# Patient Record
Sex: MALE | Race: WHITE | NOT HISPANIC OR LATINO | Employment: OTHER | URBAN - METROPOLITAN AREA
[De-identification: names, ages, dates, MRNs, and addresses within clinical notes are randomized per-mention and may not be internally consistent; named-entity substitution may affect disease eponyms.]

---

## 2017-01-11 ENCOUNTER — OFFICE VISIT (OUTPATIENT)
Dept: FAMILY MEDICINE | Facility: CLINIC | Age: 72
End: 2017-01-11
Payer: MEDICARE

## 2017-01-11 VITALS
HEIGHT: 74 IN | HEART RATE: 62 BPM | WEIGHT: 232.81 LBS | BODY MASS INDEX: 29.88 KG/M2 | OXYGEN SATURATION: 97 % | RESPIRATION RATE: 16 BRPM | SYSTOLIC BLOOD PRESSURE: 112 MMHG | DIASTOLIC BLOOD PRESSURE: 74 MMHG

## 2017-01-11 DIAGNOSIS — Z00.00 ENCOUNTER FOR PREVENTIVE HEALTH EXAMINATION: ICD-10-CM

## 2017-01-11 DIAGNOSIS — M72.0 DUPUYTREN'S CONTRACTURE OF HAND: Primary | ICD-10-CM

## 2017-01-11 DIAGNOSIS — Z12.11 COLON CANCER SCREENING: ICD-10-CM

## 2017-01-11 DIAGNOSIS — G47.00 INSOMNIA, UNSPECIFIED TYPE: ICD-10-CM

## 2017-01-11 DIAGNOSIS — Z13.6 SCREENING FOR AAA (ABDOMINAL AORTIC ANEURYSM): ICD-10-CM

## 2017-01-11 DIAGNOSIS — Z11.59 NEED FOR HEPATITIS C SCREENING TEST: ICD-10-CM

## 2017-01-11 DIAGNOSIS — Z23 IMMUNIZATION DUE: ICD-10-CM

## 2017-01-11 DIAGNOSIS — G47.33 OSA (OBSTRUCTIVE SLEEP APNEA): ICD-10-CM

## 2017-01-11 PROCEDURE — 90662 IIV NO PRSV INCREASED AG IM: CPT | Mod: S$GLB,,, | Performed by: NURSE PRACTITIONER

## 2017-01-11 PROCEDURE — 99999 PR PBB SHADOW E&M-EST. PATIENT-LVL IV: CPT | Mod: PBBFAC,,, | Performed by: NURSE PRACTITIONER

## 2017-01-11 PROCEDURE — G0008 ADMIN INFLUENZA VIRUS VAC: HCPCS | Mod: S$GLB,,, | Performed by: NURSE PRACTITIONER

## 2017-01-11 PROCEDURE — G0439 PPPS, SUBSEQ VISIT: HCPCS | Mod: S$GLB,,, | Performed by: NURSE PRACTITIONER

## 2017-01-11 RX ORDER — MELOXICAM 15 MG/1
TABLET ORAL
COMMUNITY
Start: 2016-12-05 | End: 2017-01-11

## 2017-01-11 NOTE — MR AVS SNAPSHOT
[facility]  [address] [place] [address]  Phone: [phone]  Fax: [phone]                  Juan Neves   2017 2:00 PM   Office Visit    Description:  Male : 1945   Provider:  Beth Welch NP   Department:  [facility]           Reason for Visit     Health Risk Assessment           Diagnoses this Visit        Comments    Dupuytren's contracture of hand    -  Primary     JOE (obstructive sleep apnea)         Insomnia, unspecified type         Need for hepatitis C screening test         Screening for AAA (abdominal aortic aneurysm)         Colon cancer screening         Immunization due         Encounter for preventive health examination                To Do List           Future Appointments        Provider Department Dept Phone    2017 2:00 PM NASEEM Iqbal MD [facility] [phone]      Goals (5 Years of Data)     None      Follow-Up and Disposition     Return in about 1 month (around 2017) for Follow-up with PCP, HRA visit in 1 year.      [facility] On Call     [facility] On Call Nurse Care Line -  Assistance  Registered nurses in the [facility] On Call Center provide clinical advisement, health education, appointment booking, and other advisory services.  Call for this free service at [phone].             Medications           Message regarding Medications     Verify the changes and/or additions to your medication regime listed below are the same as discussed with your clinician today.  If any of these changes or additions are incorrect, please notify your healthcare provider.        STOP taking these medications     acetaminophen (TYLENOL) 325 MG tablet Take 325 mg by mouth every 6 (six) hours as needed for Pain.    meloxicam (MOBIC) 15 MG tablet            Verify that the below list of medications is an accurate representation of the medications you are currently taking.  If none reported, the list  "may be blank. If incorrect, please contact your healthcare provider. Carry this list with you in case of emergency.           Current Medications     fluticasone (FLONASE) 50 mcg/actuation nasal spray 1 spray by Each Nare route once daily.    RANITIDINE HCL ORAL Take by mouth as needed.           Clinical Reference Information           Vital Signs - Last Recorded  Most recent update: 1/11/2017  2:13 PM by Apoorva Diggs LPN    BP Pulse Resp Ht Wt SpO2    112/74 (BP Location: Right arm, Patient Position: Sitting, BP Method: Manual) 62 16 6' 2" (1.88 m) 105.6 kg (232 lb 12.9 oz) 97%    BMI                29.89 kg/m2          Blood Pressure          Most Recent Value    BP  112/74      Allergies as of 1/11/2017     Pcn [Penicillins]      Immunizations Administered on Date of Encounter - 1/11/2017     Name Date Dose VIS Date Route    Influenza - High Dose  Incomplete 0.5 mL 8/7/2015 Intramuscular      Orders Placed During Today's Visit      Normal Orders This Visit    Ambulatory referral to General Surgery     Influenza - High Dose (65+) (PF) (IM)     Future Labs/Procedures Expected by Expires    Hepatitis C antibody  1/11/2017 3/12/2018    US Abdominal Aorta  1/11/2017 1/11/2018      Instructions      Counseling and Referral of Other Preventative  (Italic type indicates deductible and co-insurance are waived)    Patient Name: Juan Neves  Today's Date: 1/11/2017      SERVICE LIMITATIONS RECOMMENDATION    Vaccines    · Pneumococcal (once after 65)    · Influenza (annually)    · Hepatitis B (if medium/high risk)    · Prevnar 13      Hepatitis B medium/high risk factors:       - End-stage renal disease       - Hemophiliacs who received Factor VII or         IX concentrates       - Clients of institutions for the mentally             retarded       - Persons who live in the same house as          a HepB carrier       - Homosexual men       - Illicit injectable drug abusers     Pneumococcal: Done, no repeat " necessary     Influenza: Scheduled - see appointments     Hepatitis B: N/A Not indicated at this time.      Prevnar 13: Done, repeat at next scheduled date    Prostate cancer screening (annually to age 75)     Prostate specific antigen (PSA) Shared decision making with Provider. Sometimes a co-pay may be required if the patient decides to have this test. The USPSTF no longer recommends prostate cancer screening routinely in medicine: not to follow    Colorectal cancer screening (to age 75)    · Fecal occult blood test (annual)  · Flexible sigmoidoscopy (5y)  · Screening colonoscopy (10y)  · Barium enema   Scheduled, see appointments    Diabetes self-management training (no USPSTF recommendations)  Requires referral by treating physician for patient with diabetes or renal disease. 10 hours of initial DSMT sessions of no less than 30 minutes each in a continuous 12-month period. 2 hours of follow-up DSMT in subsequent years.  N/A Not indicated at this time.     Glaucoma screening (no USPSTF recommendation)  Diabetes mellitus, family history   , age 50 or over    American, age 65 or over  N/A Not indicated at this time.     Medical nutrition therapy for diabetes or renal disease (no recommended schedule)  Requires referral by treating physician for patient with diabetes or renal disease or kidney transplant within the past 3 years.  Can be provided in same year as diabetes self-management training (DSMT), and CMS recommends medical nutrition therapy take place after DSMT. Up to 3 hours for initial year and 2 hours in subsequent years.  N/A Not indicated at this time.     Cardiovascular screening blood tests (every 5 years)  · Fasting lipid panel  Order as a panel if possible  Last done 5/14/2015, recommend to repeat every 1  years    Diabetes screening tests (at least every 3 years, Medicare covers annually or at 6-month intervals for prediabetic patients)  · Fasting blood sugar (FBS) or glucose  tolerance test (GTT)  Patient must be diagnosed with one of the following:       - Hypertension       - Dyslipidemia       - Obesity (BMI 30kg/m2)       - Previous elevated impaired FBS or GTT       ... or any two of the following:       - Overweight (BMI 25 but <30)       - Family history of diabetes       - Age 65 or older       - History of gestational diabetes or birth of baby weighing more than 9 pounds  N/A Not indicated at this time.     Abdominal aortic aneurysm screening (once)  · Sonogram   Limited to patients who meet one of the following criteria:       - Men who are 65-75 years old and have smoked more than 100 cigarette in their lifetime       - Anyone with a family history of abdominal aortic aneurysm       - Anyone recommended for screening by the USPSTF  Scheduled, see appointments    HIV screening (annually for increased risk patients)  · HIV-1 and HIV-2 by EIA, or YOLANDA, rapid antibody test or oral mucosa transudate  Patients must be at increased risk for HIV infection per USPSTF guidelines or pregnant. Tests covered annually for patient at increased risk or as requested by the patient. Pregnant patients may receive up to 3 tests during pregnancy.  Risks discussed, screening is not recommended    Smoking cessation counseling (up to 8 sessions per year)  Patients must be asymptomatic of tobacco-related conditions to receive as a preventative service.  Not indicated at this time.     Subsequent annual wellness visit  At least 12 months since last AWV  Return in one year     The following information is provided to all patients.  This information is to help you find resources for any of the problems found today that may be affecting your health:                Living healthy guide: www.[facility].[place].gov      Understanding Diabetes: www.diabetes.org      Eating healthy: www.cdc.gov/healthyweight      CDC home safety checklist: www.cdc.gov/steadi/patient.html      Agency on Aging:  www.goea.[place].[facility]      Alcoholics anonymous (AA): www.aa.org      Physical Activity: www.nathaly.nih.gov/xm3yxyx      Tobacco use: www.quitwithusla.org          Smoking Cessation     If you would like to quit smoking:   You may be eligible for free services if you are a [place] resident and started smoking cigarettes before September 1, 1988.  Call the Smoking Cessation Trust (SCT) toll free at (626) 515-6090 or (235) 483-6958.   Call 800-QUIT-NOW if you do not meet the above criteria.

## 2017-01-11 NOTE — Clinical Note
Primary Care Providers: Angelo Henley Jr., MD, MD (General)  Your patient was seen today for a HRA visit. Gap(s) in care (HEDIS gaps) have been identified during this visit that require additional testing and possible follow up.  Orders Placed This Encounter     US Abdominal Aorta         Standing Status: Future     Influenza - High Dose (65+) (PF) (IM)     Hepatitis C antibody         Standing Status: Future     Ambulatory referral to General Surgery         Referral Priority:Routine         Referral Type:Surgical (colonoscopy)         These orders were placed using [facility] approved protocol and any results will be forwarded to your office for appropriate follow up. I have included a copy of my visit note; please review the note and feel free to contact me with any questions.   Thank you for allowing me to participate in the care of your patients. Beth Welch NP

## 2017-01-11 NOTE — PATIENT INSTRUCTIONS
Counseling and Referral of Other Preventative  (Italic type indicates deductible and co-insurance are waived)    Patient Name: Juan Neves  Today's Date: 1/11/2017      SERVICE LIMITATIONS RECOMMENDATION    Vaccines    · Pneumococcal (once after 65)    · Influenza (annually)    · Hepatitis B (if medium/high risk)    · Prevnar 13      Hepatitis B medium/high risk factors:       - End-stage renal disease       - Hemophiliacs who received Factor VII or         IX concentrates       - Clients of institutions for the mentally             retarded       - Persons who live in the same house as          a HepB carrier       - Homosexual men       - Illicit injectable drug abusers     Pneumococcal: Done, no repeat necessary     Influenza: Scheduled - see appointments     Hepatitis B: N/A Not indicated at this time.      Prevnar 13: Done, repeat at next scheduled date    Prostate cancer screening (annually to age 75)     Prostate specific antigen (PSA) Shared decision making with Provider. Sometimes a co-pay may be required if the patient decides to have this test. The USPSTF no longer recommends prostate cancer screening routinely in medicine: not to follow    Colorectal cancer screening (to age 75)    · Fecal occult blood test (annual)  · Flexible sigmoidoscopy (5y)  · Screening colonoscopy (10y)  · Barium enema   Scheduled, see appointments    Diabetes self-management training (no USPSTF recommendations)  Requires referral by treating physician for patient with diabetes or renal disease. 10 hours of initial DSMT sessions of no less than 30 minutes each in a continuous 12-month period. 2 hours of follow-up DSMT in subsequent years.  N/A Not indicated at this time.     Glaucoma screening (no USPSTF recommendation)  Diabetes mellitus, family history   , age 50 or over    American, age 65 or over  N/A Not indicated at this time.     Medical nutrition therapy for diabetes or renal disease (no  recommended schedule)  Requires referral by treating physician for patient with diabetes or renal disease or kidney transplant within the past 3 years.  Can be provided in same year as diabetes self-management training (DSMT), and CMS recommends medical nutrition therapy take place after DSMT. Up to 3 hours for initial year and 2 hours in subsequent years.  N/A Not indicated at this time.     Cardiovascular screening blood tests (every 5 years)  · Fasting lipid panel  Order as a panel if possible  Last done 5/14/2015, recommend to repeat every 1  years    Diabetes screening tests (at least every 3 years, Medicare covers annually or at 6-month intervals for prediabetic patients)  · Fasting blood sugar (FBS) or glucose tolerance test (GTT)  Patient must be diagnosed with one of the following:       - Hypertension       - Dyslipidemia       - Obesity (BMI 30kg/m2)       - Previous elevated impaired FBS or GTT       ... or any two of the following:       - Overweight (BMI 25 but <30)       - Family history of diabetes       - Age 65 or older       - History of gestational diabetes or birth of baby weighing more than 9 pounds  N/A Not indicated at this time.     Abdominal aortic aneurysm screening (once)  · Sonogram   Limited to patients who meet one of the following criteria:       - Men who are 65-75 years old and have smoked more than 100 cigarette in their lifetime       - Anyone with a family history of abdominal aortic aneurysm       - Anyone recommended for screening by the USPSTF  Scheduled, see appointments    HIV screening (annually for increased risk patients)  · HIV-1 and HIV-2 by EIA, or YOLANDA, rapid antibody test or oral mucosa transudate  Patients must be at increased risk for HIV infection per USPSTF guidelines or pregnant. Tests covered annually for patient at increased risk or as requested by the patient. Pregnant patients may receive up to 3 tests during pregnancy.  Risks discussed, screening is not  recommended    Smoking cessation counseling (up to 8 sessions per year)  Patients must be asymptomatic of tobacco-related conditions to receive as a preventative service.  Not indicated at this time.     Subsequent annual wellness visit  At least 12 months since last AWV  Return in one year     The following information is provided to all patients.  This information is to help you find resources for any of the problems found today that may be affecting your health:                Living healthy guide: www.[facility].[place].[facility]      Understanding Diabetes: www.diabetes.org      Eating healthy: www.cdc.gov/healthyweight      [facility] home safety checklist: www.cdc.gov/steadi/patient.html      Agency on Aging: www.goea.[place].[facility]      Alcoholics anonymous (AA): www.aa.org      Physical Activity: www.nathaly.nih.gov/et0slfe      Tobacco use: www.quitwithusla.org

## 2017-01-11 NOTE — PROGRESS NOTES
"Juan Neves presented for a  Medicare AWV and comprehensive Health Risk Assessment today. The following components were reviewed and updated:    · Medical history  · Family History  · Social history  · Allergies and Current Medications  · Health Risk Assessment  · Health Maintenance  · Care Team     ** See Completed Assessments for Annual Wellness Visit within the encounter summary.**       The following assessments were completed:  · Living Situation  · CAGE  · Depression Screening  · Timed Get Up and Go  · Whisper Test  · Cognitive Function Screening  · Nutrition Screening  · ADL Screening  · PAQ Screening            Vitals:    01/11/17 1411   BP: 112/74   BP Location: Right arm   Patient Position: Sitting   BP Method: Manual   Pulse: 62   Resp: 16   SpO2: 97%   Weight: 105.6 kg (232 lb 12.9 oz)   Height: 6' 2" (1.88 m)     Body mass index is 29.89 kg/(m^2).  Physical Exam   Constitutional: He is oriented to person, place, and time. He appears well-developed and well-nourished. No distress.   HENT:   Head: Normocephalic and atraumatic.   Eyes: EOM are normal. Pupils are equal, round, and reactive to light.   Neck: Normal range of motion.   Cardiovascular: Normal rate, regular rhythm, normal heart sounds and intact distal pulses.    Pulmonary/Chest: Effort normal and breath sounds normal. No respiratory distress.   Musculoskeletal: Normal range of motion. He exhibits no edema.   Neurological: He is alert and oriented to person, place, and time. Coordination normal.   Skin: Skin is warm and dry.   Psychiatric: He has a normal mood and affect. His behavior is normal. Judgment and thought content normal.   Nursing note and vitals reviewed.        Diagnoses and health risks identified today and associated recommendations/orders:    1. Encounter for preventive health examination    2. Dupuytren's contracture of hand  Chronic; stable. Patient followed by Ortho.     3. JOE (obstructive sleep apnea)  Chronic; stable. " Patient does not use his CPAP machine. Continue current treatment plan as previously prescribed by Sleep Medicine.     4. Insomnia, unspecified type  Chronic; stable. Patient followed by PCP and sleep medicine.     5. Need for hepatitis C screening test  - Hepatitis C antibody; Future    6. Screening for AAA (abdominal aortic aneurysm)  - US Abdominal Aorta; Future    7. Colon cancer screening  - Ambulatory referral to General Surgery    8. Immunization due  Administered today in clinic.   - Influenza - High Dose (65+) (PF) (IM)        Provided Juan with a 5-10 year written screening schedule and personal prevention plan. Recommendations were developed using the USPSTF age appropriate recommendations. Education, counseling, and referrals were provided as needed. After Visit Summary printed and given to patient which includes a list of additional screenings\tests needed.    Return in about 1 month (around 2/11/2017) for Follow-up with PCP, HRA visit in 1 year.    Beth Welch NP

## 2017-01-19 ENCOUNTER — INITIAL CONSULT (OUTPATIENT)
Dept: SURGERY | Facility: CLINIC | Age: 72
End: 2017-01-19
Payer: MEDICARE

## 2017-01-19 VITALS
DIASTOLIC BLOOD PRESSURE: 68 MMHG | BODY MASS INDEX: 29.77 KG/M2 | WEIGHT: 232 LBS | HEIGHT: 74 IN | SYSTOLIC BLOOD PRESSURE: 110 MMHG | HEART RATE: 77 BPM | OXYGEN SATURATION: 97 % | RESPIRATION RATE: 18 BRPM

## 2017-01-19 DIAGNOSIS — Z12.11 SCREENING FOR COLON CANCER: Primary | ICD-10-CM

## 2017-01-19 PROCEDURE — 1126F AMNT PAIN NOTED NONE PRSNT: CPT | Mod: S$GLB,,, | Performed by: EMERGENCY MEDICINE

## 2017-01-19 PROCEDURE — 99203 OFFICE O/P NEW LOW 30 MIN: CPT | Mod: S$GLB,,, | Performed by: EMERGENCY MEDICINE

## 2017-01-19 PROCEDURE — 1157F ADVNC CARE PLAN IN RCRD: CPT | Mod: S$GLB,,, | Performed by: EMERGENCY MEDICINE

## 2017-01-19 PROCEDURE — 1159F MED LIST DOCD IN RCRD: CPT | Mod: S$GLB,,, | Performed by: EMERGENCY MEDICINE

## 2017-01-19 PROCEDURE — 1160F RVW MEDS BY RX/DR IN RCRD: CPT | Mod: S$GLB,,, | Performed by: EMERGENCY MEDICINE

## 2017-01-19 NOTE — LETTER
January 19, 2017      Beth Welch, EHSAN  [address]  [address]  [place] [facility] [address]           [facility]  [address]  [address]  [place] [facility] [facility] [place] [address]  Phone: [phone]  Fax: [phone]          Patient: Juan Neves   MR Number: [id number]   YOB: 1945   Date of Visit: 1/19/2017       Dear Beth Welch:    Thank you for referring Juan Neves to me for evaluation. Attached you will find relevant portions of my assessment and plan of care.    If you have questions, please do not hesitate to call me. I look forward to following Juan Neves along with you.    Sincerely,    NASEEM Iqbal MD    Enclosure  CC:  No Recipients    If you would like to receive this communication electronically, please contact [email or web address] or [phone] to request more information on [employer] access.    For providers and/or their staff who would like to refer a patient to [facility], please contact us through our one-stop-shop provider referral line, [facility], at [phone].    If you feel you have received this communication in error or would no longer like to receive these types of communications, please e-mail [email or web address]

## 2017-01-19 NOTE — PROGRESS NOTES
"History & Physical    SUBJECTIVE:     History of Present Illness:  Patient is a 71 y.o. male presents for screening colonoscopy. Negative family history  Chief Complaint   Patient presents with    Pre-op Exam     Colonoscopy Eval       Review of patient's allergies indicates:   Allergen Reactions    Pcn [penicillins] Other (See Comments)     pt. reports having a "blackout"       Current Outpatient Prescriptions   Medication Sig Dispense Refill    fluticasone (FLONASE) 50 mcg/actuation nasal spray 1 spray by Each Nare route once daily. 3 Bottle 3    RANITIDINE HCL ORAL Take by mouth as needed.       No current facility-administered medications for this visit.        Past Medical History   Diagnosis Date    Allergy     Insomnia 2012    Sleep apnea     Vertigo      Past Surgical History   Procedure Laterality Date    Nasal reconstruction       with bilateral  grafts    Nasal septum surgery      Excision turbinate, submucous      Colonoscopy       Family History   Problem Relation Age of Onset    Acne Daughter     Diabetes Mother     Cancer Father     Diabetes Sister     Cancer Sister     No Known Problems Daughter     Coronary artery disease Neg Hx     Heart disease Neg Hx     Melanoma Neg Hx     Psoriasis Neg Hx     Eczema Neg Hx     Lupus Neg Hx      Social History   Substance Use Topics    Smoking status: Current Some Day Smoker     Types: Cigars    Smokeless tobacco: Never Used      Comment: Pt smokes cigars periodically    Alcohol use Yes      Comment: Daily ( 1 glass of wine and 1 scotch)        Review of Systems:  Review of Systems   Constitutional: Negative for appetite change, chills, diaphoresis, fatigue, fever and unexpected weight change.   HENT: Negative for congestion, dental problem, ear pain, facial swelling, mouth sores, nosebleeds, rhinorrhea, sinus pressure, sore throat, trouble swallowing and voice change.    Eyes: Negative for photophobia, redness and visual " "disturbance.   Respiratory: Negative for cough, choking, chest tightness, shortness of breath and wheezing.    Cardiovascular: Negative for chest pain, palpitations and leg swelling.   Gastrointestinal: Negative for abdominal distention, abdominal pain, anal bleeding, blood in stool, constipation, diarrhea, nausea, rectal pain and vomiting.   Endocrine: Negative for cold intolerance, heat intolerance, polydipsia, polyphagia and polyuria.   Genitourinary: Negative for difficulty urinating, discharge, dysuria, flank pain, hematuria, scrotal swelling and testicular pain.   Musculoskeletal: Negative for back pain, gait problem, joint swelling, neck pain and neck stiffness.   Skin: Negative for rash and wound.   Allergic/Immunologic: Negative for environmental allergies, food allergies and immunocompromised state.   Neurological: Negative for dizziness, seizures, syncope, facial asymmetry, speech difficulty, weakness, light-headedness, numbness and headaches.   Hematological: Negative for adenopathy. Does not bruise/bleed easily.   Psychiatric/Behavioral: Negative for confusion. The patient is not nervous/anxious.        OBJECTIVE:     Vital Signs (Most Recent)  Pulse: 77 (01/19/17 1404)  Resp: 18 (01/19/17 1404)  BP: 110/68 (01/19/17 1404)  SpO2: 97 % (01/19/17 1404)  6' 2" (1.88 m)  105.2 kg (232 lb)     Physical Exam:  Physical Exam   Constitutional: He is oriented to person, place, and time. He appears well-developed and well-nourished. No distress.   HENT:   Head: Normocephalic and atraumatic.   Right Ear: External ear normal.   Left Ear: External ear normal.   Nose: Nose normal.   Mouth/Throat: Oropharynx is clear and moist.   Eyes: Conjunctivae and EOM are normal. Pupils are equal, round, and reactive to light. No scleral icterus.   Neck: No JVD present. No tracheal deviation present. No thyromegaly present.   Cardiovascular: Normal rate, regular rhythm, normal heart sounds and intact distal pulses.  Exam reveals " no gallop and no friction rub.    No murmur heard.  Pulmonary/Chest: Effort normal and breath sounds normal. No stridor. No respiratory distress. He has no wheezes. He has no rales. He exhibits no tenderness.   Abdominal: Soft. Bowel sounds are normal. He exhibits no distension and no mass. There is no tenderness. There is no rebound and no guarding. No hernia.   Genitourinary: Rectum normal, prostate normal and penis normal.   Musculoskeletal: He exhibits no edema or tenderness.   Lymphadenopathy:     He has no cervical adenopathy.   Neurological: He is alert and oriented to person, place, and time. He has normal reflexes. He displays normal reflexes.   Skin: Skin is warm and dry. No rash noted. He is not diaphoretic. No erythema. No pallor.   Psychiatric: He has a normal mood and affect.         ASSESSMENT/PLAN:     Screening colonoscopy    PLAN:Plan     Colonoscopy on 1.23.2017

## 2017-01-19 NOTE — MR AVS SNAPSHOT
[facility] Surgery  [address]  [address]  [facility] [address]  Phone: [phone]  Fax: [phone]                  Juan Neves   2017 2:00 PM   Initial consult    Description:  Male : 1945   Provider:  NASEEM Iqbal MD   Department:  [facility] Surgery           Reason for Visit     Pre-op Exam           Diagnoses this Visit        Comments    Screening for colon cancer    -  Primary            To Do List           Your Future Surgeries/Procedures     2017   Surgery with NASEEM Iqbal MD   [facility] (--)    [address] [id number] [phone]              Goals (5 Years of Data)     None      Follow-Up and Disposition     Return for colonoscvopy 17.    Follow-up and Disposition History      [facility]     [facility] -  Assistance  Registered nurses in the [facility] provide clinical advisement, health education, appointment booking, and other advisory services.  Call for this free service at [phone].             Medications           Message regarding Medications     Verify the changes and/or additions to your medication regime listed below are the same as discussed with your clinician today.  If any of these changes or additions are incorrect, please notify your healthcare provider.             Verify that the below list of medications is an accurate representation of the medications you are currently taking.  If none reported, the list may be blank. If incorrect, please contact your healthcare provider. Carry this list with you in case of emergency.           Current Medications     fluticasone (FLONASE) 50 mcg/actuation nasal spray 1 spray by Each Nare route once daily.    RANITIDINE HCL ORAL Take by mouth as needed.           Clinical Reference Information           Vital Signs - Last Recorded  Most recent update: 2017  2:06 PM by Minerva  "BREANNA Combs    BP Pulse Resp Ht Wt SpO2    110/68 (BP Location: Right arm, Patient Position: Sitting, BP Method: Manual) 77 18 6' 2" (1.88 m) 105.2 kg (232 lb) 97%    BMI                29.79 kg/m2          Blood Pressure          Most Recent Value    BP  110/68      Allergies as of 1/19/2017     Pcn [Penicillins]      Immunizations Administered on Date of Encounter - 1/19/2017     None      Orders Placed During Today's Visit      Normal Orders This Visit    Case request GI: COLONOSCOPY     Future Labs/Procedures Expected by Expires    Comprehensive metabolic panel  1/19/2017 3/20/2018    EKG 12-lead  1/19/2017 1/22/2018      Instructions    1. Light breakfast day prior to procedure  2. Clear liquids remainder of day after breakfast*  3. Go Lytely 3 liters by mouth starting in afternoon  4. Nothing to eat or drink after midnight  5. Present to Same Day Surgery for 6:30 - 7:00 AM the day of the procedure  6. Have someone available to drive you home after the procedure            * CLEAR LIQUIDS: water, tea, coffee, juices, carbonated beverages; Gatorade, broth, soups that do not have solids, smoothies, milk shakes, etc.       Smoking Cessation     If you would like to quit smoking:   You may be eligible for free services if you are a [place] resident and started smoking cigarettes before September 1, 1988.  Call the Smoking Cessation Trust (SCT) toll free at (260) 667-7364 or (426) 681-7606.   Call 0-800-QUIT-NOW if you do not meet the above criteria.            "

## 2017-01-19 NOTE — PATIENT INSTRUCTIONS
1. Light breakfast day prior to procedure  2. Clear liquids remainder of day after breakfast*  3. Go Lytely 3 liters by mouth starting in afternoon  4. Nothing to eat or drink after midnight  5. Present to Same Day Surgery for 6:30 - 7:00 AM the day of the procedure  6. Have someone available to drive you home after the procedure            * CLEAR LIQUIDS: water, tea, coffee, juices, carbonated beverages; Gatorade, broth, soups that do not have solids, smoothies, milk shakes, etc.

## 2017-01-23 PROBLEM — Z12.11 SCREENING FOR COLON CANCER: Status: RESOLVED | Noted: 2017-01-23 | Resolved: 2017-01-23

## 2017-01-23 PROBLEM — Z12.11 SCREENING FOR COLON CANCER: Status: ACTIVE | Noted: 2017-01-23

## 2017-07-12 ENCOUNTER — OFFICE VISIT (OUTPATIENT)
Dept: OPTOMETRY | Facility: CLINIC | Age: 72
End: 2017-07-12
Payer: MEDICARE

## 2017-07-12 DIAGNOSIS — Z46.0 FITTING AND ADJUSTMENT OF SPECTACLES AND CONTACT LENSES: ICD-10-CM

## 2017-07-12 DIAGNOSIS — H25.13 NUCLEAR SCLEROSIS, BILATERAL: Primary | ICD-10-CM

## 2017-07-12 DIAGNOSIS — H52.03 HYPEROPIA WITH ASTIGMATISM AND PRESBYOPIA, BILATERAL: ICD-10-CM

## 2017-07-12 DIAGNOSIS — H52.203 HYPEROPIA WITH ASTIGMATISM AND PRESBYOPIA, BILATERAL: ICD-10-CM

## 2017-07-12 DIAGNOSIS — H52.4 HYPEROPIA WITH ASTIGMATISM AND PRESBYOPIA, BILATERAL: ICD-10-CM

## 2017-07-12 PROCEDURE — 92015 DETERMINE REFRACTIVE STATE: CPT | Mod: S$GLB,,, | Performed by: OPTOMETRIST

## 2017-07-12 PROCEDURE — 92014 COMPRE OPH EXAM EST PT 1/>: CPT | Mod: S$GLB,,, | Performed by: OPTOMETRIST

## 2017-07-12 PROCEDURE — 99999 PR PBB SHADOW E&M-EST. PATIENT-LVL II: CPT | Mod: PBBFAC,,, | Performed by: OPTOMETRIST

## 2017-07-13 NOTE — PROGRESS NOTES
KARISSA LAZO 08/2016. Wearing contacts would like vision to be better. Bothered by   glare at night. Patient states he is wearing multifocal lenses in each   eye, but final RX in chart is different.    Last edited by Brunilda Patel on 7/12/2017  3:07 PM. (History)            Assessment /Plan     For exam results, see Encounter Report.    Nuclear sclerosis, bilateral    Hyperopia with astigmatism and presbyopia, bilateral    Fitting and adjustment of spectacles and contact lenses      1. Educated pt on presence of cataracts and effects on vision. No surgery at this time. Recheck in one year.  2. Spec Rx given and Contact lens Rx released to pt. Daily wear only advised, with education to risks of extended wear.  Discussed lens care, compliance and solutions. RTC yearly contact lens follow up.   . Different lens options discussed with patient. RTC 1 year full exam.             8/1/17 addendum--pt called to reports he wants to go back to [facility].  Rewrote Rx in CL section to reflect patients preference

## 2017-07-17 ENCOUNTER — PATIENT MESSAGE (OUTPATIENT)
Dept: OPTOMETRY | Facility: CLINIC | Age: 72
End: 2017-07-17

## 2017-07-24 ENCOUNTER — PATIENT MESSAGE (OUTPATIENT)
Dept: OPTOMETRY | Facility: CLINIC | Age: 72
End: 2017-07-24

## 2017-07-29 ENCOUNTER — PATIENT MESSAGE (OUTPATIENT)
Dept: OPTOMETRY | Facility: CLINIC | Age: 72
End: 2017-07-29

## 2017-08-22 ENCOUNTER — PATIENT MESSAGE (OUTPATIENT)
Dept: FAMILY MEDICINE | Facility: CLINIC | Age: 72
End: 2017-08-22

## 2017-08-22 DIAGNOSIS — M25.569 ACUTE KNEE PAIN, UNSPECIFIED LATERALITY: Primary | ICD-10-CM

## 2018-01-31 ENCOUNTER — PES CALL (OUTPATIENT)
Dept: ADMINISTRATIVE | Facility: CLINIC | Age: 73
End: 2018-01-31

## 2018-02-28 ENCOUNTER — PATIENT MESSAGE (OUTPATIENT)
Dept: FAMILY MEDICINE | Facility: CLINIC | Age: 73
End: 2018-02-28

## 2018-02-28 ENCOUNTER — PES CALL (OUTPATIENT)
Dept: ADMINISTRATIVE | Facility: CLINIC | Age: 73
End: 2018-02-28

## 2018-03-01 ENCOUNTER — OFFICE VISIT (OUTPATIENT)
Dept: FAMILY MEDICINE | Facility: CLINIC | Age: 73
End: 2018-03-01
Payer: MEDICARE

## 2018-03-01 VITALS
HEART RATE: 66 BPM | WEIGHT: 229 LBS | SYSTOLIC BLOOD PRESSURE: 116 MMHG | HEIGHT: 74 IN | DIASTOLIC BLOOD PRESSURE: 80 MMHG | BODY MASS INDEX: 29.39 KG/M2 | OXYGEN SATURATION: 97 %

## 2018-03-01 DIAGNOSIS — M21.612 BILATERAL BUNIONS: ICD-10-CM

## 2018-03-01 DIAGNOSIS — Z23 IMMUNIZATION DUE: ICD-10-CM

## 2018-03-01 DIAGNOSIS — G47.33 OSA (OBSTRUCTIVE SLEEP APNEA): ICD-10-CM

## 2018-03-01 DIAGNOSIS — M72.0 DUPUYTREN'S CONTRACTURE OF HAND: ICD-10-CM

## 2018-03-01 DIAGNOSIS — G47.00 INSOMNIA, UNSPECIFIED TYPE: ICD-10-CM

## 2018-03-01 DIAGNOSIS — M21.611 BILATERAL BUNIONS: ICD-10-CM

## 2018-03-01 DIAGNOSIS — Z00.00 ENCOUNTER FOR PREVENTIVE HEALTH EXAMINATION: Primary | ICD-10-CM

## 2018-03-01 PROBLEM — M21.619 BUNION: Status: ACTIVE | Noted: 2018-03-01

## 2018-03-01 PROCEDURE — 99999 PR PBB SHADOW E&M-EST. PATIENT-LVL V: CPT | Mod: PBBFAC,,, | Performed by: NURSE PRACTITIONER

## 2018-03-01 PROCEDURE — G0439 PPPS, SUBSEQ VISIT: HCPCS | Mod: S$GLB,,, | Performed by: NURSE PRACTITIONER

## 2018-03-01 RX ORDER — OMEPRAZOLE 20 MG/1
20 CAPSULE, DELAYED RELEASE ORAL DAILY
COMMUNITY
End: 2018-03-14

## 2018-03-01 NOTE — PATIENT INSTRUCTIONS
Counseling and Referral of Other Preventative  (Italic type indicates deductible and co-insurance are waived)    Patient Name: Juan Neves  Today's Date: 3/1/2018    Health Maintenance       Date Due Completion Date    Influenza Vaccine 08/01/2017 1/11/2017    Lipid Panel 05/14/2020 5/14/2015    TETANUS VACCINE 08/04/2026 8/4/2016    Colonoscopy 01/23/2027 1/23/2017        No orders of the defined types were placed in this encounter.    The following information is provided to all patients.  This information is to help you find resources for any of the problems found today that may be affecting your health:                Living healthy guide: www.[facility].[place].[facility]      Understanding Diabetes: www.diabetes.org      Eating healthy: www.cdc.gov/healthyweight      [facility] home safety checklist: www.cdc.gov/steadi/patient.html      Agency on Aging: www.goea.[place]      Alcoholics anonymous (AA): www.aa.org      Physical Activity: www.nathaly.nih.gov/ai2lrwy      Tobacco use: www.quitwithusla.org

## 2018-03-01 NOTE — Clinical Note
Primary Care Providers: Angelo Henley Jr., MD, MD (General)  Your patient was seen today for a HRA visit. Gap(s) in care (HEDIS gaps) have been identified during this visit that require additional testing and possible follow up.  Orders Placed This Encounter     Ambulatory referral to Podiatry         Referral Priority:Routine         Referral Type:Consultation         Referral Reason:Specialty Services Required     Ambulatory referral to Orthopedics         Referral Priority:Routine         Referral Type:Consultation         Referred to Provider:Geovanni Trammell Jr., MD  These orders were placed using [facility] approved protocol and any results will be forwarded to your office for appropriate follow up. I have included a copy of my visit note; please review the note and feel free to contact me with any questions.   Thank you for allowing me to participate in the care of your patients. Beth Welch NP

## 2018-03-01 NOTE — PROGRESS NOTES
"Juan Nvees presented for a follow-up Medicare AWV today. The following components were reviewed and updated:    · Medical history  · Family History  · Social history  · Allergies and Current Medications  · Health Risk Assessment  · Health Maintenance  · Care Team    **See Completed Assessments for Annual Wellness visit with in the encounter summary    The following assessments were completed:  · Depression Screening  · Cognitive function Screening  · Timed Get Up Test  · Whisper Test    Vitals:    03/01/18 1504   BP: 116/80   Pulse: 66   SpO2: 97%   Weight: 103.9 kg (229 lb)   Height: 6' 2" (1.88 m)     Body mass index is 29.4 kg/m².   ]        Diagnoses and health risks identified today and associated recommendations/orders:  1. Encounter for preventive health examination    2. JOE (obstructive sleep apnea)  Chronic; stable. Patient does not use his cpap machine at bedtime. Patient followed by Sleep Medicine (Dr. Emerson).      3. Insomnia, unspecified type  Chronic; stable. Continue current treatment plan as previously prescribed by Sleep Medicine.    4. Dupuytren's contracture of hand  Chronic; stable. Patient was followed by Orthopedic Hand Surgery at [facility]. Patient requested to be referred to a new hand specialist   - Ambulatory referral to Orthopedics (Dr. Trammell)    5. Bilateral bunions  Patient c/o bilateral bunion pain and would like to be evaluated Podiatry.  - Ambulatory referral to Podiatry (Julito)    6. Immunization due  Patient declined immunization at this time. Patient aware of benefits of being immunized and risks of not getting immunization.   - Influenza vaccine      Provided Juan with a 5-10 year written screening schedule and personal prevention plan. Recommendations were developed using the USPSTF age appropriate recommendations. Education, counseling, and referrals were provided as needed.  After Visit Summary printed and given to patient which " includes a list of additional screenings\tests needed.    Follow-up for HRA visit in 1 year.      Beth Welch NP  I offered to discuss end of life issues, including information on how to make advance directives that the patient could use to name someone who would make medical decisions on their behalf if they became too ill to make themselves.    ___Patient declined  _X_Patient is interested, I provided paper work and offered to discuss.

## 2018-03-02 ENCOUNTER — TELEPHONE (OUTPATIENT)
Dept: ORTHOPEDICS | Facility: CLINIC | Age: 73
End: 2018-03-02

## 2018-03-02 NOTE — TELEPHONE ENCOUNTER
----- Message from Elsi Duong sent at 3/2/2018  9:10 AM CST -----  Contact: [phone] self  Patient has an appt scheduled for 03/26 but would like to be seen sooner because of the pain in his hand. Please call and advise.

## 2018-03-13 ENCOUNTER — OFFICE VISIT (OUTPATIENT)
Dept: PODIATRY | Facility: CLINIC | Age: 73
End: 2018-03-13
Payer: MEDICARE

## 2018-03-13 VITALS
HEART RATE: 60 BPM | HEIGHT: 74 IN | RESPIRATION RATE: 18 BRPM | WEIGHT: 229 LBS | DIASTOLIC BLOOD PRESSURE: 78 MMHG | SYSTOLIC BLOOD PRESSURE: 144 MMHG | BODY MASS INDEX: 29.39 KG/M2

## 2018-03-13 DIAGNOSIS — M21.612 BILATERAL BUNIONS: ICD-10-CM

## 2018-03-13 DIAGNOSIS — M79.671 BILATERAL FOOT PAIN: Primary | ICD-10-CM

## 2018-03-13 DIAGNOSIS — M21.611 BILATERAL BUNIONS: ICD-10-CM

## 2018-03-13 DIAGNOSIS — M79.672 BILATERAL FOOT PAIN: Primary | ICD-10-CM

## 2018-03-13 PROCEDURE — 99203 OFFICE O/P NEW LOW 30 MIN: CPT | Mod: S$GLB,,, | Performed by: PODIATRIST

## 2018-03-13 RX ORDER — MELOXICAM 15 MG/1
15 TABLET ORAL DAILY
Qty: 30 TABLET | Refills: 0 | Status: SHIPPED | OUTPATIENT
Start: 2018-03-13 | End: 2018-09-19

## 2018-03-13 NOTE — PROGRESS NOTES
Subjective:    Patient ID: Juan eNves is a 73 y.o. male.    Chief Complaint: Bunions      HPI:   Juan is a 73 y.o. male who presents to the podiatry clinic  with complaint of  bilateral foot pain. Onset of the symptoms was several months ago. Precipitating event: none known. Current symptoms include: ability to bear weight, but with some pain and worsening symptoms after a period of activity. Aggravating factors: any weight bearing. Symptoms have gradually worsened. Patient has had no prior foot problems. Evaluation to date: none. Treatment to date: none. Patients rates pain 7/10 on pain scale.        HPI    Last Podiatry Enc: Visit date not found  Last Enc w/ Me: Visit date not found    Past Medical History:   Diagnosis Date    Allergy     Insomnia 2012    Sleep apnea      Past Surgical History:   Procedure Laterality Date    COLONOSCOPY      COLONOSCOPY N/A 1/23/2017    Procedure: COLONOSCOPY;  Surgeon: NASEEM Iqbal MD;  [facility];  Service: Endoscopy;  Laterality: N/A;    EXCISION TURBINATE, SUBMUCOUS      NASAL RECONSTRUCTION      with bilateral  grafts    NASAL SEPTUM SURGERY       Social History     Social History    Marital status:      Spouse name: N/A    Number of children: N/A    Years of education: N/A     Occupational History    Retired [employer]     Social History Main Topics    Smoking status: Current Some Day Smoker     Types: Cigars    Smokeless tobacco: Never Used      Comment: Pt smokes cigars periodically (6x's a year)    Alcohol use Yes      Comment:  ( 1 glass of wine and 1 scotch)    Drug use: No    Sexual activity: Yes     Partners: Female     Other Topics Concern    Not on file     Social History Narrative    He is employed in the oil field. Lives with his wife in [place]. He is from [place]. Non-smoker. He drinks a lot of wine. He has 2 adult daughters. He enjoys gardening and golf. He was in the [employer]. He is planning to travel to  [place].                          Current Outpatient Prescriptions:     fluticasone (FLONASE) 50 mcg/actuation nasal spray, 1 spray by Each Nare route once daily., Disp: 3 Bottle, Rfl: 3    omeprazole (PRILOSEC) 20 MG capsule, Take 20 mg by mouth once daily., Disp: , Rfl:     meloxicam (MOBIC) 15 MG tablet, Take 1 tablet (15 mg total) by mouth once daily., Disp: 30 tablet, Rfl: 0   Review of patient's allergies indicates:   Allergen Reactions    Pcn [penicillins] Other (See Comments)     pt. reports having a "blackout"       ROS  ROS Constitutional:  General Appearance: well nourished  Vascular: negative for cramps, edema and bruising  Musculoskeletal: positive for joint pain  Skin: negative for rashes and lesions  Neurological: negative for burning, tingling and numbness  Gastrointestinal: negative for stomach pain, nausea and vomiting        Objective:        BP (!) 144/78 (BP Location: Left arm, Patient Position: Sitting, BP Method: Large (Automatic))   Pulse 60   Resp 18   Ht 6' 2" (1.88 m)   Wt 103.9 kg (229 lb)   BMI 29.40 kg/m²     Ortho/SPM Exam  Physical Exam  Physical Exam  Physical Exam  Neurologic Exam       LE exam con't:  V: DP 2/4, PT 2/4, CRT< 3s to all digits tested.     N: SILT in SP/DP/T/Brad/Saph distributions.    Ortho:  +Motor EHL/FHL/TA/GA   +TTP w/ limited ROM at b/l 1st DIPJs and +TTP b/l 1st MTPJ   Compartments soft/compressible. No pain on passive stretch of big toe. No calf  Pain.   observed lateral deviation of the hallux with bunion deformity present b/l    Derm: Skin intact. Pinch callus medial b/l 1st DIPJ      Assessment:     Imaging / Labs:    No results found.          1. Bilateral foot pain    2. Bilateral bunions          Plan:       Orders Placed This Encounter    X-Ray Foot Complete Bilateral    meloxicam (MOBIC) 15 MG tablet     Austin Martinez was seen today for bunions.    Diagnoses and all orders for this visit:    Bilateral foot pain  -     meloxicam " (MOBIC) 15 MG tablet; Take 1 tablet (15 mg total) by mouth once daily.  -     X-Ray Foot Complete Bilateral; Future    Bilateral bunions  -     meloxicam (MOBIC) 15 MG tablet; Take 1 tablet (15 mg total) by mouth once daily.  -     X-Ray Foot Complete Bilateral; Future        Juan Neves is a 73 y.o. male presenting w/   1. Bilateral foot pain    2. Bilateral bunions        -pt seen, evaluated, and managed  -dx discussed in detail. All questions/concerns addressed  -all tx options discussed. All alternatives, risks, benefits of all txs discussed  -the patient was educated about the diagnosis  -We discussed conservative care options possible including but not limited to shoe wear and/or padding, bracing/strapping, at home ROM, formal PT, medical therapy, injection therapy  -rxs dispensed: mobic  -xr/imaging on way out--> will review at nxt visit  -labs reviewed by me: ok for nsaids use  -implemented icing/stretching regimen  -offloading pads dispensed  -rec'd shoegear changes and OTC orthotics    Follow-up in about 4 weeks (around 4/10/2018).

## 2018-03-13 NOTE — LETTER
March 18, 2018      Beth Welch, EHSAN  [address]  [id number]  [facility] [address]           [facility]  [address], [address]   [facility] [address]  Phone: [phone]  Fax: [phone]          Patient: Juan Neves   MR Number: [id number]   YOB: 1945   Date of Visit: 3/13/2018       Dear Beth Welch:    Thank you for referring Juan Neves to me for evaluation. Attached you will find relevant portions of my assessment and plan of care.    If you have questions, please do not hesitate to call me. I look forward to following Juan Neves along with you.    Sincerely,    Heri Vila Jr., DPM    Enclosure  CC:  No Recipients    If you would like to receive this communication electronically, please contact [email or web address] or [phone] to request more information on [employer] access.    For providers and/or their staff who would like to refer a patient to [facility], please contact us through our one-stop-shop provider referral line, [facility], at [phone].    If you feel you have received this communication in error or would no longer like to receive these types of communications, please e-mail [email or web address]

## 2018-03-13 NOTE — PATIENT INSTRUCTIONS
Bunions  Even though bunions are a common foot deformity, there are misconceptions about them. Many people may unnecessarily suffer the pain of bunions for years before seeking treatment.    What Is a Bunion?          A bunion (also referred to as hallux valgus) is often described as a bump on the side of the big toe. But a bunion is more than that. The visible bump actually reflects changes in the bony framework of the front part of the foot. The big toe leans toward the second toe, rather than pointing straight ahead. This throws the bones out of alignment--producing the bunions bump.        Bunions are a progressive disorder. They begin with a leaning of the big toe, gradually changing the angle of the bones over the years and slowly producing the characteristic bump, which becomes increasingly prominent. Symptoms usually appear at later stages, although some people never have symptoms.    Causes  Bunions are most often caused by an inherited faulty mechanical structure of the foot. It is not the bunion itself that is inherited but certain foot types that make a person prone to developing a bunion.    Although wearing shoes that crowd the toes will not actually cause bunions, it sometimes makes the deformity get progressively worse. Symptoms may therefore appear sooner.    Symptoms  Symptoms, which occur at the site of the bunion, may include:    Pain or soreness  Inflammation and redness  A burning sensation  Possible numbness    Symptoms occur most often when wearing shoes that crowd the toes, such as shoes with a tight toe box or high heels. This may explain why women are more likely to have symptoms than men. In addition, spending long periods of time on your feet can aggravate the symptoms of bunions.    Diagnosis  Diagram indicating location of bunion on a footBunions are readily apparent--the prominence is visible at the base of the big toe or side of the foot. However, to fully evaluate the condition,  the foot and ankle surgeon may take x-rays to determine the degree of the deformity and assess the changes that have occurred.    Because bunions are progressive, they do not go away and will usually get worse over time. But not all cases are alike--some bunions progress more rapidly than others. Once your surgeon has evaluated your bunion, a treatment plan can be developed that is suited to your needs.    Nonsurgical Treatment  Sometimes observation of the bunion is all that is needed. To reduce the chance of damage to the joint, periodic evaluation and x-rays by your surgeon are advised.    In many other cases, however, some type of treatment is needed. Early treatments are aimed at easing the pain of bunions, but they will not reverse the deformity itself. These include:    Changes in shoewear. Wearing the right kind of shoes is very important. Choose shoes that have a wide toe box and forgo those with pointed toes or high heels, which may aggravate the condition.  Padding. Pads placed over the area of the bunion can help minimize pain. These can be obtained from your surgeon or purchased at a drug store.  Activity modifications. Avoid activity that causes bunion pain, including standing for long periods of time.  Medications. Oral nonsteroidal anti-inflammatory drugs (NSAIDs), such as ibuprofen, may be recommended to reduce pain and inflammation.  Icing. Applying an ice pack several times a day helps reduce inflammation and pain.  Injection therapy. Although rarely used in bunion treatment, injections of corticosteroids may be useful in treating the inflamed bursa (fluid-filled sac located around a joint) sometimes seen with bunions.  Orthotic devices. In some cases, custom orthotic devices may be provided by the foot and ankle surgeon.      Recommended OTC orthotics:  -powerstep  -superfeet    Recommended shoegear:  -new balance  -ascics  -mizuno  -palmer       When Is Surgery Needed?  If nonsurgical treatments  fail to relieve bunion pain and when the pain of a bunion interferes with daily activities, it is time to discuss surgical options with a foot and ankle surgeon. Together you can decide if surgery is best for you.    A variety of surgical procedures is available to treat bunions. The procedures are designed to remove the bump of bone, correct the changes in the bony structure of the foot and correct soft tissue changes that may also have occurred. The goal of surgery is the reduction of pain and deformity.    In selecting the procedure or combination of procedures for your particular case, the foot and ankle surgeon will take into consideration the extent of your deformity based on the x-ray findings, your age, your activity level and other factors. The length of the recovery period will vary, depending on the procedure or procedures performed.                                          Bunion    You have a bunion. This is a bony bump at the base of your big toe, along the inside edge of your foot. As the bump gets bigger, it can become red, swollen, and painful with shoe wear.  Bunions may occur if you wear shoes that are too tight and pinch your toes together. High heels may make this worse. In some cases a bunion is due to poor alignment of the foot and ankle. This puts extra weight on the instep of each foot.  Once a bunion forms, it changes the way weight is spread all across your foot. This causes the bunion to get worse over time. The big toe will bend more and more toward the other toes.  A minor bunion can be treated by:  · Wearing properly fitting shoes  · Using bunion pads  · Wearing shoe inserts, called orthotics, to better align the foot and ankle  Physical therapy with ultrasound or whirlpool baths can ease pain, redness, and swelling. Severe cases may require surgery. If you dont treat what is causing the bunion, it may get larger and more painful.  Home care  · Limit high heels. These shoes force your  foot forward, crowding the toes together.  · Switch to comfortable shoes with a wide toe area. Or have your existing shoes stretched by a shoe repair shop.  · Avoid shoes that are tight, narrow, or pointed.  · If you are flat-footed, using arch supports may help prevent further deformity. The best shoe inserts are the ones custom made by a foot specialist, called a podiatrist, or other healthcare provider.  · Put a bunion pad over the bunion to ease pressure of your shoe against the bunion. You can buy these pads at most pharmacies without a prescription  · To reduce pain and swelling, apply an ice pack over the injured area for 15 to 20 minutes. Do this every 1 to 2 hours the first day. Keep using ice 3 to 4 times a day until the pain and swelling goes away.  · To make an ice pack, put ice cubes in a sealed zip-lock plastic bag. Wrap the bag in a clean, thin towel or cloth. Never put ice or an ice pack directly on the skin.  · You may use over-the-counter pain medicine to control pain, unless another medicine was prescribed. Talk with your provider before using these medicines if you have chronic liver or kidney disease, or ever had a stomach ulcer or GI (gastrointestinal) bleeding.  ·   Follow-up care  Follow up with a podiatrist or foot doctor, or as advised.  If X-rays were taken, you will be notified of any new findings that may affect your care.  When to seek medical care  Contact your healthcare provider if any of the following occur:  · Increasing pain or redness around the base of the big toe  · Painful ingrown toenail, with redness and swelling or pus around the nail  Date Last Reviewed: 11/21/2015  © 6547-6044 [employer]. [address] [place] [address], [place] [address]. All rights reserved. This information is not intended as a substitute for professional medical care. Always follow your healthcare professional's instructions.        Treating Bunions  Although a bunion wont go away, wearing  "shoes that fit properly will often relieve the pain. Padding and icing the bunion may also help. Bunions that remain painful may need surgery.     Heels: Heel height should be low. The back of the shoe should  your heel firmly so the shoe doesn't flop when you walk.         Toes: There should be 1/2" between your longest toe and the tip of the shoe. The shoe should be wide enough for you to wiggle your toes.    Shoes  To relieve a bunion, you dont have to buy shoes that are ugly or out of fashion. But follow these tips:  · Shop for shoes late in the day. This is when your feet are the largest.  · Have both feet measured often. Fit shoes to your larger foot.  · Look for shoes that have the same shape as your foot but are slightly wider across the toes.  · Choose low-heeled shoes.  · Always try shoes on. Stand up and walk around. If the shoes arent comfortable, dont buy them.  Ice massage  To help relieve a painful bunion, put an ice cube in a plastic bag. Rub the ice on the bunion for 5 minutes. Repeat 2 to 3 times a day.  Pads  You may want to put a pad over the bunion to cushion it. You can buy bunion pads at most [facility].  Surgery  Wearing wider shoes and padding the bunion may not relieve the pain. Your healthcare provider may then suggest surgery. During surgery, the bunion is shaved away and the bones are put back in a straight line.   Date Last Reviewed: 9/27/2015  © 2601-3687 [employer]. [address] [place] [address], [place] [address]. All rights reserved. This information is not intended as a substitute for professional medical care. Always follow your healthcare professional's instructions.      Osteotomy and Ligament or Tendon Repair (Bunion Surgery)  Osteotomy and ligament or tendon repair is a type of bunion surgery. A bunion is a bony bump (growth) at the base of your big toe. This growth can form when your big toe pushes against your next toe. A bunion can cause pain, swelling, " redness, and other symptoms. During this surgery, bone is removed from your toe. Nearby tendons and ligaments are made shorter or longer as needed. This allows your big toe to line up (align) properly.    Preparing for surgery  Follow any instructions from your healthcare provider.  Tell your surgeon about any medicines you are taking. You may need to stop taking all or some of these before the procedure. This includes:  · All prescription medicines  · Over-the-counter medicines such as aspirin or ibuprofen  · Street drugs  · Herbs, vitamins, and other supplements  Also, follow any directions youre given for not eating or drinking before surgery.  The day of surgery  The surgery takes at least 60 minutes. You will likely go home the same day.  Before the surgery begins  · An IV (intravenous) line is put into a vein in your arm or hand. This line gives you fluids and medicines.  · You may be given medicine to help you relax (sedation). To keep you free of pain during the surgery, you may have medicine to block the nerves in your foot. Or, you will be given general anesthesia. This puts you into a deep sleep.  During the surgery  · A cut (incision) is made on your foot to expose the bunion bump, and the tendons and ligaments around it. The tendons and ligaments that are tight are cut (released).  · The bunion bump is removed with a bone saw. Your big toe bone or the main bone in your foot is shortened and realigned. A pin, screw, or plate is used to hold your toe and foot bones together.  · The nearby tendons and ligaments may be tightened. If there is extra tissue, it is removed and the ends are stitched (sutured) together. The incision in your skin is then closed with sutures. Your foot is bandaged.  After the surgery  Youll be taken to a recovery room. You may have medicines to manage pain. You may wear a brace, surgical shoe, or cast to protect your foot while it heals. The surgeon will tell you when you can go  home. Have an adult family member or friend drive you.  Recovering at home  Once home, follow any instructions you are given. During your recovery:  · Take pain medicine exactly as directed.  · To prevent swelling, sit or lie with your leg raised on one or more pillows. Do this for the first 2 days.  · Follow your surgeon's instructions about putting weight on your foot after the surgery. You may need to use a walker, cane, or crutches for a time.  · You may wear a brace, surgical shoe, or cast for up to a month or longer. Care for this as instructed. Keep it dry by wrapping it in plastic bags when bathing.  · Avoid sports and other activities until your surgeon says its OK.  · Care for your incision as instructed.  · Don't drive until your surgeon says its OK.  Call your surgeon if you have any of the following:  · Chest pain or trouble breathing  · Fever of 100.4°F (38°C) or higher, or as directed by your surgeon  · Pain that isnt helped by medicine or rest  · Increased swelling not helped by raising or icing your foot  · Signs of infection at any incision site, such as increased redness or swelling, warmth, more pain, or bad-smelling drainage  · Bleeding through the bandages  · Symptoms of poor circulation, such as toes that look blue instead of pinkish  · Numbness that doesnt go away  · Any other signs or symptoms indicated by your surgeon   Follow-up  Keep all follow-up appointments with your surgeon. These are to check that you are healing well from the surgery. You may have X-rays to check how the bone is healing. Physical therapy, foot exercises, and other treatments may be discussed at follow-up visits. Full recovery can take at least a few months.  Risks and possible complications include:  · Infection  · Bleeding  · Sensitivity at the incision site for months after the surgery  · Foot pain that doesnt go away after surgery  · Numbness in the foot  · Only partial relief of symptoms, or no relief of  symptoms  · Return of the bunion  · Risks of anesthesia (the anesthesiologist will discuss these with you)  · Poor wound healing  · Breakage of screws or pins  · A lot of scarring   Date Last Reviewed: 7/28/2015  © 1554-6237 [employer]. [address] [place] [address], [place] [address]. All rights reserved. This information is not intended as a substitute for professional medical care. Always follow your healthcare professional's instructions.

## 2018-03-14 ENCOUNTER — OFFICE VISIT (OUTPATIENT)
Dept: FAMILY MEDICINE | Facility: CLINIC | Age: 73
End: 2018-03-14
Payer: MEDICARE

## 2018-03-14 VITALS
WEIGHT: 232.56 LBS | SYSTOLIC BLOOD PRESSURE: 124 MMHG | OXYGEN SATURATION: 96 % | HEIGHT: 74 IN | BODY MASS INDEX: 29.85 KG/M2 | HEART RATE: 58 BPM | DIASTOLIC BLOOD PRESSURE: 80 MMHG

## 2018-03-14 DIAGNOSIS — G47.00 INSOMNIA, UNSPECIFIED TYPE: ICD-10-CM

## 2018-03-14 DIAGNOSIS — Z00.00 ENCOUNTER FOR PREVENTIVE HEALTH EXAMINATION: Primary | ICD-10-CM

## 2018-03-14 DIAGNOSIS — E78.00 HYPERCHOLESTEROLEMIA: ICD-10-CM

## 2018-03-14 PROCEDURE — 99999 PR PBB SHADOW E&M-EST. PATIENT-LVL III: CPT | Mod: PBBFAC,,,

## 2018-03-14 PROCEDURE — 99499 UNLISTED E&M SERVICE: CPT | Mod: S$GLB,,,

## 2018-03-14 PROCEDURE — 99397 PER PM REEVAL EST PAT 65+ YR: CPT | Mod: S$GLB,,,

## 2018-03-14 RX ORDER — RAMELTEON 8 MG/1
8 TABLET ORAL NIGHTLY
Qty: 90 TABLET | Refills: 1 | Status: SHIPPED | OUTPATIENT
Start: 2018-03-14 | End: 2018-09-19

## 2018-03-14 NOTE — PROGRESS NOTES
Subjective:       Patient ID: Juan Neves is a 73 y.o. male.    Chief Complaint: Annual Exam    HPI Data obtained from Agency [employer] and Research Quality (AHRQ):    GRADE A -The [employer]PSTF recommends the service. There is high certainty that the net benefit is substantial. Offer or provide this service.    GRADE B - The USPSTF recommends the service. There is high certainty that the net benefit is moderate or there is moderate certainty that the net benefit is moderate to substantial. Offer or provide this service.    View All      16 - Recommended (A, B)    Grade Title Risk Info. Details   UTD  Colorectal Cancer: Screening --Adults aged 50 to 75 years     124/80 High Blood Pressure: Screening and Home Monitoring -- Adults     Low  Syphilis: Screening --Asymptomatic, nonpregnant adults and adolescents who are at increased risk for syphilis infection     Non-smoker  Tobacco Smoking Cessation: Behavioral and Pharmacotherapy Interventions -- Adults who are not pregnant     N/A  Abdominal Aortic Aneurysm: Screening -- Men Ages 65 to 75 Years Who Have Ever Smoked.     Denies  Alcohol Misuse: Screening and Behavioral Counseling Interventions in Primary Care -- Adults     Denies  Depression: Screening -- General adult population, including pregnant and postpartum women     Denies  Fall Prevention --Exercise/Physical Therapy ANDVitamin D Supplementation: Community-dwelling Adults 65 Years or Older, Increased Risk for Falls     Yes  Healthful Diet and Physical Activity for CVD Disease Prevention: Counseling -- Adults with CVD Risk Factors     Low  Hepatitis B: Screening -- Nonpregnant Adolescents and Adults At High Risk     UTD  Hepatitis C Virus Infection: Screening--Adults at High Risk and Adults born between 1945 and 1965     Negative  Latent Tuberculosis Infection: Screening -- Asymptomatic adults at increased risk for infection     N/A  Lung Cancer: Screening -- Adults Ages 55-80 who have a 30 pack-year smoking  history and currently smoke or have quit within the past 15 years     Overweight  Obesity: Screening for and Management of-- All Adults       Low Risk  Sexually Transmitted Infections: Behavioral Counseling -- Sexually Active Adolescents and Adults     Not a candidate  Statin Use for the Primary Prevention of CVD: Preventive Medicine -- Adults age 40 to 75 years with no history of CVD, 1 or more CVD risk factors, and a calculated 10-year CVD event risk of 10% or greater.             Review of Systems   Constitutional: Negative.  Negative for activity change and unexpected weight change.   HENT: Negative.  Negative for hearing loss, rhinorrhea and trouble swallowing.    Eyes: Negative.  Negative for discharge and visual disturbance.   Respiratory: Negative.  Negative for chest tightness and wheezing.    Cardiovascular: Negative.  Negative for chest pain and palpitations.   Gastrointestinal: Negative.  Negative for blood in stool, constipation, diarrhea and vomiting.   Endocrine: Positive for polyuria. Negative for polydipsia.   Genitourinary: Negative.  Negative for difficulty urinating, hematuria and urgency.   Musculoskeletal: Positive for arthralgias. Negative for joint swelling and neck pain.   Skin: Negative.    Allergic/Immunologic: Negative.    Neurological: Negative.  Negative for weakness and headaches.   Hematological: Negative.    Psychiatric/Behavioral: Negative.  Negative for confusion and dysphoric mood.   All other systems reviewed and are negative.      He continues to have serious problems with staying asleep. He typically wakes up at 2 AM and can't fall back asleep despite sleep hygiene measures and OTC medications.     Objective:      Vitals:    03/14/18 0842   BP: 124/80   Pulse: (!) 58     Physical Exam   Constitutional: He is oriented to person, place, and time. He appears well-developed and well-nourished. He is cooperative. No distress.   HENT:   Head: Normocephalic and atraumatic.   Right  Ear: Hearing, tympanic membrane, external ear and ear canal normal.   Left Ear: Hearing, external ear and ear canal normal.   Nose: Nose normal.   Mouth/Throat: Oropharynx is clear and moist.   Eyes: Conjunctivae are normal. Pupils are equal, round, and reactive to light.   Neck: Normal range of motion. Neck supple. No thyromegaly present.   Cardiovascular: Normal rate, regular rhythm, normal heart sounds and intact distal pulses.    Pulmonary/Chest: Effort normal and breath sounds normal. No respiratory distress.   Musculoskeletal: Normal range of motion. He exhibits no edema or tenderness.   Lymphadenopathy:     He has no cervical adenopathy.   Neurological: He is alert and oriented to person, place, and time. He has normal strength. Coordination and gait normal.   Skin: Skin is warm, dry and intact. No cyanosis. Nails show no clubbing.   Psychiatric: He has a normal mood and affect. His speech is normal and behavior is normal. Judgment and thought content normal. Cognition and memory are normal.   Vitals reviewed.      Assessment:       1. Encounter for preventive health examination    2. Insomnia, unspecified type    3. Hypercholesterolemia        Plan:       Encounter for preventive health examination    Insomnia, unspecified type  -     TSH; Future; Expected date: 03/14/2018    Hypercholesterolemia  -     CBC auto differential; Future; Expected date: 03/14/2018  -     Comprehensive metabolic panel; Future; Expected date: 03/14/2018  -     Lipid panel; Future; Expected date: 03/14/2018    Other orders  -     ramelteon (ROZEREM) 8 mg tablet; Take 1 tablet (8 mg total) by mouth every evening.  Dispense: 90 tablet; Refill: 1      Follow-up in about 1 year (around 3/14/2019).

## 2018-03-15 ENCOUNTER — TELEPHONE (OUTPATIENT)
Dept: FAMILY MEDICINE | Facility: CLINIC | Age: 73
End: 2018-03-15

## 2018-03-15 ENCOUNTER — PATIENT MESSAGE (OUTPATIENT)
Dept: FAMILY MEDICINE | Facility: CLINIC | Age: 73
End: 2018-03-15

## 2018-03-15 RX ORDER — ZOLPIDEM TARTRATE 5 MG/1
5 TABLET ORAL NIGHTLY PRN
Qty: 30 TABLET | Refills: 5 | Status: SHIPPED | OUTPATIENT
Start: 2018-03-15 | End: 2019-04-01

## 2018-09-19 ENCOUNTER — OFFICE VISIT (OUTPATIENT)
Dept: FAMILY MEDICINE | Facility: CLINIC | Age: 73
End: 2018-09-19
Payer: MEDICARE

## 2018-09-19 VITALS
WEIGHT: 235 LBS | OXYGEN SATURATION: 97 % | HEIGHT: 74 IN | BODY MASS INDEX: 30.16 KG/M2 | DIASTOLIC BLOOD PRESSURE: 88 MMHG | TEMPERATURE: 99 F | HEART RATE: 58 BPM | SYSTOLIC BLOOD PRESSURE: 120 MMHG

## 2018-09-19 DIAGNOSIS — E78.00 PURE HYPERCHOLESTEROLEMIA: ICD-10-CM

## 2018-09-19 DIAGNOSIS — F51.01 PRIMARY INSOMNIA: Chronic | ICD-10-CM

## 2018-09-19 PROBLEM — G47.33 OSA (OBSTRUCTIVE SLEEP APNEA): Chronic | Status: ACTIVE | Noted: 2017-01-11

## 2018-09-19 PROCEDURE — 99214 OFFICE O/P EST MOD 30 MIN: CPT | Mod: S$PBB,,, | Performed by: INTERNAL MEDICINE

## 2018-09-19 PROCEDURE — 1101F PT FALLS ASSESS-DOCD LE1/YR: CPT | Mod: CPTII,,, | Performed by: INTERNAL MEDICINE

## 2018-09-19 PROCEDURE — 99213 OFFICE O/P EST LOW 20 MIN: CPT | Mod: PBBFAC,PO | Performed by: INTERNAL MEDICINE

## 2018-09-19 PROCEDURE — 99999 PR PBB SHADOW E&M-EST. PATIENT-LVL III: CPT | Mod: PBBFAC,,, | Performed by: INTERNAL MEDICINE

## 2018-09-19 RX ORDER — TRAZODONE HYDROCHLORIDE 50 MG/1
50 TABLET ORAL NIGHTLY
Qty: 90 TABLET | Refills: 1 | Status: SHIPPED | OUTPATIENT
Start: 2018-09-19 | End: 2019-04-01 | Stop reason: SINTOL

## 2018-09-19 RX ORDER — ATORVASTATIN CALCIUM 20 MG/1
20 TABLET, FILM COATED ORAL DAILY
Qty: 90 TABLET | Refills: 1 | Status: SHIPPED | OUTPATIENT
Start: 2018-09-19 | End: 2019-04-01 | Stop reason: SDUPTHER

## 2018-09-19 NOTE — PROGRESS NOTES
Assessment & Plan (all problems are new to me)  Problem List Items Addressed This Visit        Cardiac/Vascular    Pure hypercholesterolemia    Overview     The 10-year ASCVD risk score ([place] GRACIELA Jr., et al., 2013) is: 19.3% at start of therapy         Current Assessment & Plan     Start statin for risk reduction.          Relevant Medications    atorvastatin (LIPITOR) 20 MG tablet    Other Relevant Orders    CBC auto differential    Comprehensive metabolic panel    Lipid panel       Other    Primary insomnia (Chronic)    Overview     Ambien caused HA.  Rozorem didn't work.  Lunesta not covered         Current Assessment & Plan     Discussed sleep hygiene.  Will provide trial for trazodone.  I have discussed the common side effects of this medication and black box warnings (if applicable to this medication) with the patient and answered all of the questions they had at the time of this visit regarding this medication.          Relevant Medications    traZODone (DESYREL) 50 MG tablet            Health Maintenance reviewed, Flu in Oct.    Follow-up: Follow-up in about 6 months (around 3/19/2019) for Routine Physical.    _____________________________________________________________________    Chief Complaint  Chief Complaint   Patient presents with    Establish Care     new to pcp       HPI  Juan Neves is a 73 y.o. male with multiple medical diagnoses as listed in the medical history and problem list that presents for establish care.  Pt is new to me but is known to this clinic with his last appointment being 03/2018 for his routine physical.  The patient was previously followed by one of my partners that has passed away .  I have reviewed their most recent previous OV with their previous PCP, most recent labs and most recent imaging studies and interpreted them myself.    He reports that he had changed to OTC sleep aids such as ibuprofen PM which helps his sleep for 4-5 hours.  Ambien caused HAs and Rozerem did  not work.  He can fall asleep but often wakes from sleep.  He has JOE but reports that his CPAP made it so that he couldn't sleep.  He tried FFM and nasal but couldn't tolerate it.  He had previously seen sleep medicine and it was recommended to try Lunesta but this was denies by insurance.  He took one of alprazolam from his wife and this worked well.      The 10-year ASCVD risk score ([place] GRACIELA Jr., et al., 2013) is: 19.3%    Values used to calculate the score:      Age: 73 years      Sex: Male      Is Non- : No      Diabetic: No      Tobacco smoker: No      Systolic Blood Pressure: 120 mmHg      Is BP treated: No      HDL Cholesterol: 52 mg/dL      Total Cholesterol: 226 mg/dL    PAST MEDICAL HISTORY:  Past Medical History:   Diagnosis Date    Allergy     Insomnia 2012    Sleep apnea        PAST SURGICAL HISTORY:  Past Surgical History:   Procedure Laterality Date    COLONOSCOPY      COLONOSCOPY N/A 1/23/2017    Procedure: COLONOSCOPY;  Surgeon: NASEEM Iqbal MD;  [facility];  Service: Endoscopy;  Laterality: N/A;    COLONOSCOPY N/A 1/23/2017    Performed by [facility]EM [facility]qbal MD at [facility] ENDO    EXCISION TURBINATE, SUBMUCOUS      GRAFT- Bilateral 3/1/2016    Performed by Mariusz Alejandro III, MD at [facility] OR [address] [place] [address]    NASAL RECONSTRUCTION      with bilateral  grafts    NASAL SEPTUM SURGERY      RECONSTRUCTION-NASAL Bilateral 3/1/2016    Performed by Mariusz Alejandro III, MD at [facility] OR [address] [place] [address]    RESECTION-TURBINATES (SMR) Bilateral 3/1/2016    Performed by Mariusz Alejandro III, MD at [facility] OR [address] [place] [address]    SEPTOPLASTY Bilateral 3/1/2016    Performed by Mariusz Alejandro III, MD at [facility] OR [address] [place] [address]       SOCIAL HISTORY:  Social History     Socioeconomic History    Marital status:      Spouse name: Not on file    Number of children: Not on file    Years of education: Not on file    Highest education level: Not on file   Social Needs    Financial resource  "strain: Not on file    Food insecurity - worry: Not on file    Food insecurity - inability: Not on file    Transportation needs - medical: Not on file    Transportation needs - non-medical: Not on file   Occupational History    Occupation: Retired     Employer: [employer]   Tobacco Use    Smoking status: Never Smoker    Smokeless tobacco: Never Used    Tobacco comment: Pt smokes cigars periodically (6x's a year)   Substance and Sexual Activity    Alcohol use: Yes     Comment:  ( 1 glass of wine and 1 scotch)    Drug use: No    Sexual activity: Yes     Partners: Female   Other Topics Concern    Not on file   Social History Narrative    He is retired from the oil field. Lives with his wife in [place]. He is from [place]. Non-smoker. He drinks a lot of wine. He has 2 adult daughters. He enjoys gardening and golf. He was in the [employer]. 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                    FAMILY HISTORY:  Family History   Problem Relation Age of Onset    Acne Daughter     Diabetes Mother     Cancer Father     Diabetes Sister     Cancer Sister     No Known Problems Daughter     Coronary artery disease Neg Hx     Heart disease Neg Hx     Melanoma Neg Hx     Psoriasis Neg Hx     Eczema Neg Hx     Lupus Neg Hx        ALLERGIES AND MEDICATIONS: updated and reviewed.  Review of patient's allergies indicates:   Allergen Reactions    Pcn [penicillins] Other (See Comments)     pt. reports having a "blackout"     Current Outpatient Medications   Medication Sig Dispense Refill    fluticasone (FLONASE) 50 mcg/actuation nasal spray 1 spray by Each Nare route once daily. 3 Bottle 3    atorvastatin (LIPITOR) 20 MG tablet Take 1 tablet (20 mg total) by mouth once daily. 90 tablet 1    traZODone (DESYREL) 50 MG tablet Take 1 tablet (50 mg total) by mouth every evening. 90 tablet 1    zolpidem (AMBIEN) 5 MG Tab Take 1 tablet (5 mg total) by mouth nightly as needed. 30 tablet 5     No current " "facility-administered medications for this visit.          ROS  Review of Systems   Constitutional: Negative for activity change, chills, fatigue, fever and unexpected weight change.   HENT: Negative for congestion, dental problem, ear discharge, ear pain, hearing loss, postnasal drip, rhinorrhea, sinus pressure, sore throat and trouble swallowing.    Eyes: Negative for pain, discharge, redness, itching and visual disturbance.   Respiratory: Negative for cough, chest tightness, shortness of breath and wheezing.    Cardiovascular: Negative for chest pain, palpitations and leg swelling.   Gastrointestinal: Negative for abdominal distention, abdominal pain, blood in stool, constipation, diarrhea, nausea and vomiting.        No melena   Endocrine: Positive for polyuria. Negative for cold intolerance, heat intolerance, polydipsia and polyphagia.        No nocturia   Genitourinary: Negative for decreased urine volume, difficulty urinating, discharge, dysuria, flank pain, frequency, hematuria, penile pain, penile swelling, scrotal swelling, testicular pain and urgency.   Musculoskeletal: Negative for arthralgias, gait problem, joint swelling, myalgias, neck pain and neck stiffness.   Skin: Negative for color change, pallor and rash.   Neurological: Negative for dizziness, tremors, seizures, syncope, weakness, light-headedness and headaches.   Hematological: Negative for adenopathy. Does not bruise/bleed easily.   Psychiatric/Behavioral: Positive for sleep disturbance. Negative for confusion, decreased concentration, dysphoric mood and suicidal ideas. The patient is not nervous/anxious.         No depression         Physical Exam  Vitals:    09/19/18 1000   BP: 120/88   Pulse: (!) 58   Temp: 98.7 °F (37.1 °C)   SpO2: 97%   Weight: 106.6 kg (235 lb)   Height: 6' 2" (1.88 m)    Body mass index is 30.17 kg/m².  Weight: 106.6 kg (235 lb)   Height: 6' 2" (188 cm)   Physical Exam   Constitutional: He is oriented to person, place, " and time. He appears well-developed and well-nourished. No distress.   HENT:   Head: Normocephalic and atraumatic.   Eyes: Conjunctivae and EOM are normal. Pupils are equal, round, and reactive to light.   Neck: Neck supple. No JVD present. Carotid bruit is not present.   Cardiovascular: Normal rate, regular rhythm, normal heart sounds and intact distal pulses.   Pulmonary/Chest: Effort normal and breath sounds normal. No respiratory distress.   Neurological: He is alert and oriented to person, place, and time.   Symmetric facial movements palate elevated symmetrically tongue midline            Health Maintenance       Date Due Completion Date    Influenza Vaccine 08/01/2018 3/1/2018 (Declined)    Override on 3/1/2018: Declined    Lipid Panel 03/15/2023 3/15/2018    TETANUS VACCINE 08/04/2026 8/4/2016    Colonoscopy 01/23/2027 1/23/2017

## 2018-09-19 NOTE — ASSESSMENT & PLAN NOTE
Discussed sleep hygiene.  Will provide trial for trazodone.  I have discussed the common side effects of this medication and black box warnings (if applicable to this medication) with the patient and answered all of the questions they had at the time of this visit regarding this medication.

## 2018-09-19 NOTE — PATIENT INSTRUCTIONS
We are going try some trazodone as needed.  You can increase this to 2 tabs at night if 1 tab doesn't work.     Try the writing exercises.     We are also starting a once a day cholesterol medication to reduce you heart attack and stroke risk.

## 2018-11-02 ENCOUNTER — OFFICE VISIT (OUTPATIENT)
Dept: PODIATRY | Facility: CLINIC | Age: 73
End: 2018-11-02
Payer: MEDICARE

## 2018-11-02 VITALS
HEART RATE: 63 BPM | BODY MASS INDEX: 30.03 KG/M2 | SYSTOLIC BLOOD PRESSURE: 132 MMHG | DIASTOLIC BLOOD PRESSURE: 64 MMHG | RESPIRATION RATE: 16 BRPM | WEIGHT: 234 LBS | HEIGHT: 74 IN

## 2018-11-02 DIAGNOSIS — L84 CORN OR CALLUS: Primary | ICD-10-CM

## 2018-11-02 PROCEDURE — 1101F PT FALLS ASSESS-DOCD LE1/YR: CPT | Mod: CPTII,S$GLB,, | Performed by: PODIATRIST

## 2018-11-02 PROCEDURE — 99999 PR PBB SHADOW E&M-EST. PATIENT-LVL III: CPT | Mod: PBBFAC,,, | Performed by: PODIATRIST

## 2018-11-02 PROCEDURE — 99213 OFFICE O/P EST LOW 20 MIN: CPT | Mod: S$GLB,,, | Performed by: PODIATRIST

## 2018-11-02 NOTE — PROGRESS NOTES
"Subjective:      Patient ID: Juan Neves is a 73 y.o. male.    Chief Complaint: Callouses (bilateral foot) and Foot Pain (due to callouses)    He is presenting for painful callus x 2 both at medial hallux. Denies other symptoms.   He usually debrides himself but wanted to know if there are other treatment options.      Review of Systems   Constitution: Negative for decreased appetite, fever, weakness and malaise/fatigue.   HENT: Negative for congestion.    Cardiovascular: Negative for chest pain and leg swelling.   Respiratory: Negative for cough and shortness of breath.    Skin: Negative for color change, nail changes and rash.   Musculoskeletal: Negative for arthritis, joint pain, joint swelling and muscle weakness.   Gastrointestinal: Negative for bloating, abdominal pain, nausea and vomiting.   Neurological: Negative for headaches and numbness.   Psychiatric/Behavioral: Negative for altered mental status.     Vitals:    11/02/18 1435   BP: 132/64   Pulse: 63   Resp: 16   Weight: 106.1 kg (234 lb)   Height: 6' 2" (1.88 m)   PainSc: 0-No pain             Objective:      Physical Exam   Constitutional: He is oriented to person, place, and time. He appears well-developed and well-nourished. No distress.   Musculoskeletal: Normal range of motion. He exhibits tenderness. He exhibits no edema or deformity.   Neurological: He is alert and oriented to person, place, and time.   Skin: Skin is warm. Capillary refill takes 2 to 3 seconds.   Psychiatric: He has a normal mood and affect.     Vascular: Distal DP/PT pulses palpable 2/4 b/l. CRT < 3 sec to tips of toes. No edema noted to b/l LE. No vericosities noted to b/l LEs. Hair growth present b/l LE, warm to touch b/l LE    Dermatologic: No open lesions, lacerations or wounds. Nails are normal R 1-5 and L 1-5. Interdigital spaces clean, dry and intact b/l. No erythema, rubor, calor noted to b/l LE  B/L hallux: callus x 2 along the medial hallux     Musculoskeletal: " Equinus noted b/l ankles with < 10 deg DF noted b/l. MMT 5/5 in DF/PF/Inv/Ev resistance with no reproduction of pain in any direction. Passive range of motion of ankle and pedal joints is painless b/l. No calf tenderness b/l LE, Compartments soft/compressible b/l.     Neurological: Light touch, proprioception, and sharp/dull sensation are all intact b/l. Protective threshold with the Chancellor-Wienstein monofilament is intact b/l. Vibratory sensation intact b/l.           Assessment:       Encounter Diagnosis   Name Primary?    Corn or callus Yes         Plan:       Juan was seen today for callouses and foot pain.    Diagnoses and all orders for this visit:    Corn or callus      I counseled the patient on his conditions, their implications and medical management.    -74 y/o male with painful callus x 2 along medial hallux    -sharply debrided with #15 blade. Tolerated well. (courtesy)  -etiology explained to patient  -wide toe shoe box was recommended  -The nature of the condition, options for management, as well as potential risks and complications were discussed in detail with patient. Patient was amenable to my recommendations and left my office fully informed and will follow up as instructed or sooner if necessary.    -f/u prn

## 2019-02-23 ENCOUNTER — PATIENT MESSAGE (OUTPATIENT)
Dept: FAMILY MEDICINE | Facility: CLINIC | Age: 74
End: 2019-02-23

## 2019-03-18 ENCOUNTER — PATIENT MESSAGE (OUTPATIENT)
Dept: FAMILY MEDICINE | Facility: CLINIC | Age: 74
End: 2019-03-18

## 2019-04-01 ENCOUNTER — OFFICE VISIT (OUTPATIENT)
Dept: FAMILY MEDICINE | Facility: CLINIC | Age: 74
End: 2019-04-01
Payer: MEDICARE

## 2019-04-01 VITALS
BODY MASS INDEX: 28.79 KG/M2 | RESPIRATION RATE: 18 BRPM | HEIGHT: 74 IN | TEMPERATURE: 98 F | DIASTOLIC BLOOD PRESSURE: 80 MMHG | OXYGEN SATURATION: 97 % | HEART RATE: 61 BPM | SYSTOLIC BLOOD PRESSURE: 124 MMHG | WEIGHT: 224.31 LBS

## 2019-04-01 DIAGNOSIS — G47.33 OSA (OBSTRUCTIVE SLEEP APNEA): Chronic | ICD-10-CM

## 2019-04-01 DIAGNOSIS — Z00.00 ROUTINE MEDICAL EXAM: Primary | ICD-10-CM

## 2019-04-01 DIAGNOSIS — F51.01 PRIMARY INSOMNIA: Chronic | ICD-10-CM

## 2019-04-01 DIAGNOSIS — E78.00 PURE HYPERCHOLESTEROLEMIA: ICD-10-CM

## 2019-04-01 PROCEDURE — 99999 PR PBB SHADOW E&M-EST. PATIENT-LVL III: ICD-10-PCS | Mod: PBBFAC,HCNC,, | Performed by: INTERNAL MEDICINE

## 2019-04-01 PROCEDURE — 99397 PER PM REEVAL EST PAT 65+ YR: CPT | Mod: HCNC,S$GLB,, | Performed by: INTERNAL MEDICINE

## 2019-04-01 PROCEDURE — 99999 PR PBB SHADOW E&M-EST. PATIENT-LVL III: CPT | Mod: PBBFAC,HCNC,, | Performed by: INTERNAL MEDICINE

## 2019-04-01 PROCEDURE — 99397 PR PREVENTIVE VISIT,EST,65 & OVER: ICD-10-PCS | Mod: HCNC,S$GLB,, | Performed by: INTERNAL MEDICINE

## 2019-04-01 RX ORDER — ATORVASTATIN CALCIUM 20 MG/1
20 TABLET, FILM COATED ORAL DAILY
Qty: 90 TABLET | Refills: 3 | Status: SHIPPED | OUTPATIENT
Start: 2019-04-01 | End: 2020-05-27

## 2019-04-01 RX ORDER — MIRTAZAPINE 15 MG/1
15 TABLET, FILM COATED ORAL NIGHTLY
Qty: 90 TABLET | Refills: 0 | Status: SHIPPED | OUTPATIENT
Start: 2019-04-01 | End: 2019-08-19 | Stop reason: SDUPTHER

## 2019-04-01 NOTE — PROGRESS NOTES
Assessment & Plan  Problem List Items Addressed This Visit        Cardiac/Vascular    Pure hypercholesterolemia (Chronic)    Overview     The 10-year ASCVD risk score ([place] OWENS Jr., et al., 2013) is: 19.3% at start of therapy         Current Assessment & Plan     The current medical regimen is effective;  continue present plan and medications.           Relevant Medications    atorvastatin (LIPITOR) 20 MG tablet    Other Relevant Orders    Comprehensive metabolic panel    Lipid panel       Other    Primary insomnia (Chronic)    Overview     Ambien caused HA.  Rozorem didn't work.  Lunesta not covered.  Trazodone caused HA.          Relevant Medications    mirtazapine (REMERON) 15 MG tablet    JOE (obstructive sleep apnea) (Chronic)    Overview     Noted in Sleep Med progress notes dated 3/7/16.         Current Assessment & Plan     Previously with difficulty tolerating CPAP.  Will refer back to sleep medicine if mirtazapine not tolerated.            Other Visit Diagnoses     Routine medical exam    -  Primary  -    Discussed healthy diet, regular exercise, necessary labs, age appropriate cancer screening, and routine vaccinations.               Health Maintenance reviewed, up to date.    Follow-up: Follow up in about 1 year (around 4/1/2020) for Routine Physical.    ______________________________________________________________________    Chief Complaint  Chief Complaint   Patient presents with    Annual Exam       HPI  Juan Neves is a 74 y.o. male with multiple medical diagnoses as listed in the medical history and problem list that presents for routine physical.  Pt is known to me with his last appointment 09/2018.  He had labs prior to this OV that showed reassuring CBC, CMP, and controlled lipids.     No acute complaints.  He needs a short refill at his [facility] until his [employer] Rx arrives.     Reports that the trazodone caused HAs.  He has been using Z-quil which helps him fall asleep but is causing  severe dry mouth and dry eyes.  Having to get up to drink water and use eye drops.         PAST MEDICAL HISTORY:  Past Medical History:   Diagnosis Date    Allergy     Insomnia 2012    Sleep apnea        PAST SURGICAL HISTORY:  Past Surgical History:   Procedure Laterality Date    COLONOSCOPY      COLONOSCOPY N/A 1/23/2017    Per[facility]    EXCISION TURBINATE, SUBMUCOUS      GRAFT- Bilateral 3/1/2016    [facility]    NASAL RECONSTRUCTION      with bilateral  grafts    NASAL SEPTUM SURGERY      RECONSTRUCTION-NASAL Bilateral 3/1/2016    Performed by Mariusz Alejandro III, MD at [facility] OR [address] [place] [address]    RESECTION-TURBINATES (SMR) Bilateral 3/1/2016    Performed by Mariusz Alejandro III, MD at [facility] [place] [address] [place] [address]    SEPTOPLASTY Bilateral 3/1/2016    Performed by Mariusz Alejandro III, MD at [facility] [place] [address] [place] [address]       SOCIAL HISTORY:  Social History     Socioeconomic History    Marital status:      Spouse name: Not on file    Number of children: Not on file    Years of education: Not on file    Highest education level: Not on file   Occupational History    Occupation: Retired     Employer: [employer]   Social Needs    Financial resource strain: Not on file    Food insecurity:     Worry: Not on file     Inability: Not on file    Transportation needs:     Medical: Not on file     Non-medical: Not on file   Tobacco Use    Smoking status: Never Smoker    Smokeless tobacco: Never Used    Tobacco comment: Pt smokes cigars periodically (6x's a year)   Substance and Sexual Activity    Alcohol use: Yes     Comment:  ( 1 glass of wine and 1 scotch)    Drug use: No    Sexual activity: Yes     Partners: Female   Lifestyle    Physical activity:     Days per week: Not on file     Minutes per session: Not on file    Stress: Not on file   Relationships    Social connections:     Talks on phone: Not on file     Gets together:  "Not on file     Attends Caodaism service: Not on file     Active member of club or organization: Not on file     Attends meetings of clubs or organizations: Not on file     Relationship status: Not on file    Intimate partner violence:     Fear of current or ex partner: Not on file     Emotionally abused: Not on file     Physically abused: Not on file     Forced sexual activity: Not on file   Other Topics Concern    Not on file   Social History Narrative    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 He is from [place]. Non-smoker. He drinks a lot of wine. He has 2 adult daughters. He enjoys gardening and golf. He was in the [employer]. 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                    FAMILY HISTORY:  Family History   Problem Relation Age of Onset    Acne Daughter     Diabetes Mother     Cancer Father     Diabetes Sister     Cancer Sister     No Known Problems Daughter     Coronary artery disease Neg Hx     Heart disease Neg Hx     Melanoma Neg Hx     Psoriasis Neg Hx     Eczema Neg Hx     Lupus Neg Hx        ALLERGIES AND MEDICATIONS: updated and reviewed.  Review of patient's allergies indicates:   Allergen Reactions    Pcn [penicillins] Other (See Comments)     pt. reports having a "blackout"    Trazodone Other (See Comments)     headaches     Current Outpatient Medications   Medication Sig Dispense Refill    atorvastatin (LIPITOR) 20 MG tablet Take 1 tablet (20 mg total) by mouth once daily. 90 tablet 3    fluticasone (FLONASE) 50 mcg/actuation nasal spray 1 spray by Each Nare route once daily. 3 Bottle 3    mirtazapine (REMERON) 15 MG tablet Take 1 tablet (15 mg total) by mouth every evening. 90 tablet 0     No current facility-administered medications for this visit.          ROS  Review of Systems   Constitutional: Negative for chills, fever and unexpected weight change.   HENT: Negative for congestion, dental problem, ear pain, hearing loss, rhinorrhea, sore " "throat and trouble swallowing.    Eyes: Negative for discharge, redness and visual disturbance.   Respiratory: Negative for cough, chest tightness, shortness of breath and wheezing.    Cardiovascular: Negative for chest pain, palpitations and leg swelling.   Gastrointestinal: Negative for abdominal pain, constipation, diarrhea, nausea and vomiting.   Endocrine: Negative for polydipsia, polyphagia and polyuria.   Genitourinary: Negative for decreased urine volume, dysuria and hematuria.   Musculoskeletal: Negative for arthralgias and myalgias.   Skin: Negative for color change and rash.   Neurological: Negative for dizziness, weakness, light-headedness and headaches.   Psychiatric/Behavioral: Negative for decreased concentration, dysphoric mood, sleep disturbance and suicidal ideas.           Physical Exam  Vitals:    04/01/19 1344   BP: 124/80   Pulse: 61   Resp: 18   Temp: 97.6 °F (36.4 °C)   TempSrc: Oral   SpO2: 97%   Weight: 101.8 kg (224 lb 5.1 oz)   Height: 6' 2" (1.88 m)    Body mass index is 28.8 kg/m².  Weight: 101.8 kg (224 lb 5.1 oz)   Height: 6' 2" (188 cm)   Physical Exam   Constitutional: He is oriented to person, place, and time. He appears well-developed and well-nourished. No distress.   HENT:   Head: Normocephalic and atraumatic.   Right Ear: Tympanic membrane, external ear and ear canal normal.   Left Ear: Tympanic membrane, external ear and ear canal normal.   Nose: Nose normal. No septal deviation. Right sinus exhibits no maxillary sinus tenderness and no frontal sinus tenderness. Left sinus exhibits no maxillary sinus tenderness and no frontal sinus tenderness.   Mouth/Throat: Uvula is midline, oropharynx is clear and moist and mucous membranes are normal. No tonsillar exudate.   Eyes: Pupils are equal, round, and reactive to light. Conjunctivae and EOM are normal. Right eye exhibits no discharge. Left eye exhibits no discharge. No scleral icterus.   Neck: Neck supple. No JVD present. No " spinous process tenderness and no muscular tenderness present. Carotid bruit is not present. No tracheal deviation present. No thyroid mass and no thyromegaly present.   Cardiovascular: Normal rate, regular rhythm, normal heart sounds and intact distal pulses. Exam reveals no S3, no S4 and no friction rub.   No murmur heard.  Pulmonary/Chest: Effort normal and breath sounds normal. He has no wheezes. He has no rhonchi. He has no rales.   Abdominal: Soft. Bowel sounds are normal. He exhibits no distension. There is no tenderness. There is no rebound, no guarding and no CVA tenderness.   Musculoskeletal: Normal range of motion. He exhibits no edema or tenderness.   Lymphadenopathy:        Head (right side): No submental and no submandibular adenopathy present.        Head (left side): No submental and no submandibular adenopathy present.     He has no cervical adenopathy.   Neurological: He is alert and oriented to person, place, and time. Coordination normal.   Motor grossly intact.  Sensation grossly normal.  Symmetric facial movements palate elevated symmetrically tongue midline    Skin: Skin is warm and dry. Capillary refill takes less than 2 seconds. No rash noted. No cyanosis. Nails show no clubbing.   Psychiatric: He has a normal mood and affect. His speech is normal and behavior is normal. Thought content normal. Cognition and memory are normal.         Health Maintenance       Date Due Completion Date    Lipid Panel 03/26/2024 3/26/2019    TETANUS VACCINE 08/04/2026 8/4/2016    Colonoscopy 01/23/2027 1/23/2017

## 2019-04-01 NOTE — ASSESSMENT & PLAN NOTE
Previously with difficulty tolerating CPAP.  Will refer back to sleep medicine if mirtazapine not tolerated.

## 2019-04-17 ENCOUNTER — PATIENT MESSAGE (OUTPATIENT)
Dept: FAMILY MEDICINE | Facility: CLINIC | Age: 74
End: 2019-04-17

## 2019-04-17 DIAGNOSIS — G47.33 OSA (OBSTRUCTIVE SLEEP APNEA): Primary | Chronic | ICD-10-CM

## 2019-04-17 DIAGNOSIS — F51.01 PRIMARY INSOMNIA: Chronic | ICD-10-CM

## 2019-05-08 ENCOUNTER — TELEPHONE (OUTPATIENT)
Dept: FAMILY MEDICINE | Facility: CLINIC | Age: 74
End: 2019-05-08

## 2019-07-31 ENCOUNTER — PATIENT MESSAGE (OUTPATIENT)
Dept: FAMILY MEDICINE | Facility: CLINIC | Age: 74
End: 2019-07-31

## 2019-07-31 DIAGNOSIS — F51.01 PRIMARY INSOMNIA: Chronic | ICD-10-CM

## 2019-07-31 DIAGNOSIS — G47.33 OSA (OBSTRUCTIVE SLEEP APNEA): Primary | Chronic | ICD-10-CM

## 2019-08-07 ENCOUNTER — PATIENT MESSAGE (OUTPATIENT)
Dept: OPTOMETRY | Facility: CLINIC | Age: 74
End: 2019-08-07

## 2019-08-07 ENCOUNTER — OFFICE VISIT (OUTPATIENT)
Dept: OPTOMETRY | Facility: CLINIC | Age: 74
End: 2019-08-07
Payer: MEDICARE

## 2019-08-07 DIAGNOSIS — H52.03 HYPEROPIA WITH PRESBYOPIA OF BOTH EYES: ICD-10-CM

## 2019-08-07 DIAGNOSIS — H25.13 NUCLEAR SCLEROSIS OF BOTH EYES: Primary | ICD-10-CM

## 2019-08-07 DIAGNOSIS — H52.4 HYPEROPIA WITH PRESBYOPIA OF BOTH EYES: ICD-10-CM

## 2019-08-07 PROCEDURE — 92014 PR EYE EXAM, EST PATIENT,COMPREHESV: ICD-10-PCS | Mod: HCNC,S$GLB,, | Performed by: OPTOMETRIST

## 2019-08-07 PROCEDURE — 92014 COMPRE OPH EXAM EST PT 1/>: CPT | Mod: HCNC,S$GLB,, | Performed by: OPTOMETRIST

## 2019-08-07 PROCEDURE — 99999 PR PBB SHADOW E&M-EST. PATIENT-LVL II: ICD-10-PCS | Mod: PBBFAC,HCNC,, | Performed by: OPTOMETRIST

## 2019-08-07 PROCEDURE — 99999 PR PBB SHADOW E&M-EST. PATIENT-LVL II: CPT | Mod: PBBFAC,HCNC,, | Performed by: OPTOMETRIST

## 2019-08-18 ENCOUNTER — OFFICE VISIT (OUTPATIENT)
Dept: URGENT CARE | Facility: CLINIC | Age: 74
End: 2019-08-18
Payer: MEDICARE

## 2019-08-18 VITALS
RESPIRATION RATE: 16 BRPM | DIASTOLIC BLOOD PRESSURE: 79 MMHG | TEMPERATURE: 98 F | SYSTOLIC BLOOD PRESSURE: 157 MMHG | HEART RATE: 58 BPM | HEIGHT: 74 IN | BODY MASS INDEX: 30.03 KG/M2 | OXYGEN SATURATION: 98 % | WEIGHT: 234 LBS

## 2019-08-18 DIAGNOSIS — T63.464A WASP STING, UNDETERMINED INTENT, INITIAL ENCOUNTER: Primary | ICD-10-CM

## 2019-08-18 PROCEDURE — 96372 THER/PROPH/DIAG INJ SC/IM: CPT | Mod: S$GLB,,, | Performed by: FAMILY MEDICINE

## 2019-08-18 PROCEDURE — 1101F PR PT FALLS ASSESS DOC 0-1 FALLS W/OUT INJ PAST YR: ICD-10-PCS | Mod: CPTII,S$GLB,, | Performed by: PHYSICIAN ASSISTANT

## 2019-08-18 PROCEDURE — 99214 PR OFFICE/OUTPT VISIT, EST, LEVL IV, 30-39 MIN: ICD-10-PCS | Mod: 25,S$GLB,, | Performed by: PHYSICIAN ASSISTANT

## 2019-08-18 PROCEDURE — 1101F PT FALLS ASSESS-DOCD LE1/YR: CPT | Mod: CPTII,S$GLB,, | Performed by: PHYSICIAN ASSISTANT

## 2019-08-18 PROCEDURE — 96372 PR INJECTION,THERAP/PROPH/DIAG2ST, IM OR SUBCUT: ICD-10-PCS | Mod: S$GLB,,, | Performed by: FAMILY MEDICINE

## 2019-08-18 PROCEDURE — 99214 OFFICE O/P EST MOD 30 MIN: CPT | Mod: 25,S$GLB,, | Performed by: PHYSICIAN ASSISTANT

## 2019-08-18 RX ORDER — CLINDAMYCIN HYDROCHLORIDE 300 MG/1
300 CAPSULE ORAL 4 TIMES DAILY
Qty: 28 CAPSULE | Refills: 0 | Status: SHIPPED | OUTPATIENT
Start: 2019-08-18 | End: 2019-08-25

## 2019-08-18 RX ORDER — BETAMETHASONE SODIUM PHOSPHATE AND BETAMETHASONE ACETATE 3; 3 MG/ML; MG/ML
9 INJECTION, SUSPENSION INTRA-ARTICULAR; INTRALESIONAL; INTRAMUSCULAR; SOFT TISSUE
Status: COMPLETED | OUTPATIENT
Start: 2019-08-18 | End: 2019-08-18

## 2019-08-18 RX ADMIN — BETAMETHASONE SODIUM PHOSPHATE AND BETAMETHASONE ACETATE 9 MG: 3; 3 INJECTION, SUSPENSION INTRA-ARTICULAR; INTRALESIONAL; INTRAMUSCULAR; SOFT TISSUE at 04:08

## 2019-08-18 NOTE — PATIENT INSTRUCTIONS
- Rest.    - Drink plenty of fluids.    - Tylenol or Ibuprofen as directed as needed for fever/pain. Avoid tylenol if you have a history of liver disease. Do not take ibuprofen if you have a history of GI bleeding, kidney disease, or if you take blood thinners.     - Take over-the-counter claritin, zyrtec, allegra, or xyzal as directed for itching/rash/allergic reaction.    - you can take Benadryl over-the-counter as directed as well for itching/rash/allergic reaction.  - you can also take over-the-counter Zantac (heartburn medication) for itching/rash/allergic reaction    - You received a steroid shot today.  This can elevate your blood pressure, elevate your blood sugar, water weight gain, nervous energy, redness to the face and dimpling of the skin where the shot goes in.   - Do not use steroids more than 3 times per year.   - If you have diabetes, please check you blood sugar frequently.  - If you have high blood pressure, please check your blood pressure frequently.     - Ice for 15-20 minutes at a time for the next 24-48 hours.    - Elevate when possible.       - You were given a prescription for antibiotic, but do not start taking it yet.  Wait 1-3 days to see if your symptoms improve without it.  If they don't or you get significantly worse, then start the antibiotics.  - You have been given an antibiotic to treat your condition today.    - Please complete the antibiotic as directed on the bottle.   - If you are female and on oral birth control pills, use additional methods to prevent pregnancy while on antibiotics and for one cycle after.   - you can take over-the-counter probiotics during and after antibiotic use to help preserve gut nakul and reduce gastrointestinal symptoms  - take clindamycin antibiotic with food as this may cause upset stomach and diarrhea.    - Follow up with your PCP or specialty clinic as directed in the next 2-3 days if not improved or as needed.  You can call [phone] to  schedule an appointment with the appropriate provider.    - Go to the ER or seek medical attention immediately if you develop new or worsening symptoms.    - You must understand that you have received an Urgent Care treatment only and that you may be released before all of your medical problems are known or treated.   - You, the patient, will arrange for follow up care as instructed.   - If your condition worsens or fails to improve we recommend that you receive another evaluation at the ER immediately or contact your PCP to discuss your concerns or return here.       Insect Sting: Local Reaction   You have been stung or bitten by an insect. The insects venom or body fluid is causing your skin to react in the area where you were stung or bitten. This often causes redness, itching and swelling. This reaction will fade over a few hours, but it can last a few days. An insect bite or sting can become infected 1 to 3 days later, so watch for the signs below. Sometimes it is hard to tell the difference between a local reaction to the insect bite or sting and an early infection, so you may be given antibiotics.  Common insect stings causing problems are from wasps, bees, yellow jackets, and hornets. Common bites are from spiders, mosquitoes, fleas, or ticks. Other types of insects may be more common in different parts of the country or world.  Most people think of allergic reactions when someone has a rash or itchy skin. Symptoms can include:  · Rash, hives, redness, welts, or blisters  · Itching, burning, stinging, or pain  · Swelling around the sting area.  Sometimes swelling spreads to other areas.  Home care  Medicines  The healthcare provider may prescribe medicines to relieve swelling, itching, and pain. Follow the providers instructions when taking these medicines.  · If you had a severe reaction, the provider may prescribe an epinephrine kit. Epinephrine will stop an allergic reaction from getting worse. Before  you leave the hospital, be sure that you understand when and how to use this medicine.  · Diphenhydramine is an oral antihistamine available at drugstores and groceries. Unless a prescription antihistamine was given, you can use this medicine to reduce itching if large areas of the skin are involved. The medicine may make you sleepy, so be careful using it in the daytime or when going to school, working, or driving. Dont use diphenhydramine if you have glaucoma or if you are a man with trouble urinating because of an enlarged prostate. Other antihistamines cause less drowsiness and are good choices for daytime use. Ask your pharmacist for suggestions.  · Dont use diphenhydramine cream on your skin. In some people it can cause additional reaction and make you allergic to this medicine.  · Calamine lotion or oatmeal baths sometimes help with itching.  · You may use acetaminophen or ibuprofen to control pain, unless another pain medicine was prescribed. Talk with your healthcare provider before using these medicines if you have chronic liver or kidney disease. Also talk with your provider if youve had a stomach ulcer or GI bleeding.  General care    · If itching is a problem, dont take hot showers or baths. Stay out of direct sunlight. These heat up your skin and will make the itching worse.  · Use an ice pack to reduce local areas of redness and itching. You can make your own ice pack by putting ice cubes in a bag that seals and wrapping it in a thin towel. Dont put the ice directly on your skin, because it can damage the skin.  · Try not to scratch any affected areas and damage the skin. This will help prevent an infection.  · If oral antibiotics were prescribed, be sure to take them until finished.  Preventing future reactions  · Future reactions could be worse than this one, so try to stay away from places where you might be stung again.  · Be aware that honeybees nest in trees. Wasps and yellow jackets nest  in the ground, trees, or roof eaves.  · If you are stung by a honeybee, a stinger will remain in your skin. Wasps, yellow jackets, and hornets dont leave a stinger behind. Move away from the nest area right away. The stinger of a honeybee releases a substance that will attract other bees to you. Once you are away from the nest, then remove the stinger as quickly as possible.  · After any sting, you may apply ice and take diphenhydramine or another antihistamine. If you develop any of the warning signs below, seek help right away.  · If you are at high risk for another sting, or if your reaction included dizziness, fainting, or trouble breathing or swallowing, ask your doctor for an insect allergy kit.  · Remove any ticks on the skin with a set of fine tweezers.  the tick as close to the skin as possible. Pull back gently but firmly. Use an even, steady pressure. Dont jerk or twist. Dont squeeze, crush, or puncture the body of the tick. The bodily fluids may contain infection-causing germs. Dont use a smoldering match or cigarette, nail polish, petroleum jelly, liquid soap, or kerosene. These may irritate the tick. If any mouthparts of the tick remain in the skin, these should be left alone. They will fall off on their own. Trying to remove these parts may damage the skin unless they can be removed very easily. After the tick is removed, wash the bite area with rubbing alcohol, iodine, or soap and water.  Follow-up care  Follow up with your doctor in 2 days, or as advised, if your symptoms dont start to get better.  Call 911  Call 911 if any of these occur:  · Trouble breathing or swallowing, or wheezing  · New or worsening swelling in the mouth, throat, or tongue  · Hoarse voice or trouble speaking  · Confused  · Very drowsy or trouble awakening  · Fainting or loss of consciousness  · Rapid heart rate  · Low blood pressure  · Feeling of doom  · Nausea, vomiting, abdominal pain, or diarrhea  · Vomiting  blood, or large amounts of blood in stool  · Seizure  When to seek medical advice  Call your healthcare provider right away if any of these occur:  · Spreading areas of itching, redness or swelling  · New or worse swelling in the face, eyelids, or  lips  · Dizziness or weakness  Also call your provider right away if you have signs of infection:  · Spreading redness  · Increased pain or swelling  · Fever of 100.4°F (38°C) or higher, or as directed by your healthcare provider  · Colored fluid draining from the sting area   Date Last Reviewed: 10/1/2016  © 1454-1694 [employer]. [address] [place] [address]. All rights reserved. This information is not intended as a substitute for professional medical care. Always follow your healthcare professional's instructions.

## 2019-08-18 NOTE — PROGRESS NOTES
"Subjective:       Patient ID: Juan Neves is a 74 y.o. male.    Vitals:  height is 6' 2" (1.88 m) and weight is 106.1 kg (234 lb). His oral temperature is 97.6 °F (36.4 °C). His blood pressure is 157/79 (abnormal) and his pulse is 58 (abnormal). His respiration is 16 and oxygen saturation is 98%.     Chief Complaint: Insect Bite (left arm)    Patient states he was stung by a wasp last night on left arm and swelling has been progressively increasing.  There is pruritus, there is mild discomfort/burning.  He saw the wasp bite him.    Insect Bite   This is a new problem. The current episode started yesterday. The problem occurs daily. The problem has been gradually worsening. Associated symptoms include weakness. Pertinent negatives include no arthralgias, chest pain, chills, congestion, coughing, fatigue, fever, headaches, joint swelling, myalgias, nausea, rash, sore throat, vertigo or vomiting. Nothing aggravates the symptoms. He has tried ice (anti itch cream/benadryl) for the symptoms. The treatment provided no relief.       Constitution: Negative for chills, fatigue and fever.   HENT: Negative for congestion and sore throat.    Neck: Negative for painful lymph nodes.   Cardiovascular: Negative for chest pain and leg swelling.   Eyes: Negative for double vision and blurred vision.   Respiratory: Negative for cough and shortness of breath.    Gastrointestinal: Negative for nausea, vomiting and diarrhea.   Genitourinary: Negative for dysuria, frequency and urgency.   Musculoskeletal: Positive for pain. Negative for joint pain, joint swelling, muscle cramps and muscle ache.   Skin: Positive for color change. Negative for pale, rash and erythema.   Allergic/Immunologic: Positive for itching. Negative for seasonal allergies.   Neurological: Negative for dizziness, history of vertigo, light-headedness, passing out and headaches.   Hematologic/Lymphatic: Negative for swollen lymph nodes, easy bruising/bleeding and " history of blood clots. Does not bruise/bleed easily.   Psychiatric/Behavioral: Negative for nervous/anxious, sleep disturbance and depression. The patient is not nervous/anxious.        Objective:      Physical Exam   Constitutional: He is oriented to person, place, and time. He appears well-developed and well-nourished.   HENT:   Head: Normocephalic and atraumatic. Head is without abrasion, without contusion and without laceration.   Right Ear: External ear normal.   Left Ear: External ear normal.   Nose: Nose normal.   Mouth/Throat: Oropharynx is clear and moist.   Eyes: Pupils are equal, round, and reactive to light. Conjunctivae, EOM and lids are normal.   Neck: Trachea normal, full passive range of motion without pain and phonation normal. Neck supple.   Cardiovascular: Normal rate, regular rhythm and normal heart sounds.   Pulmonary/Chest: Effort normal and breath sounds normal. No stridor. No respiratory distress.   Musculoskeletal: Normal range of motion.        Left shoulder: Normal.        Left wrist: Normal.        Arms:  Neurological: He is alert and oriented to person, place, and time.   Skin: Skin is warm, dry and intact. Capillary refill takes less than 2 seconds. No abrasion, no bruising, no burn, no ecchymosis, no laceration, no lesion and no rash noted. No erythema.   Psychiatric: He has a normal mood and affect. His speech is normal and behavior is normal. Judgment and thought content normal. Cognition and memory are normal.   Nursing note and vitals reviewed.      Assessment:       1. Wasp sting, undetermined intent, initial encounter        Plan:         Wasp sting, undetermined intent, initial encounter  -     clindamycin (CLEOCIN) 300 MG capsule; Take 1 capsule (300 mg total) by mouth 4 (four) times daily. for 7 days  Dispense: 28 capsule; Refill: 0  -     betamethasone acetate-betamethasone sodium phosphate injection 9 mg      Patient Instructions   - Rest.    - Drink plenty of fluids.    -  Tylenol or Ibuprofen as directed as needed for fever/pain. Avoid tylenol if you have a history of liver disease. Do not take ibuprofen if you have a history of GI bleeding, kidney disease, or if you take blood thinners.     - Take over-the-counter claritin, zyrtec, allegra, or xyzal as directed for itching/rash/allergic reaction.    - you can take Benadryl over-the-counter as directed as well for itching/rash/allergic reaction.  - you can also take over-the-counter Zantac (heartburn medication) for itching/rash/allergic reaction    - You received a steroid shot today.  This can elevate your blood pressure, elevate your blood sugar, water weight gain, nervous energy, redness to the face and dimpling of the skin where the shot goes in.   - Do not use steroids more than 3 times per year.   - If you have diabetes, please check you blood sugar frequently.  - If you have high blood pressure, please check your blood pressure frequently.     - Ice for 15-20 minutes at a time for the next 24-48 hours.    - Elevate when possible.       - You were given a prescription for antibiotic, but do not start taking it yet.  Wait 1-3 days to see if your symptoms improve without it.  If they don't or you get significantly worse, then start the antibiotics.  - You have been given an antibiotic to treat your condition today.    - Please complete the antibiotic as directed on the bottle.   - If you are female and on oral birth control pills, use additional methods to prevent pregnancy while on antibiotics and for one cycle after.   - you can take over-the-counter probiotics during and after antibiotic use to help preserve gut nakul and reduce gastrointestinal symptoms  - take clindamycin antibiotic with food as this may cause upset stomach and diarrhea.    - Follow up with your PCP or specialty clinic as directed in the next 2-3 days if not improved or as needed.  You can call [phone] to schedule an appointment with the appropriate  provider.    - Go to the ER or seek medical attention immediately if you develop new or worsening symptoms.    - You must understand that you have received an Urgent Care treatment only and that you may be released before all of your medical problems are known or treated.   - You, the patient, will arrange for follow up care as instructed.   - If your condition worsens or fails to improve we recommend that you receive another evaluation at the ER immediately or contact your PCP to discuss your concerns or return here.       Insect Sting: Local Reaction   You have been stung or bitten by an insect. The insects venom or body fluid is causing your skin to react in the area where you were stung or bitten. This often causes redness, itching and swelling. This reaction will fade over a few hours, but it can last a few days. An insect bite or sting can become infected 1 to 3 days later, so watch for the signs below. Sometimes it is hard to tell the difference between a local reaction to the insect bite or sting and an early infection, so you may be given antibiotics.  Common insect stings causing problems are from wasps, bees, yellow jackets, and hornets. Common bites are from spiders, mosquitoes, fleas, or ticks. Other types of insects may be more common in different parts of the country or world.  Most people think of allergic reactions when someone has a rash or itchy skin. Symptoms can include:  · Rash, hives, redness, welts, or blisters  · Itching, burning, stinging, or pain  · Swelling around the sting area.  Sometimes swelling spreads to other areas.  Home care  Medicines  The healthcare provider may prescribe medicines to relieve swelling, itching, and pain. Follow the providers instructions when taking these medicines.  · If you had a severe reaction, the provider may prescribe an epinephrine kit. Epinephrine will stop an allergic reaction from getting worse. Before you leave the hospital, be sure that you  understand when and how to use this medicine.  · Diphenhydramine is an oral antihistamine available at drugstores and groceries. Unless a prescription antihistamine was given, you can use this medicine to reduce itching if large areas of the skin are involved. The medicine may make you sleepy, so be careful using it in the daytime or when going to school, working, or driving. Dont use diphenhydramine if you have glaucoma or if you are a man with trouble urinating because of an enlarged prostate. Other antihistamines cause less drowsiness and are good choices for daytime use. Ask your pharmacist for suggestions.  · Dont use diphenhydramine cream on your skin. In some people it can cause additional reaction and make you allergic to this medicine.  · Calamine lotion or oatmeal baths sometimes help with itching.  · You may use acetaminophen or ibuprofen to control pain, unless another pain medicine was prescribed. Talk with your healthcare provider before using these medicines if you have chronic liver or kidney disease. Also talk with your provider if youve had a stomach ulcer or GI bleeding.  General care    · If itching is a problem, dont take hot showers or baths. Stay out of direct sunlight. These heat up your skin and will make the itching worse.  · Use an ice pack to reduce local areas of redness and itching. You can make your own ice pack by putting ice cubes in a bag that seals and wrapping it in a thin towel. Dont put the ice directly on your skin, because it can damage the skin.  · Try not to scratch any affected areas and damage the skin. This will help prevent an infection.  · If oral antibiotics were prescribed, be sure to take them until finished.  Preventing future reactions  · Future reactions could be worse than this one, so try to stay away from places where you might be stung again.  · Be aware that honeybees nest in trees. Wasps and yellow jackets nest in the ground, trees, or roof eaves.  · If  you are stung by a honeybee, a stinger will remain in your skin. Wasps, yellow jackets, and hornets dont leave a stinger behind. Move away from the nest area right away. The stinger of a honeybee releases a substance that will attract other bees to you. Once you are away from the nest, then remove the stinger as quickly as possible.  · After any sting, you may apply ice and take diphenhydramine or another antihistamine. If you develop any of the warning signs below, seek help right away.  · If you are at high risk for another sting, or if your reaction included dizziness, fainting, or trouble breathing or swallowing, ask your doctor for an insect allergy kit.  · Remove any ticks on the skin with a set of fine tweezers.  the tick as close to the skin as possible. Pull back gently but firmly. Use an even, steady pressure. Dont jerk or twist. Dont squeeze, crush, or puncture the body of the tick. The bodily fluids may contain infection-causing germs. Dont use a smoldering match or cigarette, nail polish, petroleum jelly, liquid soap, or kerosene. These may irritate the tick. If any mouthparts of the tick remain in the skin, these should be left alone. They will fall off on their own. Trying to remove these parts may damage the skin unless they can be removed very easily. After the tick is removed, wash the bite area with rubbing alcohol, iodine, or soap and water.  Follow-up care  Follow up with your doctor in 2 days, or as advised, if your symptoms dont start to get better.  Call 911  Call 911 if any of these occur:  · Trouble breathing or swallowing, or wheezing  · New or worsening swelling in the mouth, throat, or tongue  · Hoarse voice or trouble speaking  · Confused  · Very drowsy or trouble awakening  · Fainting or loss of consciousness  · Rapid heart rate  · Low blood pressure  · Feeling of doom  · Nausea, vomiting, abdominal pain, or diarrhea  · Vomiting blood, or large amounts of blood in  stool  · Seizure  When to seek medical advice  Call your healthcare provider right away if any of these occur:  · Spreading areas of itching, redness or swelling  · New or worse swelling in the face, eyelids, or  lips  · Dizziness or weakness  Also call your provider right away if you have signs of infection:  · Spreading redness  · Increased pain or swelling  · Fever of 100.4°F (38°C) or higher, or as directed by your healthcare provider  · Colored fluid draining from the sting area   Date Last Reviewed: 10/1/2016  © 9352-0869 [employer]. [address] [place] [id number]. All rights reserved. This information is not intended as a substitute for professional medical care. Always follow your healthcare professional's instructions.

## 2019-08-19 ENCOUNTER — PATIENT MESSAGE (OUTPATIENT)
Dept: FAMILY MEDICINE | Facility: CLINIC | Age: 74
End: 2019-08-19

## 2019-08-19 DIAGNOSIS — F51.01 PRIMARY INSOMNIA: Chronic | ICD-10-CM

## 2019-08-19 RX ORDER — MIRTAZAPINE 15 MG/1
15 TABLET, FILM COATED ORAL NIGHTLY
Qty: 90 TABLET | Refills: 0 | Status: SHIPPED | OUTPATIENT
Start: 2019-08-19 | End: 2020-06-11 | Stop reason: SDUPTHER

## 2019-08-21 ENCOUNTER — TELEPHONE (OUTPATIENT)
Dept: URGENT CARE | Facility: CLINIC | Age: 74
End: 2019-08-21

## 2019-08-21 NOTE — TELEPHONE ENCOUNTER
Call back DOS 08/18/19 - Spoke with patient. He is doing good. He did not have to start antibiotics, the steroid shirt helped a lot.

## 2019-10-06 ENCOUNTER — PATIENT OUTREACH (OUTPATIENT)
Dept: ADMINISTRATIVE | Facility: OTHER | Age: 74
End: 2019-10-06

## 2019-10-08 ENCOUNTER — OFFICE VISIT (OUTPATIENT)
Dept: PULMONOLOGY | Facility: CLINIC | Age: 74
End: 2019-10-08
Payer: MEDICARE

## 2019-10-08 VITALS
HEIGHT: 74 IN | OXYGEN SATURATION: 96 % | WEIGHT: 233.81 LBS | SYSTOLIC BLOOD PRESSURE: 146 MMHG | HEART RATE: 69 BPM | BODY MASS INDEX: 30.01 KG/M2 | DIASTOLIC BLOOD PRESSURE: 76 MMHG

## 2019-10-08 DIAGNOSIS — G47.33 OSA (OBSTRUCTIVE SLEEP APNEA): Primary | ICD-10-CM

## 2019-10-08 DIAGNOSIS — G47.01 INSOMNIA DUE TO MEDICAL CONDITION: ICD-10-CM

## 2019-10-08 PROCEDURE — 99204 OFFICE O/P NEW MOD 45 MIN: CPT | Mod: HCNC,S$GLB,, | Performed by: INTERNAL MEDICINE

## 2019-10-08 PROCEDURE — 99204 PR OFFICE/OUTPT VISIT, NEW, LEVL IV, 45-59 MIN: ICD-10-PCS | Mod: HCNC,S$GLB,, | Performed by: INTERNAL MEDICINE

## 2019-10-08 PROCEDURE — 99999 PR PBB SHADOW E&M-EST. PATIENT-LVL III: ICD-10-PCS | Mod: PBBFAC,HCNC,, | Performed by: INTERNAL MEDICINE

## 2019-10-08 PROCEDURE — 1101F PR PT FALLS ASSESS DOC 0-1 FALLS W/OUT INJ PAST YR: ICD-10-PCS | Mod: HCNC,CPTII,S$GLB, | Performed by: INTERNAL MEDICINE

## 2019-10-08 PROCEDURE — 1101F PT FALLS ASSESS-DOCD LE1/YR: CPT | Mod: HCNC,CPTII,S$GLB, | Performed by: INTERNAL MEDICINE

## 2019-10-08 PROCEDURE — 99999 PR PBB SHADOW E&M-EST. PATIENT-LVL III: CPT | Mod: PBBFAC,HCNC,, | Performed by: INTERNAL MEDICINE

## 2019-10-08 NOTE — PROGRESS NOTES
Juan Neves  was seen as a new patient at the request of  Uri Marrufo MD for the evaluation of  joe.    CHIEF COMPLAINT:    Chief Complaint   Patient presents with    Sleep Apnea    Insomnia       HISTORY OF PRESENT ILLNESS: Juan Neves is a 74 y.o. male is here for sleep evaluation.   Patient was seen by Dr. Emerson in the past.  Last appointment was 3/16.  Patient was diagnosed with joe in 2015 with ahi of 25.  Patient was set up with cpap and subsequently switched to apap.  Patient with lots of dry mouth.  Patient was prescribed lunesta without much improvement.  Currently on mirtazapine with some improvement.  Still with early awakening.        Currently, patient with loud snoring.  +witnessed apnea.  Feeling tire upon awake 3-4 times per week.  No parasomnia.  No cataplexy.  No rls symptoms.      Misenheimer Sleepiness Scale score during initial sleep evaluation was 4.    SLEEP ROUTINE:  Activity the hour prior to sleep: read and watch tv    Bed partner:  wife  Time to bed:  12 am   Lights off:  off  Sleep onset latency:  20 minutes (5-10 minutes with mirtazapine)        Disruptions or awakenings:    1-2 times (no difficulty going back to sleep)    Wakeup time:      5:30 am; lay in bed until 6:30 am  Perceived sleep quality:  Tire on most days       Daytime naps:      Occasional 15-40 minutes  Weekend sleep routine:      same  Caffeine use: 2 cups of coffee in am   exercise habit:   Play pickleball 2-3 times per week.  Gym 3-4 times per week.    PAST MEDICAL HISTORY:    Active Ambulatory Problems     Diagnosis Date Noted    Insomnia due to medical condition 08/13/2012    Dupuytren's contracture of hand 11/30/2016    JOE (obstructive sleep apnea) 01/11/2017    Bilateral bunions 03/01/2018    Pure hypercholesterolemia 09/19/2018     Resolved Ambulatory Problems     Diagnosis Date Noted    BPPV (benign paroxysmal positional vertigo) 08/13/2012    Encounter for preventive health examination  "04/18/2013    Cervical strain 08/19/2013    Anxiety disorder 08/19/2013    Atypical pneumonia 01/30/2014    Nasal obstruction 03/01/2016    Screening for colon cancer 01/23/2017     Past Medical History:   Diagnosis Date    Allergy     Hyperlipidemia     Insomnia 2012    Sleep apnea                 PAST SURGICAL HISTORY:    Past Surgical History:   Procedure Laterality Date    COLONOSCOPY      COLONOSCOPY N/A 1/23/2017    Procedure: COLONOSCOPY;  Surgeon: NASEEM Iqbal MD;  Location: [facility];  Service: Endoscopy;  Laterality: N/A;    EXCISION TURBINATE, SUBMUCOUS      NASAL RECONSTRUCTION      with bilateral  grafts    NASAL SEPTUM SURGERY           FAMILY HISTORY:                Family History   Problem Relation Age of Onset    Acne Daughter     Diabetes Mother     Cancer Father     Diabetes Sister     Cancer Sister     No Known Problems Daughter     Coronary artery disease Neg Hx     Heart disease Neg Hx     Melanoma Neg Hx     Psoriasis Neg Hx     Eczema Neg Hx     Lupus Neg Hx        SOCIAL HISTORY:          Tobacco:   Social History     Tobacco Use   Smoking Status Never Smoker   Smokeless Tobacco Never Used   Tobacco Comment    Pt smokes cigars periodically (6x's a year)       alcohol use:    Social History     Substance and Sexual Activity   Alcohol Use Yes    Comment:  ( 1 glass of wine and 1 scotch)                 Occupation:  Retire; former seismic     ALLERGIES:    Review of patient's allergies indicates:   Allergen Reactions    Pcn [penicillins] Other (See Comments)     pt. reports having a "blackout"       CURRENT MEDICATIONS:    Current Outpatient Medications   Medication Sig Dispense Refill    atorvastatin (LIPITOR) 20 MG tablet Take 1 tablet (20 mg total) by mouth once daily. 90 tablet 3    fluticasone (FLONASE) 50 mcg/actuation nasal spray 1 spray by Each Nare route once daily. 3 Bottle 3    mirtazapine (REMERON) 15 MG tablet Take 1 tablet (15 mg " "total) by mouth every evening. 90 tablet 0     No current facility-administered medications for this visit.                   REVIEW OF SYSTEMS:     Sleep related symptoms as per HPI.  CONST:Denies weight gain    HEENT: Denies sinus congestion  PULM: Denies dyspnea  CARD:  Denies palpitations   GI:  + acid reflux  : Denies polyuria  NEURO: Denies headaches  PSYCH: Denies mood disturbance  HEME: Denies anemia   Otherwise, a balance of systems reviewed is negative.          PHYSICAL EXAM:  Vitals:    10/08/19 1351   BP: (!) 146/76   Pulse: 69   SpO2: 96%   Weight: 106.1 kg (233 lb 12.8 oz)   Height: 6' 2" (1.88 m)   PainSc: 0-No pain     Body mass index is 30.02 kg/m².     GENERAL: Normal development, well groomed  HEENT:  Conjunctivae are non-erythematous; Pupils equal, round, and reactive to light; Nose is symmetrical; Nasal mucosa is pink and moist; Septum is midline; Inferior turbinates are normal; Nasal airflow is normal; Posterior pharynx is pink; Modified Mallampati: 3; Posterior palate is normal; Tonsils +1; Uvula is normal and pink;Tongue is normal; Dentition is fair; No TMJ tenderness; Jaw opening and protrusion without click and without discomfort.  NECK: Supple. Neck circumference is 17 inches. No thyromegaly. No palpable nodes.     SKIN: On face and neck: No abrasions, no rashes, no lesions.  No subcutaneous nodules are palpable.  RESPIRATORY: Chest is clear to auscultation.  Normal chest expansion and non-labored breathing at rest.  CARDIOVASCULAR: Normal S1, S2.  No murmurs, gallops or rubs. No carotid bruits bilaterally.  EXTREMITIES: No edema. No clubbing. No cyanosis. Station normal. Gait normal.        NEURO/PSYCH: Oriented to time, place and person. Normal attention span and concentration. Affect is full. Mood is normal.                                              DATA   10/29/15 ahi 25; cpap 9 cm H20    Lab Results   Component Value Date    TSH 3.130 03/15/2018     ASSESSMENT/PLAN  Problem List " Items Addressed This Visit     Insomnia due to medical condition    Overview     Ambien caused HA.  Rozorem didn't work.  Lunesta not covered.  Trazodone caused HA.  Currently on mirtazapine.  Untreated suresh +/- psychophysiologic.           Current Assessment & Plan     Will monitor with apap therapy.           SURESH (obstructive sleep apnea) - Primary (Chronic)    Overview     ahi of 25.  Non-compliant with therapy         Current Assessment & Plan     result of sleep study d/w patient.  Aware of need for treatment.  desensatization protocol d/w patient.  Will re-order apap.  Patient is aware of potential need for retitration.           Relevant Orders    CPAP FOR HOME USE          Diagnostic: Polysomnogram. The nature of this procedure and its indication was discussed with the patient.     Education: During our discussion today, we talked about the etiology of obstructive sleep apnea as well as the potential ramifications of untreated sleep apnea, which could include daytime sleepiness, hypertension, heart disease and/or stroke.     Precautions: The patient was advised to abstain from driving should they feel sleepy or drowsy.       Thank you for allowing me the opportunity to participate in the care of your patient.    Patient will Follow up in about 3 months (around 1/8/2020). with md/np.    Please cc note to  Uri Marrufo MD.    This is 45 minutes visit, over 50% of time spent in direct consultation with patient.

## 2019-10-08 NOTE — LETTER
October 8, 2019      Uri Marrufo MD  [address] [facility]  [place] [address]           [facility]  [address]  [place] [address]  Phone: [phone]  Fax: [phone]          Patient: Juan Neves   MR Number: [id number]   YOB: 1945   Date of Visit: 10/8/2019       Dear Dr. Uri Marrfuo:    Thank you for referring Juan Neves to me for evaluation. Attached you will find relevant portions of my assessment and plan of care.    If you have questions, please do not hesitate to call me. I look forward to following Juan Neves along with you.    Sincerely,    Yefri Lai MD    Enclosure  CC:  No Recipients    If you would like to receive this communication electronically, please contact [email or web address] or [phone] to request more information on appiris Link access.    For providers and/or their staff who would like to refer a patient to [facility], please contact us through our one-stop-shop provider referral line, [facility], at [phone].    If you feel you have received this communication in error or would no longer like to receive these types of communications, please e-mail [email or web address]

## 2019-10-09 NOTE — ASSESSMENT & PLAN NOTE
result of sleep study d/w patient.  Aware of need for treatment.  desensatization protocol d/w patient.  Will re-order apap.  Patient is aware of potential need for retitration.

## 2019-10-17 ENCOUNTER — PATIENT MESSAGE (OUTPATIENT)
Dept: PULMONOLOGY | Facility: CLINIC | Age: 74
End: 2019-10-17

## 2019-10-17 DIAGNOSIS — G47.33 OSA (OBSTRUCTIVE SLEEP APNEA): Primary | ICD-10-CM

## 2019-10-18 NOTE — TELEPHONE ENCOUNTER
requalification study is needed prior to cpap can be dispense.  Will set up home sleep testing.  Once suresh is reconfirmed, will set up apap.

## 2019-11-06 ENCOUNTER — HOSPITAL ENCOUNTER (OUTPATIENT)
Dept: SLEEP MEDICINE | Facility: HOSPITAL | Age: 74
Discharge: HOME | End: 2019-11-06
Attending: INTERNAL MEDICINE
Payer: MEDICARE

## 2019-11-06 DIAGNOSIS — G47.33 OSA (OBSTRUCTIVE SLEEP APNEA): ICD-10-CM

## 2019-11-06 PROCEDURE — 95800 SLP STDY UNATTENDED: CPT | Mod: HCNC

## 2019-11-06 NOTE — PROGRESS NOTES
The patient ID was verified. He  instructed on how to turn the Home Sleep Testing device on and off, how to apply the sensors. He  was encouraged to sleep on supine position and must have 6 hours of sleep. The patient was instructed not to get the device wet and return it to a  at the hospital. All questions were answered prior to patient leaving. He  was provided the  after visit summary.

## 2019-11-13 PROCEDURE — 95800 PR SLEEP STUDY, UNATTENDED, RECORD HEART RATE/O2 SAT/RESP ANAL/SLEEP TIME: ICD-10-PCS | Mod: 26,HCNC,, | Performed by: INTERNAL MEDICINE

## 2019-11-13 PROCEDURE — 95800 SLP STDY UNATTENDED: CPT | Mod: 26,HCNC,, | Performed by: INTERNAL MEDICINE

## 2019-11-14 ENCOUNTER — TELEPHONE (OUTPATIENT)
Dept: SLEEP MEDICINE | Facility: HOSPITAL | Age: 74
End: 2019-11-14

## 2019-11-14 DIAGNOSIS — G47.33 OSA (OBSTRUCTIVE SLEEP APNEA): Primary | ICD-10-CM

## 2019-11-14 NOTE — PROCEDURES
"Dear Provider,     You have ordered sleep LAB services to perform the sleep study for Juan Neves.  The sleep study that you ordered is complete.      Please find Sleep Study result in "Chart Review" under the "Media tab."      As the ordering provider, you are responsible for reviewing the results and implementing a treatment plan with your patient.    If you need a Sleep Medicine provider to explain the sleep study findings and arrange treatment for the patient, please refer patient for consultation to our Sleep Clinic via [facility] with Ambulatory Consult Sleep.    To do that please place an order for an  "Ambulatory Consult Sleep" - it will go to our clinic work queue for our Medical Assistant to contact the patient for an appointment.     For any questions, please contact our clinic staff at [phone] to talk to clinical staff.   "

## 2019-12-10 ENCOUNTER — PATIENT MESSAGE (OUTPATIENT)
Dept: PULMONOLOGY | Facility: CLINIC | Age: 74
End: 2019-12-10

## 2019-12-10 DIAGNOSIS — G47.33 OSA (OBSTRUCTIVE SLEEP APNEA): Primary | ICD-10-CM

## 2019-12-11 ENCOUNTER — PATIENT MESSAGE (OUTPATIENT)
Dept: PULMONOLOGY | Facility: CLINIC | Age: 74
End: 2019-12-11

## 2019-12-11 ENCOUNTER — TELEPHONE (OUTPATIENT)
Dept: PULMONOLOGY | Facility: CLINIC | Age: 74
End: 2019-12-11

## 2019-12-11 DIAGNOSIS — G47.33 OSA (OBSTRUCTIVE SLEEP APNEA): Primary | ICD-10-CM

## 2019-12-11 NOTE — TELEPHONE ENCOUNTER
----- Message from Chula Swan sent at 12/11/2019  8:38 AM CST -----  Regarding: Heated tubing  / nurse      Pt is requesting a heated tubing please correct the heated tubing code from ( standard tube )  To (  heated tube) on the Rx written on 12/10/19 so we can provide him with a heated tubing. If you have any questions please fill free to contact me.      Thank You   Chula [email or web address]  #   [phone]  Fax# [phone]

## 2020-01-08 ENCOUNTER — OFFICE VISIT (OUTPATIENT)
Dept: PULMONOLOGY | Facility: CLINIC | Age: 75
End: 2020-01-08
Payer: MEDICARE

## 2020-01-08 VITALS
SYSTOLIC BLOOD PRESSURE: 145 MMHG | HEART RATE: 60 BPM | BODY MASS INDEX: 30.43 KG/M2 | OXYGEN SATURATION: 98 % | HEIGHT: 74 IN | DIASTOLIC BLOOD PRESSURE: 81 MMHG | WEIGHT: 237.13 LBS

## 2020-01-08 DIAGNOSIS — G47.01 INSOMNIA DUE TO MEDICAL CONDITION: ICD-10-CM

## 2020-01-08 DIAGNOSIS — G47.33 OSA (OBSTRUCTIVE SLEEP APNEA): Chronic | ICD-10-CM

## 2020-01-08 PROCEDURE — 99214 OFFICE O/P EST MOD 30 MIN: CPT | Mod: HCNC,S$GLB,, | Performed by: INTERNAL MEDICINE

## 2020-01-08 PROCEDURE — 1101F PR PT FALLS ASSESS DOC 0-1 FALLS W/OUT INJ PAST YR: ICD-10-PCS | Mod: HCNC,CPTII,S$GLB, | Performed by: INTERNAL MEDICINE

## 2020-01-08 PROCEDURE — 1101F PT FALLS ASSESS-DOCD LE1/YR: CPT | Mod: HCNC,CPTII,S$GLB, | Performed by: INTERNAL MEDICINE

## 2020-01-08 PROCEDURE — 99999 PR PBB SHADOW E&M-EST. PATIENT-LVL III: CPT | Mod: PBBFAC,HCNC,, | Performed by: INTERNAL MEDICINE

## 2020-01-08 PROCEDURE — 1126F PR PAIN SEVERITY QUANTIFIED, NO PAIN PRESENT: ICD-10-PCS | Mod: HCNC,S$GLB,, | Performed by: INTERNAL MEDICINE

## 2020-01-08 PROCEDURE — 99999 PR PBB SHADOW E&M-EST. PATIENT-LVL III: ICD-10-PCS | Mod: PBBFAC,HCNC,, | Performed by: INTERNAL MEDICINE

## 2020-01-08 PROCEDURE — 1159F PR MEDICATION LIST DOCUMENTED IN MEDICAL RECORD: ICD-10-PCS | Mod: HCNC,S$GLB,, | Performed by: INTERNAL MEDICINE

## 2020-01-08 PROCEDURE — 1159F MED LIST DOCD IN RCRD: CPT | Mod: HCNC,S$GLB,, | Performed by: INTERNAL MEDICINE

## 2020-01-08 PROCEDURE — 99214 PR OFFICE/OUTPT VISIT, EST, LEVL IV, 30-39 MIN: ICD-10-PCS | Mod: HCNC,S$GLB,, | Performed by: INTERNAL MEDICINE

## 2020-01-08 PROCEDURE — 1126F AMNT PAIN NOTED NONE PRSNT: CPT | Mod: HCNC,S$GLB,, | Performed by: INTERNAL MEDICINE

## 2020-01-08 NOTE — PATIENT INSTRUCTIONS
Stimulus control  1. Go to bed only when sleepy AND after 12 am  2. Do not watch television, read, eat, or worry while in bed. Use bed only for sleep and sex.   3. Get out of bed if unable to fall asleep within twenty minutes and go to another room.  Do something different like reading a book.  Dont engage in stimulating activity.  Return to bed only when you are sleepy.  Repeat this step as many times as necessary throughout the night.   4. Set an alarm clock to wake up at a fixed time each morning including weekends.  Wake up at 6:00 AM.  5. Do not take a nap during the day.       Hawa Chavez, Ph.D.  Clinical Psychologist  [facility]  Department of Psychiatry, Section of Psychology  [address] [place] [id number]  [phone]

## 2020-01-08 NOTE — ASSESSMENT & PLAN NOTE
97%>4 hours.  Residual ahi of 8.  Tolerating apap well.  +dry mouth chronically.  Not worsen with apap.  Recommend chin strap.  Patient would like to try new mask.  Nasal pillow is too flimsy.  Will try nasal mask.

## 2020-01-08 NOTE — ASSESSMENT & PLAN NOTE
Stimulus control and sleep restriction d/w patient.  Sleep time from 12 am to 6:30 am.  Contact information for Dr. Chavez was provided for cbt I

## 2020-01-08 NOTE — PROGRESS NOTES
Juan Neves  was seen as a follow up    CHIEF COMPLAINT:    Chief Complaint   Patient presents with    Follow-up       HISTORY OF PRESENT ILLNESS: Juan Neves is a 74 y.o. male is here for sleep evaluation.   Patient was seen by Dr. Emerson in the past.  Last appointment was 3/16.  Patient was diagnosed with joe in 2015 with ahi of 25.  Patient was set up with cpap and subsequently switched to apap.  Patient with lots of dry mouth.  Patient was prescribed lunesta without much improvement.  Currently on mirtazapine with some improvement.  Still with early awakening.        Currently, patient with loud snoring.  +witnessed apnea.  Feeling tire upon awake 3-4 times per week.  No parasomnia.  No cataplexy.  No rls symptoms.      Carlisle Sleepiness Scale score during initial sleep evaluation was 4.    Patient under hst study 11/6/19 with ahi of 50.  Patient was set up with apap.  Patient has been using apap night.  Dry mouth not worsen with apap.  Sleep quality improved with apap.  Snoring improved.      SLEEP ROUTINE:  Activity the hour prior to sleep: read and watch tv    Bed partner:  wife  Time to bed:  12 am   Lights off:  off  Sleep onset latency:  20 minutes (5-10 minutes with mirtazapine)        Disruptions or awakenings:    1-2 times (no difficulty going back to sleep)    Wakeup time:      5:30 am; lay in bed until 6:30 am  Perceived sleep quality:  Tire on most days       Daytime naps:      Occasional 15-40 minutes  Weekend sleep routine:      same  Caffeine use: 2 cups of coffee in am   exercise habit:   Play pickleball 2-3 times per week.  Gym 3-4 times per week.    PAST MEDICAL HISTORY:    Active Ambulatory Problems     Diagnosis Date Noted    Insomnia due to medical condition 08/13/2012    Dupuytren's contracture of hand 11/30/2016    JOE (obstructive sleep apnea) 01/11/2017    Bilateral bunions 03/01/2018    Pure hypercholesterolemia 09/19/2018     Resolved Ambulatory Problems     Diagnosis  "Date Noted    BPPV (benign paroxysmal positional vertigo) 08/13/2012    Encounter for preventive health examination 04/18/2013    Cervical strain 08/19/2013    Anxiety disorder 08/19/2013    Atypical pneumonia 01/30/2014    Nasal obstruction 03/01/2016    Screening for colon cancer 01/23/2017     Past Medical History:   Diagnosis Date    Allergy     Hyperlipidemia     Insomnia 2012    Sleep apnea                 PAST SURGICAL HISTORY:    Past Surgical History:   Procedure Laterality Date    COLONOSCOPY      COLONOSCOPY N/A 1/23/2017    Procedure: COLONOSCOPY;  Surgeon: NASEEM Iqbal MD;  Location: [facility];  Service: Endoscopy;  Laterality: N/A;    EXCISION TURBINATE, SUBMUCOUS      NASAL RECONSTRUCTION      with bilateral  grafts    NASAL SEPTUM SURGERY           FAMILY HISTORY:                Family History   Problem Relation Age of Onset    Acne Daughter     Diabetes Mother     Cancer Father     Diabetes Sister     Cancer Sister     No Known Problems Daughter     Coronary artery disease Neg Hx     Heart disease Neg Hx     Melanoma Neg Hx     Psoriasis Neg Hx     Eczema Neg Hx     Lupus Neg Hx        SOCIAL HISTORY:          Tobacco:   Social History     Tobacco Use   Smoking Status Never Smoker   Smokeless Tobacco Never Used   Tobacco Comment    Pt smokes cigars periodically (6x's a year)       alcohol use:    Social History     Substance and Sexual Activity   Alcohol Use Yes    Comment:  ( 1 glass of wine and 1 scotch)                 Occupation:  Retire; former [employer]     ALLERGIES:    Review of patient's allergies indicates:   Allergen Reactions    Pcn [penicillins] Other (See Comments)     pt. reports having a "blackout"       CURRENT MEDICATIONS:    Current Outpatient Medications   Medication Sig Dispense Refill    atorvastatin (LIPITOR) 20 MG tablet Take 1 tablet (20 mg total) by mouth once daily. 90 tablet 3    fluticasone (FLONASE) 50 mcg/actuation nasal " "spray 1 spray by Each Nare route once daily. 3 Bottle 3    mirtazapine (REMERON) 15 MG tablet Take 1 tablet (15 mg total) by mouth every evening. 90 tablet 0     No current facility-administered medications for this visit.                   REVIEW OF SYSTEMS:     Sleep related symptoms as per HPI.  CONST:Denies weight gain    HEENT: Denies sinus congestion  PULM: Denies dyspnea  CARD:  Denies palpitations   GI:  + acid reflux  : Denies polyuria  NEURO: Denies headaches  PSYCH: Denies mood disturbance  HEME: Denies anemia   Otherwise, a balance of systems reviewed is negative.          PHYSICAL EXAM:  Vitals:    01/08/20 1002   BP: (!) 145/81   Pulse: 60   SpO2: 98%   Weight: 107.6 kg (237 lb 1.7 oz)   Height: 6' 2" (1.88 m)   PainSc: 0-No pain     Body mass index is 30.44 kg/m².     GENERAL: Normal development, well groomed  HEENT:  Conjunctivae are non-erythematous; Pupils equal, round, and reactive to light; Nose is symmetrical; Nasal mucosa is pink and moist; Septum is midline; Inferior turbinates are normal; Nasal airflow is normal; Posterior pharynx is pink; Modified Mallampati: 3; Posterior palate is normal; Tonsils +1; Uvula is normal and pink;Tongue is normal; Dentition is fair; No TMJ tenderness; Jaw opening and protrusion without click and without discomfort.  NECK: Supple. Neck circumference is 17 inches. No thyromegaly. No palpable nodes.     SKIN: On face and neck: No abrasions, no rashes, no lesions.  No subcutaneous nodules are palpable.  RESPIRATORY: Chest is clear to auscultation.  Normal chest expansion and non-labored breathing at rest.  CARDIOVASCULAR: Normal S1, S2.  No murmurs, gallops or rubs. No carotid bruits bilaterally.  EXTREMITIES: No edema. No clubbing. No cyanosis. Station normal. Gait normal.        NEURO/PSYCH: Oriented to time, place and person. Normal attention span and concentration. Affect is full. Mood is normal.                                              DATA   10/29/15 " ahi 25; cpap 9 cm H20  hsat ahi of 50; min sat of 77%    Lab Results   Component Value Date    TSH 3.130 03/15/2018     ASSESSMENT/PLAN  Problem List Items Addressed This Visit     Insomnia due to medical condition    Overview     Difficulty with sleep initiation and maintenance.  Ambien caused OBRIEN.  Rozorem didn't work.  Lunesta not covered.  Trazodone caused HA.  Currently on mirtazapine.           Current Assessment & Plan     Stimulus control and sleep restriction d/w patient.  Sleep time from 12 am to 6:30 am.  Contact information for Dr. Chavez was provided for cbt I          JOE (obstructive sleep apnea) (Chronic)    Overview     ahi of 25.  Repeat ahi of 55.  Currently on apap         Current Assessment & Plan     97%>4 hours.  Residual ahi of 8.  Tolerating apap well.  +dry mouth chronically.  Not worsen with apap.  Recommend chin strap.  Patient would like to try new mask.  Nasal pillow is too flimsy.  Will try nasal mask.           Relevant Orders    CPAP/BIPAP SUPPLIES            Education: During our discussion today, we talked about the etiology of obstructive sleep apnea as well as the potential ramifications of untreated sleep apnea, which could include daytime sleepiness, hypertension, heart disease and/or stroke.     Precautions: The patient was advised to abstain from driving should they feel sleepy or drowsy.       Patient will No follow-ups on file. with md/np.      This is 25 minutes visit, over 50% of time spent in direct consultation with patient.

## 2020-01-14 ENCOUNTER — PATIENT MESSAGE (OUTPATIENT)
Dept: PULMONOLOGY | Facility: CLINIC | Age: 75
End: 2020-01-14

## 2020-01-14 ENCOUNTER — TELEPHONE (OUTPATIENT)
Dept: PULMONOLOGY | Facility: CLINIC | Age: 75
End: 2020-01-14

## 2020-01-14 DIAGNOSIS — G47.33 OSA (OBSTRUCTIVE SLEEP APNEA): Primary | ICD-10-CM

## 2020-01-14 NOTE — TELEPHONE ENCOUNTER
----- Message from Oxana Gamez, RRT sent at 1/14/2020  2:40 PM CST -----  Regarding: Pressure Change   Patient feels he is not getting enough pressure when first putting to unit on. Increased pressure to [id number]. Patient is comfortable with starting at 7cmh20. Can we please get an updated Rx.

## 2020-02-12 ENCOUNTER — PATIENT MESSAGE (OUTPATIENT)
Dept: PULMONOLOGY | Facility: CLINIC | Age: 75
End: 2020-02-12

## 2020-02-14 ENCOUNTER — OFFICE VISIT (OUTPATIENT)
Dept: PULMONOLOGY | Facility: CLINIC | Age: 75
End: 2020-02-14
Payer: MEDICARE

## 2020-02-14 VITALS
BODY MASS INDEX: 29.06 KG/M2 | SYSTOLIC BLOOD PRESSURE: 120 MMHG | HEIGHT: 74 IN | WEIGHT: 226.44 LBS | OXYGEN SATURATION: 97 % | DIASTOLIC BLOOD PRESSURE: 63 MMHG | HEART RATE: 61 BPM

## 2020-02-14 DIAGNOSIS — G47.00 INSOMNIA, UNSPECIFIED TYPE: ICD-10-CM

## 2020-02-14 DIAGNOSIS — G47.33 OSA ON CPAP: Primary | ICD-10-CM

## 2020-02-14 PROCEDURE — 99999 PR PBB SHADOW E&M-EST. PATIENT-LVL III: CPT | Mod: PBBFAC,HCNC,, | Performed by: NURSE PRACTITIONER

## 2020-02-14 PROCEDURE — 99999 PR PBB SHADOW E&M-EST. PATIENT-LVL III: ICD-10-PCS | Mod: PBBFAC,HCNC,, | Performed by: NURSE PRACTITIONER

## 2020-02-14 PROCEDURE — 1159F MED LIST DOCD IN RCRD: CPT | Mod: HCNC,S$GLB,, | Performed by: NURSE PRACTITIONER

## 2020-02-14 PROCEDURE — 99213 OFFICE O/P EST LOW 20 MIN: CPT | Mod: HCNC,S$GLB,, | Performed by: NURSE PRACTITIONER

## 2020-02-14 PROCEDURE — 1126F AMNT PAIN NOTED NONE PRSNT: CPT | Mod: HCNC,S$GLB,, | Performed by: NURSE PRACTITIONER

## 2020-02-14 PROCEDURE — 1101F PT FALLS ASSESS-DOCD LE1/YR: CPT | Mod: HCNC,CPTII,S$GLB, | Performed by: NURSE PRACTITIONER

## 2020-02-14 PROCEDURE — 1126F PR PAIN SEVERITY QUANTIFIED, NO PAIN PRESENT: ICD-10-PCS | Mod: HCNC,S$GLB,, | Performed by: NURSE PRACTITIONER

## 2020-02-14 PROCEDURE — 1101F PR PT FALLS ASSESS DOC 0-1 FALLS W/OUT INJ PAST YR: ICD-10-PCS | Mod: HCNC,CPTII,S$GLB, | Performed by: NURSE PRACTITIONER

## 2020-02-14 PROCEDURE — 99213 PR OFFICE/OUTPT VISIT, EST, LEVL III, 20-29 MIN: ICD-10-PCS | Mod: HCNC,S$GLB,, | Performed by: NURSE PRACTITIONER

## 2020-02-14 PROCEDURE — 1159F PR MEDICATION LIST DOCUMENTED IN MEDICAL RECORD: ICD-10-PCS | Mod: HCNC,S$GLB,, | Performed by: NURSE PRACTITIONER

## 2020-02-14 NOTE — PROGRESS NOTES
Mr. Juan Neves is a 75 y.o. year-old male with JOE, insomnia and familial HLD returns for management of obstructive sleep apnea and CPAP equipment check. The patient has symptoms of snoring, witnessed apnea and fatigue     HST: 11/6/2019 AHI 50    CPAP set up Date :November 19, 2019 but was switched to new machine on 1/15/2020 APAP 5-15 due to machine malfunction.  Patient states he is using CPAP daily and feels more energized.    On today's [place] Sleepiness Scale the patient scores a 5.     Compliance Summary  11/16/2019 - 1/14/2020 (60 days) Days with Device Usage 57 days Days without Device Usage 3 days Percent Days with Device Usage 95.0% Cumulative Usage 14 days 14 hrs. 2 mins. 18 secs. Maximum Usage (1 Day) 8 hrs. 23 mins. 22 secs. Average Usage (All Days) 5 hrs. 50 mins. 2 secs. Average Usage (Days Used) 6 hrs. 8 mins. 27 secs. Minimum Usage (1 Day) 14 mins. 38 secs. Percent of Days with Usage >= 4 Hours 86.7% Percent of Days with Usage < 4 Hours 13.3% Date Range Total Blower Time 14 days 15 hrs. 12 mins. 9 secs.  Average AHI 9.2  Auto-CPAP Summary Auto-CPAP Mean Pressure 6.4 cmH2O Auto-CPAP Peak Average Pressure 10.9 cmH2O Average Device Pressure <= 90% of Time 8.2 cmH2O Average Time in Large Leak Per Day 12 mins. 39 secs.    Compliance Summary   1/19/2020 - 2/17/2020 (30 days) Days with Device Usage 29 days Days without Device Usage 1 day Percent Days with Device Usage 96.7% Cumulative Usage 7 days 8 hrs. 5 mins. 30 secs. Maximum Usage (1 Day) 7 hrs. 31 mins. 53 secs. Average Usage (All Days) 5 hrs. 52 mins. 11 secs. Average Usage (Days Used) 6 hrs. 4 mins. 19 secs. Minimum Usage (1 Day) 5 mins. 1 secs. Percent of Days with Usage >= 4 Hours 93.3% Percent of Days with Usage < 4 Hours 6.7% Date Range Total Blower Time 7 days 8 hrs. 5 mins. 35 secs.   Average AHI 9.9   Auto-CPAP Summary Auto-CPAP Mean Pressure 7.7 cmH2O Auto-CPAP Peak Average Pressure 9.5 cmH2O Average Device Pressure <= 90% of Time  9.0 cmH2O Average Time in Large Leak Per Day 1 hrs. 13 mins. 46 secs.        Past Medical History:    Past Medical History:   Diagnosis Date    Allergy     Hyperlipidemia     Insomnia 2012    Sleep apnea       Past Surgical History:   Past Surgical History:   Procedure Laterality Date    COLONOSCOPY      COLONOSCOPY N/A 1/23/2017    Procedure: COLONOSCOPY;  Surgeon: NASEEM Iqbal MD;  Location: [facility];  Service: Endoscopy;  Laterality: N/A;    EXCISION TURBINATE, SUBMUCOUS      NASAL RECONSTRUCTION      with bilateral  grafts    NASAL SEPTUM SURGERY        Family History:   Family History   Problem Relation Age of Onset    Acne Daughter     Diabetes Mother     Cancer Father     Diabetes Sister     Cancer Sister     No Known Problems Daughter     Coronary artery disease Neg Hx     Heart disease Neg Hx     Melanoma Neg Hx     Psoriasis Neg Hx     Eczema Neg Hx     Lupus Neg Hx       Social History:   Social History     Tobacco Use   Smoking Status Never Smoker   Smokeless Tobacco Never Used   Tobacco Comment    Pt smokes cigars periodically (6x's a year)      Social History     Substance and Sexual Activity   Alcohol Use Yes    Comment:  ( 1 glass of wine and 1 scotch)      Social History     Substance and Sexual Activity   Drug Use No        CURRENT MEDICATIONS:    Current Outpatient Medications   Medication Sig Dispense Refill    atorvastatin (LIPITOR) 20 MG tablet Take 1 tablet (20 mg total) by mouth once daily. 90 tablet 3    fluticasone (FLONASE) 50 mcg/actuation nasal spray 1 spray by Each Nare route once daily. 3 Bottle 3    mirtazapine (REMERON) 15 MG tablet Take 1 tablet (15 mg total) by mouth every evening. 90 tablet 0     No current facility-administered medications for this visit.                   Review of Symptoms  Review of Systems   Constitutional: Positive for fatigue. Negative for fever, chills, weight loss, weight gain, activity change, appetite change and  "night sweats.   HENT: Negative for congestion.    Eyes: Negative.    Respiratory: Negative for cough, sputum production, chest tightness, wheezing, orthopnea, previous hospitialization due to pulmonary problems, dyspnea on extertion and use of rescue inhaler.    Cardiovascular: Negative for chest pain, palpitations and leg swelling.   Genitourinary: Negative.    Endocrine: endocrine negative   Musculoskeletal: Negative for gait problem.   Skin: Negative.    Gastrointestinal: Negative for acid reflux.   Neurological: Negative.    Psychiatric/Behavioral: Positive for sleep disturbance.       Physical Exam  /63   Pulse 61   Ht 6' 2" (1.88 m)   Wt 102.7 kg (226 lb 6.6 oz)   SpO2 97%   BMI 29.07 kg/m²     Physical Exam   Constitutional: He is oriented to person, place, and time. He appears well-developed.   HENT:   Head: Normocephalic.   Nose: Nose normal.   Mouth/Throat: Oropharynx is clear and moist.   Neck: Neck supple.   Cardiovascular: Normal rate, regular rhythm and normal heart sounds.   Pulmonary/Chest: Normal expansion, effort normal and breath sounds normal.   Musculoskeletal: He exhibits no edema.   Neurological: He is alert and oriented to person, place, and time. Gait normal.   Skin: Skin is warm.   Psychiatric: He has a normal mood and affect. His behavior is normal. Judgment and thought content normal.       DATA:  Baseline Sleep Study: 11/6/2019 AHI 50    Assessment/Plan:     Problem List Items Addressed This Visit        Unprioritized    JOE on CPAP - Primary (Chronic)    Overview     HST 11/6/2019 AHI 50    Patient is using and benefiting from APAP 5-15 cwp . Residual predicted AHI within acceptable range. Review monitoring parameters with patient at visit.  on replacement schedule posted under patient welcome guide from .Patient reports improvement daytime fatigue and sleepiness on APAP         Insomnia    Overview     Difficulty with sleep initiation and maintenance.  " Ambien caused HA.  Rozorem didn't work.  Lunesta not covered.  Trazodone caused HA.  Currently on mirtazapine.               Follow up in about 1 year (around 2/14/2021), or if symptoms worsen or fail to improve, email updated numbers once pt obtain new mask.

## 2020-02-17 ENCOUNTER — PATIENT MESSAGE (OUTPATIENT)
Dept: PULMONOLOGY | Facility: CLINIC | Age: 75
End: 2020-02-17

## 2020-02-18 ENCOUNTER — TELEPHONE (OUTPATIENT)
Dept: PULMONOLOGY | Facility: CLINIC | Age: 75
End: 2020-02-18

## 2020-02-18 NOTE — TELEPHONE ENCOUNTER
Contacted patient. Informed patient he did not have an appointment today that needed cancelled. Patient reports he needs to schedule an appointment for change of  mask. Instructed patient to call DME to setup that appointment. Patient verbalized understanding and had no additional questions or concerns at this time.

## 2020-02-18 NOTE — TELEPHONE ENCOUNTER
----- Message from Hazel Nugent sent at 2/18/2020 10:20 AM CST -----  Contact: Self/  [phone]  Type: Patient Call Back    Who called:  Patient    What is the request in detail:  Patient stated he had an appointment today that needs to be rescheduled.  Can staff call him please.  I don't see an appointment for him.  Thank you    Would the patient rather a call back or a response via [facility]?   Call back    Best call back number:   [phone]

## 2020-05-27 DIAGNOSIS — E78.00 PURE HYPERCHOLESTEROLEMIA: ICD-10-CM

## 2020-05-27 RX ORDER — ATORVASTATIN CALCIUM 20 MG/1
TABLET, FILM COATED ORAL
Qty: 90 TABLET | Refills: 3 | Status: SHIPPED | OUTPATIENT
Start: 2020-05-27 | End: 2020-07-20 | Stop reason: SDUPTHER

## 2020-06-10 ENCOUNTER — PATIENT MESSAGE (OUTPATIENT)
Dept: FAMILY MEDICINE | Facility: CLINIC | Age: 75
End: 2020-06-10

## 2020-06-10 DIAGNOSIS — F51.01 PRIMARY INSOMNIA: Chronic | ICD-10-CM

## 2020-06-11 ENCOUNTER — PATIENT MESSAGE (OUTPATIENT)
Dept: PULMONOLOGY | Facility: CLINIC | Age: 75
End: 2020-06-11

## 2020-06-11 DIAGNOSIS — F51.01 PRIMARY INSOMNIA: Chronic | ICD-10-CM

## 2020-06-11 RX ORDER — MIRTAZAPINE 15 MG/1
15 TABLET, FILM COATED ORAL NIGHTLY
Qty: 90 TABLET | Refills: 1 | Status: SHIPPED | OUTPATIENT
Start: 2020-06-11 | End: 2021-06-11

## 2020-06-11 RX ORDER — MIRTAZAPINE 15 MG/1
15 TABLET, FILM COATED ORAL NIGHTLY
Qty: 90 TABLET | Refills: 1 | Status: SHIPPED | OUTPATIENT
Start: 2020-06-11 | End: 2020-06-11 | Stop reason: SDUPTHER

## 2020-06-24 ENCOUNTER — TELEPHONE (OUTPATIENT)
Dept: PSYCHIATRY | Facility: CLINIC | Age: 75
End: 2020-06-24

## 2020-06-24 NOTE — TELEPHONE ENCOUNTER
I called Mr. Neves and we discussed the CBT-I program.  He is leaving the first week of August for trips out of [facility], so we will do 5 sessions of the protocol instead of 6.

## 2020-06-29 ENCOUNTER — OFFICE VISIT (OUTPATIENT)
Dept: PSYCHIATRY | Facility: CLINIC | Age: 75
End: 2020-06-29
Payer: MEDICARE

## 2020-06-29 DIAGNOSIS — G47.33 OSA ON CPAP: Chronic | ICD-10-CM

## 2020-06-29 DIAGNOSIS — F51.01 PRIMARY INSOMNIA: Primary | ICD-10-CM

## 2020-06-29 PROCEDURE — 90834 PSYTX W PT 45 MINUTES: CPT | Mod: HCNC,95,, | Performed by: PSYCHOLOGIST

## 2020-06-29 PROCEDURE — 90834 PR PSYCHOTHERAPY W/PATIENT, 45 MIN: ICD-10-PCS | Mod: HCNC,95,, | Performed by: PSYCHOLOGIST

## 2020-06-29 NOTE — PROGRESS NOTES
DTC - Direct to [facility]  The patient location is: Home ([place])  The chief complaint leading to consultation is: Insomnia    Visit type: audiovisual    Face to Face time with patient: 45  50 minutes of total time spent on the encounter, which includes face to face time and non-face to face time preparing to see the patient (eg, review of tests), Obtaining and/or reviewing separately obtained history, Documenting clinical information in the electronic or other health record, Independently interpreting results (not separately reported) and communicating results to the patient/family/caregiver, or Care coordination (not separately reported).     Each patient to whom he or she provides medical services by telemedicine is:  (1) informed of the relationship between the physician and patient and the respective role of any other health care provider with respect to management of the patient; and (2) notified that he or she may decline to receive medical services by telemedicine and may withdraw from such care at any time.    Notes: N/A    Individual Psychotherapy (PhD/LCSW)    6/29/2020    Site:  [facility]         Therapeutic Intervention: Met with patient.  Outpatient - Insight oriented psychotherapy 45 min - CPT code 25835    Chief complaint/reason for encounter: sleep     Interval history and content of current session:  Mr. Juan Neves arrived on time for his scheduled appointment.  He reports he has experienced problems with sleep and insomnia for over 10 years.  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  It is difficult for him to stay asleep (once or twice per night), but he has no difficulty falling asleep.  He has been using a CPAP machine, but he stopped using it because it was affecting his sleep.  He takes OTC sleep medication occasionally.  He reports his sleep problems affect him during the day because he feels very tired.       Cognitive Behavioral Therapy for Insomnia (CBT-i), Session #1  Session Focus:  Welcome and member introductions  Introduction to CBT-i  Sleep and Insomnia Basic Concepts  Sleep medications  Sleep Diary & CBT-i      Practice Assignments:  1) Review treatment materials as needed.  2) Complete the Sleep Diary every day.  Fill it out within two hours of getting up.    Treatment plan:  · Target symptoms: insomnia  · Why chosen therapy is appropriate versus another modality: relevant to diagnosis, evidence based practice  · Outcome monitoring methods: self-report  · Therapeutic intervention type: insight oriented psychotherapy    Risk parameters:  Patient reports no suicidal ideation  Patient reports no homicidal ideation  Patient reports no self-injurious behavior  Patient reports no violent behavior    Verbal deficits: None    Patient's response to intervention:  The patient's response to intervention is accepting.    Progress toward goals and other mental status changes:  The patient's progress toward goals is good.    Diagnosis:     ICD-10-CM ICD-9-CM   1. Primary insomnia  F51.01 307.42   2. JOE on CPAP  G47.33 327.23    Z99.89 V46.8       Plan:  individual psychotherapy    Return to clinic: 1 week    Length of Service (minutes): 45

## 2020-07-02 ENCOUNTER — PES CALL (OUTPATIENT)
Dept: ADMINISTRATIVE | Facility: CLINIC | Age: 75
End: 2020-07-02

## 2020-07-06 ENCOUNTER — OFFICE VISIT (OUTPATIENT)
Dept: PSYCHIATRY | Facility: CLINIC | Age: 75
End: 2020-07-06
Payer: MEDICARE

## 2020-07-06 ENCOUNTER — PATIENT MESSAGE (OUTPATIENT)
Dept: ADMINISTRATIVE | Facility: HOSPITAL | Age: 75
End: 2020-07-06

## 2020-07-06 DIAGNOSIS — F51.01 PRIMARY INSOMNIA: Primary | ICD-10-CM

## 2020-07-06 DIAGNOSIS — G47.33 OSA ON CPAP: Chronic | ICD-10-CM

## 2020-07-06 PROCEDURE — 90834 PSYTX W PT 45 MINUTES: CPT | Mod: HCNC,95,, | Performed by: PSYCHOLOGIST

## 2020-07-06 PROCEDURE — 90834 PR PSYCHOTHERAPY W/PATIENT, 45 MIN: ICD-10-PCS | Mod: HCNC,95,, | Performed by: PSYCHOLOGIST

## 2020-07-06 NOTE — PROGRESS NOTES
DTC - Direct to [facility]  The patient location is: Home ([place])  The chief complaint leading to consultation is: Insomnia    Visit type: audiovisual    Face to Face time with patient: 45  50 minutes of total time spent on the encounter, which includes face to face time and non-face to face time preparing to see the patient (eg, review of tests), Obtaining and/or reviewing separately obtained history, Documenting clinical information in the electronic or other health record, Independently interpreting results (not separately reported) and communicating results to the patient/family/caregiver, or Care coordination (not separately reported).     Each patient to whom he or she provides medical services by telemedicine is:  (1) informed of the relationship between the physician and patient and the respective role of any other health care provider with respect to management of the patient; and (2) notified that he or she may decline to receive medical services by telemedicine and may withdraw from such care at any time.    Notes: N/A    Individual Psychotherapy (PhD/LCSW)    7/6/2020    Site:  [facility]         Therapeutic Intervention: Met with patient.  Outpatient - Insight oriented psychotherapy 45 min - CPT code 06900    Chief complaint/reason for encounter: sleep     Interval history and content of current session:  Mr. Juan Neves arrived on time for his scheduled appointment.      Cognitive Behavioral Therapy for Insomnia (CBT-i), Session #2  Sleep Diary completed?  Yes  # of days completed:  7    Session Focus:  Summarize and graph Sleep Diary  SE:  84%  TST:  6.3 hours  TIB:  7.5 hours  Introduce Stimulus Control and Sleep Hygiene  Introduce Sleep Restriction  Prescribed TTB:  12:00 AM  Prescribed TOB:  6:00 AM    Practice Assignments:  1) Review treatment materials as needed.  2) Complete the Sleep Diary every day.  Fill it out within two hours of getting up.  3) Implement Stimulus  Control and Sleep Hygiene strategies  4) Implement Sleep Restriction/Compression    Treatment plan:  · Target symptoms: insomnia  · Why chosen therapy is appropriate versus another modality: relevant to diagnosis, evidence based practice  · Outcome monitoring methods: self-report  · Therapeutic intervention type: insight oriented psychotherapy    Risk parameters:  Patient reports no suicidal ideation  Patient reports no homicidal ideation  Patient reports no self-injurious behavior  Patient reports no violent behavior    Verbal deficits: None    Patient's response to intervention:  The patient's response to intervention is accepting.    Progress toward goals and other mental status changes:  The patient's progress toward goals is good.    Diagnosis:     ICD-10-CM ICD-9-CM   1. Primary insomnia  F51.01 307.42   2. JOE on CPAP  G47.33 327.23    Z99.89 V46.8       Plan:  individual psychotherapy    Return to clinic: 1 week    Length of Service (minutes): 45

## 2020-07-10 ENCOUNTER — OFFICE VISIT (OUTPATIENT)
Dept: OPTOMETRY | Facility: CLINIC | Age: 75
End: 2020-07-10
Payer: MEDICARE

## 2020-07-10 DIAGNOSIS — H25.13 NUCLEAR SCLEROSIS OF BOTH EYES: Primary | ICD-10-CM

## 2020-07-10 DIAGNOSIS — Z46.0 FITTING AND ADJUSTMENT OF SPECTACLES AND CONTACT LENSES: Primary | ICD-10-CM

## 2020-07-10 PROCEDURE — 92014 PR EYE EXAM, EST PATIENT,COMPREHESV: ICD-10-PCS | Mod: HCNC,S$GLB,, | Performed by: OPTOMETRIST

## 2020-07-10 PROCEDURE — 99999 PR PBB SHADOW E&M-EST. PATIENT-LVL III: ICD-10-PCS | Mod: PBBFAC,HCNC,, | Performed by: OPTOMETRIST

## 2020-07-10 PROCEDURE — 99999 PR PBB SHADOW E&M-EST. PATIENT-LVL II: CPT | Mod: PBBFAC,HCNC,, | Performed by: OPTOMETRIST

## 2020-07-10 PROCEDURE — 92310 PR CONTACT LENS FITTING (NO CHANGE): ICD-10-PCS | Mod: CSM,,, | Performed by: OPTOMETRIST

## 2020-07-10 PROCEDURE — 92014 COMPRE OPH EXAM EST PT 1/>: CPT | Mod: HCNC,S$GLB,, | Performed by: OPTOMETRIST

## 2020-07-10 PROCEDURE — 99999 PR PBB SHADOW E&M-EST. PATIENT-LVL II: ICD-10-PCS | Mod: PBBFAC,HCNC,, | Performed by: OPTOMETRIST

## 2020-07-10 PROCEDURE — 99999 PR PBB SHADOW E&M-EST. PATIENT-LVL III: CPT | Mod: PBBFAC,HCNC,, | Performed by: OPTOMETRIST

## 2020-07-10 PROCEDURE — 92310 CONTACT LENS FITTING OU: CPT | Mod: CSM,,, | Performed by: OPTOMETRIST

## 2020-07-10 NOTE — PROGRESS NOTES
KARISSA LAZO 08/2018  Contacts comfortable and vision is good. Patient sleeps in   contacts for 2 or 3 days at a time and then leaves out overnight.  Glasses   about 2 yrs. Old and are progressives but patient doesn't like the   progressive.  Using AT's in the morning.  Patient wakes up with dry eyes.       Last edited by Brunilda Patel on 7/10/2020  2:20 PM. (History)            Assessment /Plan     For exam results, see Encounter Report.    Nuclear sclerosis of both eyes      1. Educated pt on presence of cataracts and effects on vision. No surgery at this time. Recheck in one year.

## 2020-07-13 ENCOUNTER — OFFICE VISIT (OUTPATIENT)
Dept: PSYCHIATRY | Facility: CLINIC | Age: 75
End: 2020-07-13
Payer: MEDICARE

## 2020-07-13 DIAGNOSIS — F51.01 PRIMARY INSOMNIA: ICD-10-CM

## 2020-07-13 DIAGNOSIS — G47.33 OSA ON CPAP: Primary | Chronic | ICD-10-CM

## 2020-07-13 PROCEDURE — 90834 PR PSYCHOTHERAPY W/PATIENT, 45 MIN: ICD-10-PCS | Mod: HCNC,95,, | Performed by: PSYCHOLOGIST

## 2020-07-13 PROCEDURE — 90834 PSYTX W PT 45 MINUTES: CPT | Mod: HCNC,95,, | Performed by: PSYCHOLOGIST

## 2020-07-13 NOTE — PROGRESS NOTES
"DTC - Direct to [facility]  The patient location is: Home ([place])  The chief complaint leading to consultation is: Insomnia    Visit type: audiovisual    Face to Face time with patient: 45  50 minutes of total time spent on the encounter, which includes face to face time and non-face to face time preparing to see the patient (eg, review of tests), Obtaining and/or reviewing separately obtained history, Documenting clinical information in the electronic or other health record, Independently interpreting results (not separately reported) and communicating results to the patient/family/caregiver, or Care coordination (not separately reported).     Each patient to whom he or she provides medical services by telemedicine is:  (1) informed of the relationship between the physician and patient and the respective role of any other health care provider with respect to management of the patient; and (2) notified that he or she may decline to receive medical services by telemedicine and may withdraw from such care at any time.    Notes: N/A    Individual Psychotherapy (PhD/LCSW)    7/13/2020    Site:  [facility]         Therapeutic Intervention: Met with patient.  Outpatient - Insight oriented psychotherapy 45 min - CPT code 81340    Chief complaint/reason for encounter: sleep     Interval history and content of current session:  Mr. Juan Neves arrived on time for his scheduled appointment.  He reports that his sleep quality has improved despite his sleep time went down.  He was "actually surprised that I could fall asleep and stay asleep."  He also found it interesting that he is waking up at the time of the alarm.  He does not find the 12:00 AM bedtime as a problem, but does feel both sleepy and tired during the day.      Cognitive Behavioral Therapy for Insomnia (CBT-i), Session #3  Sleep Diary completed?  Yes  # of days completed: 7  TIB:  5.2 hours  TST:  4.6 hours  SE:  88.5%    Session " Focus:  Summarize and graph Sleep Diary  Discuss Sleep Restriction/Compression  Prescribed TTB:  12:00 AM  Prescribed TOB:  6:00 AM  Review progress with Stimulus Control and Sleep Hygiene  Introduce Relaxation Training     Practice Assignments:  1) Review treatment materials as needed.  2) Complete the Sleep Diary every day.  Fill it out within two hours of getting up.  3) Implement Stimulus Control and Sleep Hygiene strategies  4) Implement Sleep Restriction/Compression  5) Practice Relaxation Training at least 20 minutes per day    Treatment plan:  · Target symptoms: insomnia  · Why chosen therapy is appropriate versus another modality: relevant to diagnosis, evidence based practice  · Outcome monitoring methods: self-report  · Therapeutic intervention type: insight oriented psychotherapy    Risk parameters:  Patient reports no suicidal ideation  Patient reports no homicidal ideation  Patient reports no self-injurious behavior  Patient reports no violent behavior    Verbal deficits: None    Patient's response to intervention:  The patient's response to intervention is accepting.    Progress toward goals and other mental status changes:  The patient's progress toward goals is good.    Diagnosis:     ICD-10-CM ICD-9-CM   1. JOE on CPAP  G47.33 327.23    Z99.89 V46.8   2. Primary insomnia  F51.01 307.42       Plan:  individual psychotherapy    Return to clinic: 1 week    Length of Service (minutes): 45

## 2020-07-20 ENCOUNTER — OFFICE VISIT (OUTPATIENT)
Dept: FAMILY MEDICINE | Facility: CLINIC | Age: 75
End: 2020-07-20
Payer: MEDICARE

## 2020-07-20 ENCOUNTER — HOSPITAL ENCOUNTER (OUTPATIENT)
Dept: RADIOLOGY | Facility: HOSPITAL | Age: 75
Discharge: HOME | End: 2020-07-20
Attending: INTERNAL MEDICINE
Payer: MEDICARE

## 2020-07-20 VITALS
SYSTOLIC BLOOD PRESSURE: 142 MMHG | OXYGEN SATURATION: 98 % | OXYGEN SATURATION: 98 % | BODY MASS INDEX: 26.72 KG/M2 | HEIGHT: 74 IN | WEIGHT: 208.13 LBS | TEMPERATURE: 97 F | HEART RATE: 61 BPM | HEART RATE: 67 BPM | DIASTOLIC BLOOD PRESSURE: 76 MMHG | SYSTOLIC BLOOD PRESSURE: 118 MMHG | BODY MASS INDEX: 26.71 KG/M2 | WEIGHT: 208.13 LBS | DIASTOLIC BLOOD PRESSURE: 90 MMHG | TEMPERATURE: 97 F

## 2020-07-20 DIAGNOSIS — D23.5 DYSPLASTIC NEVUS OF TRUNK: ICD-10-CM

## 2020-07-20 DIAGNOSIS — G89.29 CHRONIC LEFT SHOULDER PAIN: ICD-10-CM

## 2020-07-20 DIAGNOSIS — E78.00 PURE HYPERCHOLESTEROLEMIA: ICD-10-CM

## 2020-07-20 DIAGNOSIS — M25.512 CHRONIC LEFT SHOULDER PAIN: ICD-10-CM

## 2020-07-20 DIAGNOSIS — G47.33 OSA ON CPAP: Chronic | ICD-10-CM

## 2020-07-20 DIAGNOSIS — Z20.822 EXPOSURE TO COVID-19 VIRUS: ICD-10-CM

## 2020-07-20 DIAGNOSIS — F51.01 PRIMARY INSOMNIA: ICD-10-CM

## 2020-07-20 DIAGNOSIS — L30.9 DERMATITIS: ICD-10-CM

## 2020-07-20 DIAGNOSIS — E78.00 PURE HYPERCHOLESTEROLEMIA: Chronic | ICD-10-CM

## 2020-07-20 DIAGNOSIS — Z00.00 ROUTINE MEDICAL EXAM: Primary | ICD-10-CM

## 2020-07-20 DIAGNOSIS — R79.89 ABNORMAL CBC: ICD-10-CM

## 2020-07-20 DIAGNOSIS — Z00.00 ENCOUNTER FOR PREVENTIVE HEALTH EXAMINATION: Primary | ICD-10-CM

## 2020-07-20 PROCEDURE — 73030 XR SHOULDER COMPLETE 2 OR MORE VIEWS LEFT: ICD-10-PCS | Mod: 26,HCNC,LT, | Performed by: RADIOLOGY

## 2020-07-20 PROCEDURE — G0439 PR MEDICARE ANNUAL WELLNESS SUBSEQUENT VISIT: ICD-10-PCS | Mod: HCNC,S$GLB,, | Performed by: NURSE PRACTITIONER

## 2020-07-20 PROCEDURE — G0439 PPPS, SUBSEQ VISIT: HCPCS | Mod: HCNC,S$GLB,, | Performed by: NURSE PRACTITIONER

## 2020-07-20 PROCEDURE — 99397 PER PM REEVAL EST PAT 65+ YR: CPT | Mod: HCNC,S$GLB,, | Performed by: INTERNAL MEDICINE

## 2020-07-20 PROCEDURE — 99397 PR PREVENTIVE VISIT,EST,65 & OVER: ICD-10-PCS | Mod: HCNC,S$GLB,, | Performed by: INTERNAL MEDICINE

## 2020-07-20 PROCEDURE — 99999 PR PBB SHADOW E&M-EST. PATIENT-LVL IV: ICD-10-PCS | Mod: PBBFAC,HCNC,, | Performed by: INTERNAL MEDICINE

## 2020-07-20 PROCEDURE — 99999 PR PBB SHADOW E&M-EST. PATIENT-LVL IV: CPT | Mod: PBBFAC,HCNC,, | Performed by: INTERNAL MEDICINE

## 2020-07-20 PROCEDURE — 99999 PR PBB SHADOW E&M-EST. PATIENT-LVL IV: ICD-10-PCS | Mod: PBBFAC,HCNC,, | Performed by: NURSE PRACTITIONER

## 2020-07-20 PROCEDURE — 73030 X-RAY EXAM OF SHOULDER: CPT | Mod: 26,HCNC,LT, | Performed by: RADIOLOGY

## 2020-07-20 PROCEDURE — 73030 X-RAY EXAM OF SHOULDER: CPT | Mod: TC,HCNC,FY,PO,LT

## 2020-07-20 PROCEDURE — 99999 PR PBB SHADOW E&M-EST. PATIENT-LVL IV: CPT | Mod: PBBFAC,HCNC,, | Performed by: NURSE PRACTITIONER

## 2020-07-20 RX ORDER — TRIAMCINOLONE ACETONIDE 5 MG/G
CREAM TOPICAL 2 TIMES DAILY PRN
Qty: 30 G | Refills: 1 | Status: SHIPPED | OUTPATIENT
Start: 2020-07-20 | End: 2021-12-07

## 2020-07-20 RX ORDER — ATORVASTATIN CALCIUM 20 MG/1
20 TABLET, FILM COATED ORAL DAILY
Qty: 90 TABLET | Refills: 3 | Status: SHIPPED | OUTPATIENT
Start: 2020-07-20 | End: 2021-06-14

## 2020-07-20 NOTE — PATIENT INSTRUCTIONS
You may take over the counter ibuprofen 400mg (2 tabs) to 800mg (4 tabs) up to every 6 hours as needed for pain/inflammation OR naproxen 220mg (1 tab) to 440mg (2 tabs) every 12 hours as needed for pain/inflammation.  If you are taking the higher dosages, take with some food.  Also, avoid taking the higher dosage of ibuprofren every 6 or naproxen every 12 hours for more than 48-72 hours.  If you start to have abdominal pain, dark stools, or vomiting after doses, stop taking this medication immediately and contact a physician.  Do not take any other NSAIDs (non-steroidal anti-inflammatory drugs) in addition to your ibuprofen.  If you are unsure if a medication is an NSAID you may ask your pharmacist or call the office.

## 2020-07-20 NOTE — PROGRESS NOTES
I offered to discuss end of life issues, including information on how to make advance directives that the patient could use to name someone who would make medical decisions on their behalf if they became too ill to make themselves.    ___Patient declined  _X_Patient is interested, I provided paper work and offered to discuss.    Juan Neves presented for a  Medicare AWV and comprehensive Health Risk Assessment today. The following components were reviewed and updated:    · Medical history  · Family History  · Social history  · Allergies and Current Medications  · Health Risk Assessment  · Health Maintenance  · Care Team     ** See Completed Assessments for Annual Wellness Visit within the encounter summary.**         The following assessments were completed:  · Living Situation  · CAGE  · Depression Screening  · Timed Get Up and Go  · Whisper Test  · Cognitive Function Screening  ·   ·   · Nutrition Screening  · ADL Screening  · PAQ Screening        Vitals:    07/20/20 1320   BP: (!) 142/90   BP Location: Left arm   Patient Position: Sitting   BP Method: Medium (Manual)   Pulse: 67   Temp: 97 °F (36.1 °C)   TempSrc: Oral   SpO2: 98%   Weight: 94.4 kg (208 lb 1.8 oz)     Body mass index is 26.72 kg/m².  Physical Exam  Cardiovascular:      Rate and Rhythm: Normal rate.   Pulmonary:      Effort: No respiratory distress.   Musculoskeletal: Normal range of motion.   Neurological:      Mental Status: He is alert.   Psychiatric:         Mood and Affect: Mood normal.         Thought Content: Thought content normal.               Diagnoses and health risks identified today and associated recommendations/orders:    1. Encounter for preventive health examination  Education provided about preventive health examinations and procedures; addressed and discussed patient's health concerns. Additionally, reviewed medical record for risk factors and documented the results during this encounter.    2. JOE on CPAP  Stable,  patient evaluated/monitored by [facility]'s pulmonology/sleep medicine dept; continue as advised.     3. Primary insomnia  Stable, patient evaluated/monitored by [facility]'s psychiatry and sleep medicine departments.  Continue as advised.     4. Pure hypercholesterolemia  Presently at goal. Continue as advised regarding dietary and lifestyle modifications.     Reviewed health maintenance, educated about recommended examinations, procedures (labs & images), and immunizations.    Provided Juan with a 5-10 year written screening schedule and personal prevention plan. Recommendations were developed using the USPSTF age appropriate recommendations. Education, counseling, and referrals were provided as needed. After Visit Summary printed and given to patient which includes a list of additional screenings\tests needed.    Follow up in about 1 year (around 7/20/2021) for assessment .    Jacinto Love Jr, NP

## 2020-07-20 NOTE — PROGRESS NOTES
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  Please continue your current medications and doses.  If we cannot get control of this then an Orthopedic referral would be warranted.     Sincerely,  Uri Marrufo  [email or web address]

## 2020-07-20 NOTE — PROGRESS NOTES
Assessment & Plan  Problem List Items Addressed This Visit        Cardiac/Vascular    Pure hypercholesterolemia (Chronic)    Overview     The 10-year ASCVD risk score (Mary OWENS Jr., et al., 2013) is: 19.3% at start of therapy         Current Assessment & Plan     The current medical regimen is effective;  continue present plan and medications.          Relevant Medications    atorvastatin (LIPITOR) 20 MG tablet    Other Relevant Orders    Comprehensive metabolic panel    Lipid Panel    Comprehensive metabolic panel    Lipid Panel      Other Visit Diagnoses     Routine medical exam    -  Primary  -    Discussed healthy diet, regular exercise, necessary labs, age appropriate cancer screening, and routine vaccinations.       Dermatitis    -  Topical steroid PRN.     Relevant Medications    triamcinolone acetonide 0.5% (KENALOG) 0.5 % Crea    Exposure to Covid-19 Virus    -  Check with next set of labs    Relevant Orders    COVID-19 (SARS CoV-2) IgG Antibody    Abnormal CBC    -  Recheck labs    Relevant Orders    CBC auto differential    CBC auto differential    Chronic left shoulder pain    -  Check plain film given AC joint pain.  Some concern for rotator cuff partial tear as well.  NSAIDs, Ice.  Willr refer to Ortho if no improvement.     Relevant Orders    X-Ray Shoulder 2 or More Views Left    Dysplastic nevus of trunk    -  Near tip of left scapula.  Refer to derm given halo developing.     Relevant Orders    Ambulatory referral/consult to Dermatology            Health Maintenance reviewed, up to date.    Follow-up: Follow up in about 1 year (around 7/20/2021) for Routine Physical.    ______________________________________________________________________    Chief Complaint  Chief Complaint   Patient presents with    Annual Exam       HPI  Juan Neves is a 75 y.o. male with multiple medical diagnoses as listed in the medical history and problem list that presents for routine physical.  Pt is known to me with  his last appointment 7/20/2020.  We had to reschedule him from April due to the pandemic.  He is due for labs.    He has been having some left shoulder pain that is intermittent for the last year.  No clear inciting event.  Hurst with nearly all ROM activities.  He is RHD.  No weakness or numbness.  No neck pain.      He has some dry itchy skin on the upper left thigh intermittently.  Topical steroids working, needs refill.     He has a mole on his back that he would like me to look at.  Wife saw it.       PAST MEDICAL HISTORY:  Past Medical History:   Diagnosis Date    Allergy     Cataract     Hyperlipidemia     Insomnia 2012    Sleep apnea        PAST SURGICAL HISTORY:  Past Surgical History:   Procedure Laterality Date    COLONOSCOPY      COLONOSCOPY N/A 1/23/2017    Procedure: COLONOSCOPY;  Surgeon: NASEEM Iqbal MD;  Location: [facility];  Service: Endoscopy;  Laterality: N/A;    EXCISION TURBINATE, SUBMUCOUS      NASAL RECONSTRUCTION      with bilateral  grafts    NASAL SEPTUM SURGERY         SOCIAL HISTORY:  Social History     Socioeconomic History    Marital status:      Spouse name: Not on file    Number of children: Not on file    Years of education: Not on file    Highest education level: Not on file   Occupational History    Occupation: Retired     Employer: [employer]   Social Needs    Financial resource strain: Not on file    Food insecurity     Worry: Not on file     Inability: Not on file    Transportation needs     Medical: Not on file     Non-medical: Not on file   Tobacco Use    Smoking status: Never Smoker    Smokeless tobacco: Never Used    Tobacco comment: Pt smokes cigars periodically (6x's a year)   Substance and Sexual Activity    Alcohol use: Yes     Frequency: 4 or more times a week     Drinks per session: 1 or 2     Binge frequency: Never     Comment:  ( 1 glass of wine and 1 scotch)    Drug use: No    Sexual activity: Yes     Partners: Female  "  Lifestyle    Physical activity     Days per week: 5 days     Minutes per session: 50 min    Stress: Not at all   Relationships    Social connections     Talks on phone: Not on file     Gets together: Not on file     Attends Confucianist service: Not on file     Active member of club or organization: Not on file     Attends meetings of clubs or organizations: Not on file     Relationship status: Not on file   Other Topics Concern    Not on file   Social History Narrative    He is retired from the oil field. Lives with his wife in [place]. He is from [place]. Non-smoker. He drinks a lot of wine. He has 2 adult daughters. He enjoys gardening and golf. He was in the [employer]. 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                    FAMILY HISTORY:  Family History   Problem Relation Age of Onset    Acne Daughter     Diabetes Mother     Cancer Father     Diabetes Sister     Cancer Sister     No Known Problems Daughter     Coronary artery disease Neg Hx     Heart disease Neg Hx     Melanoma Neg Hx     Psoriasis Neg Hx     Eczema Neg Hx     Lupus Neg Hx        ALLERGIES AND MEDICATIONS: updated and reviewed.  Review of patient's allergies indicates:   Allergen Reactions    Pcn [penicillins] Other (See Comments)     pt. reports having a "blackout"     Current Outpatient Medications   Medication Sig Dispense Refill    atorvastatin (LIPITOR) 20 MG tablet Take 1 tablet (20 mg total) by mouth once daily. 90 tablet 3    fluticasone (FLONASE) 50 mcg/actuation nasal spray 1 spray by Each Nare route once daily. 3 Bottle 3    mirtazapine (REMERON) 15 MG tablet Take 1 tablet (15 mg total) by mouth every evening. (Patient not taking: Reported on 7/20/2020) 90 tablet 1    MIRTAZAPINE ORAL       triamcinolone acetonide 0.5% (KENALOG) 0.5 % Crea Apply topically 2 (two) times daily as needed. Apply to affected area.  Do not use on face, armpits, or groin 30 g 1     No current facility-administered medications for this " "visit.          ROS  Review of Systems   Constitutional: Negative for activity change and unexpected weight change.   HENT: Negative for hearing loss, rhinorrhea and trouble swallowing.    Eyes: Negative for discharge and visual disturbance.   Respiratory: Negative for chest tightness and wheezing.    Cardiovascular: Negative for chest pain and palpitations.   Gastrointestinal: Negative for blood in stool, constipation, diarrhea and vomiting.   Endocrine: Negative for polydipsia and polyuria.   Genitourinary: Negative for difficulty urinating, hematuria and urgency.   Musculoskeletal: Positive for arthralgias. Negative for joint swelling and neck pain.   Neurological: Negative for weakness and headaches.   Psychiatric/Behavioral: Negative for confusion and dysphoric mood.           Physical Exam  Vitals:    07/20/20 1348   BP: 118/76   BP Location: Left arm   Patient Position: Sitting   BP Method: Medium (Manual)   Pulse: 61   Temp: 97 °F (36.1 °C)   TempSrc: Temporal   SpO2: 98%   Weight: 94.4 kg (208 lb 1.8 oz)   Height: 6' 2" (1.88 m)    Body mass index is 26.72 kg/m².  Weight: 94.4 kg (208 lb 1.8 oz)   Height: 6' 2" (188 cm)   Physical Exam  Constitutional:       General: He is not in acute distress.     Appearance: He is well-developed.   HENT:      Head: Normocephalic and atraumatic.   Eyes:      General: Lids are normal. No scleral icterus.     Conjunctiva/sclera: Conjunctivae normal.      Pupils: Pupils are equal, round, and reactive to light.   Neck:      Musculoskeletal: Full passive range of motion without pain and neck supple.      Thyroid: No thyromegaly.      Vascular: No carotid bruit or JVD.   Cardiovascular:      Rate and Rhythm: Normal rate and regular rhythm.      Heart sounds: Normal heart sounds. No murmur. No friction rub. No S3 or S4 sounds.    Pulmonary:      Effort: Pulmonary effort is normal.      Breath sounds: Normal breath sounds. No wheezing, rhonchi or rales.   Abdominal:      General: " Bowel sounds are normal. There is no distension.      Palpations: Abdomen is soft.      Tenderness: There is no abdominal tenderness.   Musculoskeletal:         General: Tenderness present.      Right shoulder: Normal.      Left shoulder: He exhibits bony tenderness. He exhibits no tenderness, no swelling, no effusion and no crepitus.      Right lower leg: No edema.      Left lower leg: No edema.      Comments: Left shoulder:  + AC TTP.  no warmth, erythema, or TTP of clavicle, lateral deltoid.  No TTP of biceps tendon.  FROM with pain on flexion, ABduction.  Negative radial arc and drop arm.  + pain with cross-body AC joint loading.  Pain with returning arm to neutral position from AC loading.    Skin:     General: Skin is warm and dry.      Findings: No rash.   Neurological:      Mental Status: He is alert and oriented to person, place, and time.   Psychiatric:         Speech: Speech normal.         Behavior: Behavior normal.         Thought Content: Thought content normal.           Health Maintenance       Date Due Completion Date    Influenza Vaccine (1) 09/01/2020 10/12/2019    Lipid Panel 03/26/2024 3/26/2019    TETANUS VACCINE 08/04/2026 8/4/2016    Colorectal Cancer Screening 01/23/2027 1/23/2017

## 2020-07-21 ENCOUNTER — OFFICE VISIT (OUTPATIENT)
Dept: PSYCHIATRY | Facility: CLINIC | Age: 75
End: 2020-07-21
Payer: MEDICARE

## 2020-07-21 ENCOUNTER — TELEPHONE (OUTPATIENT)
Dept: FAMILY MEDICINE | Facility: CLINIC | Age: 75
End: 2020-07-21

## 2020-07-21 DIAGNOSIS — F51.01 PRIMARY INSOMNIA: ICD-10-CM

## 2020-07-21 DIAGNOSIS — G47.33 OSA ON CPAP: Primary | Chronic | ICD-10-CM

## 2020-07-21 PROCEDURE — 90834 PSYTX W PT 45 MINUTES: CPT | Mod: HCNC,95,, | Performed by: PSYCHOLOGIST

## 2020-07-21 PROCEDURE — 90834 PR PSYCHOTHERAPY W/PATIENT, 45 MIN: ICD-10-PCS | Mod: HCNC,95,, | Performed by: PSYCHOLOGIST

## 2020-07-21 NOTE — PATIENT INSTRUCTIONS
Counseling and Referral of Other Preventative  (Italic type indicates deductible and co-insurance are waived)    Patient Name: Juan Neves  Today's Date: 7/21/2020    Health Maintenance       Date Due Completion Date    Influenza Vaccine (1) 09/01/2020 10/12/2019    Lipid Panel 07/20/2025 7/20/2020    TETANUS VACCINE 08/04/2026 8/4/2016    Colorectal Cancer Screening 01/23/2027 1/23/2017        No orders of the defined types were placed in this encounter.    The following information is provided to all patients.  This information is to help you find resources for any of the problems found today that may be affecting your health:                Living healthy guide: www.[facility].[place].gov      Understanding Diabetes: www.diabetes.org      Eating healthy: www.cdc.gov/healthyweight      CDC home safety checklist: www.cdc.gov/steadi/patient.html      Agency on Aging: www.goea.[place].[facility]      Alcoholics anonymous (AA): www.aa.org      Physical Activity: www.nathaly.nih.gov/sa2socy      Tobacco use: www.quitwithusla.org

## 2020-07-21 NOTE — PROGRESS NOTES
"DTC - Direct to [employer] Telehealth  The patient location is: Home ([place])  The chief complaint leading to consultation is: Insomnia    Visit type: audiovisual    Face to Face time with patient: 45  50 minutes of total time spent on the encounter, which includes face to face time and non-face to face time preparing to see the patient (eg, review of tests), Obtaining and/or reviewing separately obtained history, Documenting clinical information in the electronic or other health record, Independently interpreting results (not separately reported) and communicating results to the patient/family/caregiver, or Care coordination (not separately reported).     Each patient to whom he or she provides medical services by telemedicine is:  (1) informed of the relationship between the physician and patient and the respective role of any other health care provider with respect to management of the patient; and (2) notified that he or she may decline to receive medical services by telemedicine and may withdraw from such care at any time.    Notes: N/A    Individual Psychotherapy (PhD/LCSW)    7/21/2020    Site:  [facility]         Therapeutic Intervention: Met with patient.  Outpatient - Insight oriented psychotherapy 45 min - CPT code 13499    Chief complaint/reason for encounter: sleep     Interval history and content of current session:  Mr. Juan Neves arrived on time for his scheduled appointment.  He rated the quality of his sleep as "better."  He had poorer sleep the past few nights because he had to sleep on the couch since his air conditioner is broken.  He has been sticking to sleep restriction, stimulus control, and relaxation.  He has occasionally napped once or twice when needed.  He is not as concerned about sleep as he was previously.    Cognitive Behavioral Therapy for Insomnia (CBT-i), Session #4  Sleep Diary completed?  Yes  # of days completed:  7  SE:  88.1%  TST:  5.2 hours  TIB:  5.9 " hours    Session Focus:  Summarize and graph Sleep Diary  Discuss Sleep Restriction/Compression  Prescribed TTB:  12:00 AM  Prescribed TOB:  6:30 AM  Review progress with Stimulus Control and Sleep Hygiene  Review progress with Relaxation Training  Review Cognitive Restructuring     Practice Assignment:  1) Review treatment materials as needed.  2) Complete the Sleep Diary every day.  Fill it out within two hours of getting up.  3) Implement Stimulus Control and Sleep Hygiene strategies  4) Implement Sleep Restriction/Compression  5) Practice Relaxation Training at least 20 minutes per day  6) Practice Cognitive Restructuring      Treatment plan:  · Target symptoms: insomnia  · Why chosen therapy is appropriate versus another modality: relevant to diagnosis, evidence based practice  · Outcome monitoring methods: self-report  · Therapeutic intervention type: insight oriented psychotherapy    Risk parameters:  Patient reports no suicidal ideation  Patient reports no homicidal ideation  Patient reports no self-injurious behavior  Patient reports no violent behavior    Verbal deficits: None    Patient's response to intervention:  The patient's response to intervention is accepting.    Progress toward goals and other mental status changes:  The patient's progress toward goals is good.    Diagnosis:     ICD-10-CM ICD-9-CM   1. JOE on CPAP  G47.33 327.23    Z99.89 V46.8   2. Primary insomnia  F51.01 307.42       Plan:  individual psychotherapy    Return to clinic: 1 week    Length of Service (minutes): 45

## 2020-07-29 ENCOUNTER — OFFICE VISIT (OUTPATIENT)
Dept: PSYCHIATRY | Facility: CLINIC | Age: 75
End: 2020-07-29
Payer: MEDICARE

## 2020-07-29 DIAGNOSIS — G47.33 OSA ON CPAP: Primary | Chronic | ICD-10-CM

## 2020-07-29 DIAGNOSIS — F51.01 PRIMARY INSOMNIA: ICD-10-CM

## 2020-07-29 PROCEDURE — 90834 PR PSYCHOTHERAPY W/PATIENT, 45 MIN: ICD-10-PCS | Mod: HCNC,95,, | Performed by: PSYCHOLOGIST

## 2020-07-29 PROCEDURE — 90834 PSYTX W PT 45 MINUTES: CPT | Mod: HCNC,95,, | Performed by: PSYCHOLOGIST

## 2020-07-29 NOTE — PROGRESS NOTES
"DTC - Direct to [employer] Telehealth  The patient location is: Home ([place])  The chief complaint leading to consultation is: Insomnia    Visit type: audiovisual    Face to Face time with patient: 45  50 minutes of total time spent on the encounter, which includes face to face time and non-face to face time preparing to see the patient (eg, review of tests), Obtaining and/or reviewing separately obtained history, Documenting clinical information in the electronic or other health record, Independently interpreting results (not separately reported) and communicating results to the patient/family/caregiver, or Care coordination (not separately reported).     Each patient to whom he or she provides medical services by telemedicine is:  (1) informed of the relationship between the physician and patient and the respective role of any other health care provider with respect to management of the patient; and (2) notified that he or she may decline to receive medical services by telemedicine and may withdraw from such care at any time.    Notes: N/A    Individual Psychotherapy (PhD/LCSW)    7/29/2020    Site:  [facility]         Therapeutic Intervention: Met with patient.  Outpatient - Insight oriented psychotherapy 45 min - CPT code 60814    Chief complaint/reason for encounter: sleep     Interval history and content of current session:  Mr. Juan Neves arrived on time for his scheduled appointment.  He reports his sleep has been "pretty good," and he is feeling "very confident" about his sleep.  He has not been using sleep medications and is not having any difficulty falling or staying asleep.    Cognitive Behavioral Therapy for Insomnia (CBT-i), Session #5  Sleep Diary completed?  Yes  # of days completed:  7  TST:  6.1 hours  TIB:  6.5 hours  SE:  93.8%    Session Focus:  Summarize and graph Sleep Diary  Discuss Sleep Restriction/Compression  Prescribed TTB:  11:30 PM  Prescribed TOB:  6:30 AM  Review " progress with Stimulus Control and Sleep Hygiene  Review progress with Relaxation Training  Review Cognitive Restructuring     Practice Assignment:  1) Review treatment materials as needed.  2) Complete the Sleep Diary every day.  Fill it out within two hours of getting up.  3) Implement Stimulus Control and Sleep Hygiene strategies  4) Implement Sleep Restriction/Compression  5) Practice Relaxation Training at least 20 minutes per day  6) Practice Cognitive Restructuring      Treatment plan:  · Target symptoms: insomnia  · Why chosen therapy is appropriate versus another modality: relevant to diagnosis, evidence based practice  · Outcome monitoring methods: self-report  · Therapeutic intervention type: insight oriented psychotherapy    Risk parameters:  Patient reports no suicidal ideation  Patient reports no homicidal ideation  Patient reports no self-injurious behavior  Patient reports no violent behavior    Verbal deficits: None    Patient's response to intervention:  The patient's response to intervention is accepting.    Progress toward goals and other mental status changes:  The patient's progress toward goals is good.    Diagnosis:     ICD-10-CM ICD-9-CM   1. JOE on CPAP  G47.33 327.23    Z99.89 V46.8   2. Primary insomnia  F51.01 307.42       Plan:  individual psychotherapy    Return to clinic: 1 week    Length of Service (minutes): 45

## 2020-08-24 ENCOUNTER — OFFICE VISIT (OUTPATIENT)
Dept: DERMATOLOGY | Facility: CLINIC | Age: 75
End: 2020-08-24
Payer: MEDICARE

## 2020-08-24 VITALS — BODY MASS INDEX: 26.71 KG/M2 | WEIGHT: 208 LBS

## 2020-08-24 DIAGNOSIS — D48.5 NEOPLASM OF UNCERTAIN BEHAVIOR OF SKIN: Primary | ICD-10-CM

## 2020-08-24 DIAGNOSIS — L29.9 BRACHIORADIAL PRURITUS: ICD-10-CM

## 2020-08-24 DIAGNOSIS — R20.2 NOTALGIA PARESTHETICA: ICD-10-CM

## 2020-08-24 DIAGNOSIS — D23.5 DYSPLASTIC NEVUS OF TRUNK: ICD-10-CM

## 2020-08-24 PROCEDURE — 88305 TISSUE EXAM BY PATHOLOGIST: CPT | Mod: 26,HCNC,, | Performed by: DERMATOLOGY

## 2020-08-24 PROCEDURE — 3008F PR BODY MASS INDEX (BMI) DOCUMENTED: ICD-10-PCS | Mod: HCNC,CPTII,S$GLB, | Performed by: DERMATOLOGY

## 2020-08-24 PROCEDURE — 1101F PR PT FALLS ASSESS DOC 0-1 FALLS W/OUT INJ PAST YR: ICD-10-PCS | Mod: HCNC,CPTII,S$GLB, | Performed by: DERMATOLOGY

## 2020-08-24 PROCEDURE — 88305 TISSUE EXAM BY PATHOLOGIST: ICD-10-PCS | Mod: 26,HCNC,, | Performed by: DERMATOLOGY

## 2020-08-24 PROCEDURE — 99999 PR PBB SHADOW E&M-EST. PATIENT-LVL III: CPT | Mod: PBBFAC,HCNC,, | Performed by: DERMATOLOGY

## 2020-08-24 PROCEDURE — 11300 PR SHAV SKIN LES < 0.5 CM TRUNK,ARM,LEG: ICD-10-PCS | Mod: HCNC,S$GLB,, | Performed by: DERMATOLOGY

## 2020-08-24 PROCEDURE — 1126F PR PAIN SEVERITY QUANTIFIED, NO PAIN PRESENT: ICD-10-PCS | Mod: HCNC,S$GLB,, | Performed by: DERMATOLOGY

## 2020-08-24 PROCEDURE — 3008F BODY MASS INDEX DOCD: CPT | Mod: HCNC,CPTII,S$GLB, | Performed by: DERMATOLOGY

## 2020-08-24 PROCEDURE — 99202 PR OFFICE/OUTPT VISIT, NEW, LEVL II, 15-29 MIN: ICD-10-PCS | Mod: 25,HCNC,S$GLB, | Performed by: DERMATOLOGY

## 2020-08-24 PROCEDURE — 11300 SHAVE SKIN LESION 0.5 CM/<: CPT | Mod: HCNC,S$GLB,, | Performed by: DERMATOLOGY

## 2020-08-24 PROCEDURE — 88305 TISSUE EXAM BY PATHOLOGIST: CPT | Mod: HCNC | Performed by: DERMATOLOGY

## 2020-08-24 PROCEDURE — 99202 OFFICE O/P NEW SF 15 MIN: CPT | Mod: 25,HCNC,S$GLB, | Performed by: DERMATOLOGY

## 2020-08-24 PROCEDURE — 1101F PT FALLS ASSESS-DOCD LE1/YR: CPT | Mod: HCNC,CPTII,S$GLB, | Performed by: DERMATOLOGY

## 2020-08-24 PROCEDURE — 1159F PR MEDICATION LIST DOCUMENTED IN MEDICAL RECORD: ICD-10-PCS | Mod: HCNC,S$GLB,, | Performed by: DERMATOLOGY

## 2020-08-24 PROCEDURE — 1126F AMNT PAIN NOTED NONE PRSNT: CPT | Mod: HCNC,S$GLB,, | Performed by: DERMATOLOGY

## 2020-08-24 PROCEDURE — 99999 PR PBB SHADOW E&M-EST. PATIENT-LVL III: ICD-10-PCS | Mod: PBBFAC,HCNC,, | Performed by: DERMATOLOGY

## 2020-08-24 PROCEDURE — 1159F MED LIST DOCD IN RCRD: CPT | Mod: HCNC,S$GLB,, | Performed by: DERMATOLOGY

## 2020-08-24 NOTE — LETTER
August 24, 2020      Uri Marrufo MD  [address]  [place] [address]           [place] - Dermatology  [address]  [place] [address]  Phone: [phone]  Fax: [phone]          Patient: Juan Neves   MR Number: [id number]   YOB: 1945   Date of Visit: 8/24/2020       Dear Dr. Uri Marrufo:    Thank you for referring Juan Neves to me for evaluation. Attached you will find relevant portions of my assessment and plan of care.    If you have questions, please do not hesitate to call me. I look forward to following Juan Neves along with you.    Sincerely,    Tonya Chase MD    Enclosure  CC:  No Recipients    If you would like to receive this communication electronically, please contact [email or web address] or [phone] to request more information on [employer] access.    For providers and/or their staff who would like to refer a patient to [facility], please contact us through our one-stop-shop provider referral line, [facility] [place], at [phone].    If you feel you have received this communication in error or would no longer like to receive these types of communications, please e-mail [email or web address]

## 2020-08-24 NOTE — PROGRESS NOTES
Subjective:       Patient ID:  Juan Neves is a 75 y.o. male who presents for   Chief Complaint   Patient presents with    Mole     back    Rash     back, itching      Spot on back noted per his wife not sure how long has been present.  Also itching on back and arms off and on no rash.     Mole - Initial  Affected locations: back  Signs / symptoms: itching  Aggravated by: nothing  Relieving factors/Treatments tried: Rx topical steroids    Rash        Review of Systems   Constitutional: Negative for fever, chills, weight loss, weight gain, fatigue, night sweats and malaise.   Skin: Positive for itching. Negative for rash, daily sunscreen use, activity-related sunscreen use and wears hat.   Hematologic/Lymphatic: Does not bruise/bleed easily.        Objective:    Physical Exam   Constitutional: He appears well-developed and well-nourished. No distress.   Neurological: He is alert and oriented to person, place, and time. He is not disoriented.   Psychiatric: He has a normal mood and affect.   Skin:   Areas Examined (abnormalities noted in diagram):   Head / Face Inspection Performed  Neck Inspection Performed  Chest / Axilla Inspection Performed  Back Inspection Performed  RUE Inspected  LUE Inspection Performed              Diagram Legend     Erythematous scaling macule/papule c/w actinic keratosis       Vascular papule c/w angioma      Pigmented verrucoid papule/plaque c/w seborrheic keratosis      Yellow umbilicated papule c/w sebaceous hyperplasia      Irregularly shaped tan macule c/w lentigo     1-2 mm smooth white papules consistent with Milia      Movable subcutaneous cyst with punctum c/w epidermal inclusion cyst      Subcutaneous movable cyst c/w pilar cyst      Firm pink to brown papule c/w dermatofibroma      Pedunculated fleshy papule(s) c/w skin tag(s)      Evenly pigmented macule c/w junctional nevus     Mildly variegated pigmented, slightly irregular-bordered macule c/w mildly atypical nevus     "  Flesh colored to evenly pigmented papule c/w intradermal nevus       Pink pearly papule/plaque c/w basal cell carcinoma      Erythematous hyperkeratotic cursted plaque c/w SCC      Surgical scar with no sign of skin cancer recurrence      Open and closed comedones      Inflammatory papules and pustules      Verrucoid papule consistent consistent with wart     Erythematous eczematous patches and plaques     Dystrophic onycholytic nail with subungual debris c/w onychomycosis     Umbilicated papule    Erythematous-base heme-crusted tan verrucoid plaque consistent with inflamed seborrheic keratosis     Erythematous Silvery Scaling Plaque c/w Psoriasis     See annotation      Assessment / Plan:      Pathology Orders:     Normal Orders This Visit    Specimen to Pathology, Dermatology     Questions:    Procedure Type: Dermatology and skin neoplasms    Number of Specimens: 1    ------------------------: -------------------------    Spec 1 Procedure: Biopsy    Spec 1 Clinical Impression: nevus    Spec 1 Source: left back        Neoplasm of uncertain behavior of skin  -     Specimen to Pathology, Dermatology  Shave removal procedure note:    Shave removal performed after verbal consent including risk of infection, scar, recurrence, need for additional treatment of site. Area prepped with alcohol, anesthetized 1% lidocaine with epinephrine. Lesional tissue shaved. Lesion defect size 4mm No complications. Dressing applied. Wound care explained.              Dysplastic nevus of trunk  -     Ambulatory referral/consult to Dermatology    Notalgia paresthetica  No hot water  cerave itch relief    Brachioradial pruritus  Long sleeves  cerave itch relief.    Lentigines  The "ABCD" rules to observe pigmented lesions were reviewed.  suncreen           Follow up in about 1 year (around 8/24/2021).  "

## 2020-08-27 LAB
FINAL PATHOLOGIC DIAGNOSIS: NORMAL
GROSS: NORMAL
MICROSCOPIC EXAM: NORMAL

## 2021-01-06 ENCOUNTER — IMMUNIZATION (OUTPATIENT)
Dept: FAMILY MEDICINE | Facility: CLINIC | Age: 76
End: 2021-01-06
Payer: MEDICARE

## 2021-01-06 DIAGNOSIS — Z23 NEED FOR VACCINATION: ICD-10-CM

## 2021-01-06 PROCEDURE — 91300 COVID-19, MRNA, LNP-S, PF, 30 MCG/0.3 ML DOSE VACCINE: CPT | Mod: PBBFAC | Performed by: FAMILY MEDICINE

## 2021-01-27 ENCOUNTER — IMMUNIZATION (OUTPATIENT)
Dept: FAMILY MEDICINE | Facility: CLINIC | Age: 76
End: 2021-01-27
Payer: MEDICARE

## 2021-01-27 DIAGNOSIS — Z23 NEED FOR VACCINATION: Primary | ICD-10-CM

## 2021-01-27 PROCEDURE — 91300 COVID-19, MRNA, LNP-S, PF, 30 MCG/0.3 ML DOSE VACCINE: CPT | Mod: PBBFAC | Performed by: FAMILY MEDICINE

## 2021-01-27 PROCEDURE — 0002A COVID-19, MRNA, LNP-S, PF, 30 MCG/0.3 ML DOSE VACCINE: CPT | Mod: PBBFAC | Performed by: FAMILY MEDICINE

## 2021-04-16 ENCOUNTER — PES CALL (OUTPATIENT)
Dept: ADMINISTRATIVE | Facility: CLINIC | Age: 76
End: 2021-04-16

## 2021-06-12 DIAGNOSIS — E78.00 PURE HYPERCHOLESTEROLEMIA: ICD-10-CM

## 2021-06-14 RX ORDER — ATORVASTATIN CALCIUM 20 MG/1
TABLET, FILM COATED ORAL
Qty: 90 TABLET | Refills: 1 | Status: SHIPPED | OUTPATIENT
Start: 2021-06-14 | End: 2023-01-17 | Stop reason: SDUPTHER

## 2021-06-28 ENCOUNTER — PES CALL (OUTPATIENT)
Dept: ADMINISTRATIVE | Facility: CLINIC | Age: 76
End: 2021-06-28

## 2021-07-08 ENCOUNTER — PATIENT OUTREACH (OUTPATIENT)
Dept: ADMINISTRATIVE | Facility: HOSPITAL | Age: 76
End: 2021-07-08

## 2021-07-13 ENCOUNTER — LAB VISIT (OUTPATIENT)
Dept: LAB | Facility: HOSPITAL | Age: 76
End: 2021-07-13
Attending: INTERNAL MEDICINE
Payer: MEDICARE

## 2021-07-13 DIAGNOSIS — E78.00 PURE HYPERCHOLESTEROLEMIA: ICD-10-CM

## 2021-07-13 DIAGNOSIS — R79.89 ABNORMAL CBC: ICD-10-CM

## 2021-07-13 LAB
ALBUMIN SERPL BCP-MCNC: 4.2 G/DL (ref 3.5–5.2)
ALP SERPL-CCNC: 85 U/L (ref 55–135)
ALT SERPL W/O P-5'-P-CCNC: 17 U/L (ref 10–44)
ANION GAP SERPL CALC-SCNC: 9 MMOL/L (ref 8–16)
AST SERPL-CCNC: 19 U/L (ref 10–40)
BILIRUB SERPL-MCNC: 0.7 MG/DL (ref 0.1–1)
BUN SERPL-MCNC: 19 MG/DL (ref 8–23)
CALCIUM SERPL-MCNC: 9.9 MG/DL (ref 8.7–10.5)
CHLORIDE SERPL-SCNC: 106 MMOL/L (ref 95–110)
CHOLEST SERPL-MCNC: 180 MG/DL (ref 120–199)
CHOLEST/HDLC SERPL: 3.4 {RATIO} (ref 2–5)
CO2 SERPL-SCNC: 25 MMOL/L (ref 23–29)
CREAT SERPL-MCNC: 1 MG/DL (ref 0.5–1.4)
EST. GFR  (AFRICAN AMERICAN): >60 ML/MIN/1.73 M^2
EST. GFR  (NON AFRICAN AMERICAN): >60 ML/MIN/1.73 M^2
GLUCOSE SERPL-MCNC: 86 MG/DL (ref 70–110)
HDLC SERPL-MCNC: 53 MG/DL (ref 40–75)
HDLC SERPL: 29.4 % (ref 20–50)
LDLC SERPL CALC-MCNC: 102.6 MG/DL (ref 63–159)
NONHDLC SERPL-MCNC: 127 MG/DL
POTASSIUM SERPL-SCNC: 4.7 MMOL/L (ref 3.5–5.1)
PROT SERPL-MCNC: 7.4 G/DL (ref 6–8.4)
SODIUM SERPL-SCNC: 140 MMOL/L (ref 136–145)
TRIGL SERPL-MCNC: 122 MG/DL (ref 30–150)

## 2021-07-13 PROCEDURE — 85025 COMPLETE CBC W/AUTO DIFF WBC: CPT | Performed by: INTERNAL MEDICINE

## 2021-07-13 PROCEDURE — 80053 COMPREHEN METABOLIC PANEL: CPT | Performed by: INTERNAL MEDICINE

## 2021-07-13 PROCEDURE — 36415 COLL VENOUS BLD VENIPUNCTURE: CPT | Mod: PO | Performed by: INTERNAL MEDICINE

## 2021-07-13 PROCEDURE — 80061 LIPID PANEL: CPT | Performed by: INTERNAL MEDICINE

## 2021-07-14 LAB
BASOPHILS # BLD AUTO: 0.08 K/UL (ref 0–0.2)
BASOPHILS NFR BLD: 1.3 % (ref 0–1.9)
DIFFERENTIAL METHOD: ABNORMAL
EOSINOPHIL # BLD AUTO: 0.3 K/UL (ref 0–0.5)
EOSINOPHIL NFR BLD: 5.3 % (ref 0–8)
ERYTHROCYTE [DISTWIDTH] IN BLOOD BY AUTOMATED COUNT: 15.4 % (ref 11.5–14.5)
HCT VFR BLD AUTO: 45.8 % (ref 40–54)
HGB BLD-MCNC: 14.8 G/DL (ref 14–18)
IMM GRANULOCYTES # BLD AUTO: 0.01 K/UL (ref 0–0.04)
IMM GRANULOCYTES NFR BLD AUTO: 0.2 % (ref 0–0.5)
LYMPHOCYTES # BLD AUTO: 1.6 K/UL (ref 1–4.8)
LYMPHOCYTES NFR BLD: 25.7 % (ref 18–48)
MCH RBC QN AUTO: 27.7 PG (ref 27–31)
MCHC RBC AUTO-ENTMCNC: 32.3 G/DL (ref 32–36)
MCV RBC AUTO: 86 FL (ref 82–98)
MONOCYTES # BLD AUTO: 0.5 K/UL (ref 0.3–1)
MONOCYTES NFR BLD: 7.2 % (ref 4–15)
NEUTROPHILS # BLD AUTO: 3.8 K/UL (ref 1.8–7.7)
NEUTROPHILS NFR BLD: 60.3 % (ref 38–73)
NRBC BLD-RTO: 0 /100 WBC
PLATELET # BLD AUTO: 239 K/UL (ref 150–450)
PMV BLD AUTO: 11.9 FL (ref 9.2–12.9)
RBC # BLD AUTO: 5.35 M/UL (ref 4.6–6.2)
WBC # BLD AUTO: 6.23 K/UL (ref 3.9–12.7)

## 2021-07-20 ENCOUNTER — TELEPHONE (OUTPATIENT)
Dept: FAMILY MEDICINE | Facility: CLINIC | Age: 76
End: 2021-07-20

## 2021-07-27 ENCOUNTER — TELEPHONE (OUTPATIENT)
Dept: OPTOMETRY | Facility: CLINIC | Age: 76
End: 2021-07-27

## 2021-08-02 ENCOUNTER — PATIENT OUTREACH (OUTPATIENT)
Dept: ADMINISTRATIVE | Facility: OTHER | Age: 76
End: 2021-08-02

## 2021-08-03 ENCOUNTER — OFFICE VISIT (OUTPATIENT)
Dept: OPTOMETRY | Facility: CLINIC | Age: 76
End: 2021-08-03
Payer: MEDICARE

## 2021-08-03 DIAGNOSIS — H25.13 NUCLEAR SCLEROSIS OF BOTH EYES: ICD-10-CM

## 2021-08-03 DIAGNOSIS — H43.393 VISUAL FLOATERS, BILATERAL: Primary | ICD-10-CM

## 2021-08-03 DIAGNOSIS — Z46.0 FITTING AND ADJUSTMENT OF SPECTACLES AND CONTACT LENSES: Primary | ICD-10-CM

## 2021-08-03 DIAGNOSIS — H52.03 HYPEROPIA WITH PRESBYOPIA OF BOTH EYES: ICD-10-CM

## 2021-08-03 DIAGNOSIS — H52.4 HYPEROPIA WITH PRESBYOPIA OF BOTH EYES: ICD-10-CM

## 2021-08-03 PROCEDURE — 3288F FALL RISK ASSESSMENT DOCD: CPT | Mod: CPTII,S$GLB,, | Performed by: OPTOMETRIST

## 2021-08-03 PROCEDURE — 99999 PR PBB SHADOW E&M-EST. PATIENT-LVL II: CPT | Mod: PBBFAC,,, | Performed by: OPTOMETRIST

## 2021-08-03 PROCEDURE — 3288F PR FALLS RISK ASSESSMENT DOCUMENTED: ICD-10-PCS | Mod: CPTII,S$GLB,, | Performed by: OPTOMETRIST

## 2021-08-03 PROCEDURE — 99999 PR PBB SHADOW E&M-EST. PATIENT-LVL I: ICD-10-PCS | Mod: PBBFAC,,, | Performed by: OPTOMETRIST

## 2021-08-03 PROCEDURE — 92014 PR EYE EXAM, EST PATIENT,COMPREHESV: ICD-10-PCS | Mod: S$GLB,,, | Performed by: OPTOMETRIST

## 2021-08-03 PROCEDURE — 92310 PR CONTACT LENS FITTING (NO CHANGE): ICD-10-PCS | Mod: CSM,S$GLB,, | Performed by: OPTOMETRIST

## 2021-08-03 PROCEDURE — 1101F PR PT FALLS ASSESS DOC 0-1 FALLS W/OUT INJ PAST YR: ICD-10-PCS | Mod: CPTII,S$GLB,, | Performed by: OPTOMETRIST

## 2021-08-03 PROCEDURE — 1160F RVW MEDS BY RX/DR IN RCRD: CPT | Mod: CPTII,S$GLB,, | Performed by: OPTOMETRIST

## 2021-08-03 PROCEDURE — 99999 PR PBB SHADOW E&M-EST. PATIENT-LVL I: CPT | Mod: PBBFAC,,, | Performed by: OPTOMETRIST

## 2021-08-03 PROCEDURE — 1160F PR REVIEW ALL MEDS BY PRESCRIBER/CLIN PHARMACIST DOCUMENTED: ICD-10-PCS | Mod: CPTII,S$GLB,, | Performed by: OPTOMETRIST

## 2021-08-03 PROCEDURE — 1159F PR MEDICATION LIST DOCUMENTED IN MEDICAL RECORD: ICD-10-PCS | Mod: CPTII,S$GLB,, | Performed by: OPTOMETRIST

## 2021-08-03 PROCEDURE — 1126F PR PAIN SEVERITY QUANTIFIED, NO PAIN PRESENT: ICD-10-PCS | Mod: CPTII,S$GLB,, | Performed by: OPTOMETRIST

## 2021-08-03 PROCEDURE — 1101F PT FALLS ASSESS-DOCD LE1/YR: CPT | Mod: CPTII,S$GLB,, | Performed by: OPTOMETRIST

## 2021-08-03 PROCEDURE — 1126F AMNT PAIN NOTED NONE PRSNT: CPT | Mod: CPTII,S$GLB,, | Performed by: OPTOMETRIST

## 2021-08-03 PROCEDURE — 92015 PR REFRACTION: ICD-10-PCS | Mod: S$GLB,,, | Performed by: OPTOMETRIST

## 2021-08-03 PROCEDURE — 99999 PR PBB SHADOW E&M-EST. PATIENT-LVL II: ICD-10-PCS | Mod: PBBFAC,,, | Performed by: OPTOMETRIST

## 2021-08-03 PROCEDURE — 1159F MED LIST DOCD IN RCRD: CPT | Mod: CPTII,S$GLB,, | Performed by: OPTOMETRIST

## 2021-08-03 PROCEDURE — 92015 DETERMINE REFRACTIVE STATE: CPT | Mod: S$GLB,,, | Performed by: OPTOMETRIST

## 2021-08-03 PROCEDURE — 92310 CONTACT LENS FITTING OU: CPT | Mod: CSM,S$GLB,, | Performed by: OPTOMETRIST

## 2021-08-03 PROCEDURE — 92014 COMPRE OPH EXAM EST PT 1/>: CPT | Mod: S$GLB,,, | Performed by: OPTOMETRIST

## 2021-10-04 ENCOUNTER — PATIENT MESSAGE (OUTPATIENT)
Dept: ADMINISTRATIVE | Facility: HOSPITAL | Age: 76
End: 2021-10-04

## 2021-10-05 ENCOUNTER — IMMUNIZATION (OUTPATIENT)
Dept: FAMILY MEDICINE | Facility: CLINIC | Age: 76
End: 2021-10-05
Payer: MEDICARE

## 2021-10-05 DIAGNOSIS — Z23 NEED FOR VACCINATION: Primary | ICD-10-CM

## 2021-10-05 PROCEDURE — 0003A COVID-19, MRNA, LNP-S, PF, 30 MCG/0.3 ML DOSE VACCINE: CPT | Mod: HCNC,PBBFAC | Performed by: FAMILY MEDICINE

## 2021-10-05 PROCEDURE — 91300 COVID-19, MRNA, LNP-S, PF, 30 MCG/0.3 ML DOSE VACCINE: CPT | Mod: HCNC,PBBFAC | Performed by: FAMILY MEDICINE

## 2021-11-18 ENCOUNTER — CLINICAL SUPPORT (OUTPATIENT)
Dept: FAMILY MEDICINE | Facility: CLINIC | Age: 76
End: 2021-11-18
Payer: MEDICARE

## 2021-11-18 PROCEDURE — 90694 FLU VACCINE - QUADRIVALENT - ADJUVANTED: ICD-10-PCS | Mod: HCNC,S$GLB,, | Performed by: INTERNAL MEDICINE

## 2021-11-18 PROCEDURE — G0008 ADMIN INFLUENZA VIRUS VAC: HCPCS | Mod: HCNC,S$GLB,, | Performed by: INTERNAL MEDICINE

## 2021-11-18 PROCEDURE — G0008 FLU VACCINE - QUADRIVALENT - ADJUVANTED: ICD-10-PCS | Mod: HCNC,S$GLB,, | Performed by: INTERNAL MEDICINE

## 2021-11-18 PROCEDURE — 90694 VACC AIIV4 NO PRSRV 0.5ML IM: CPT | Mod: HCNC,S$GLB,, | Performed by: INTERNAL MEDICINE

## 2021-12-07 DIAGNOSIS — L30.9 DERMATITIS: ICD-10-CM

## 2021-12-07 RX ORDER — TRIAMCINOLONE ACETONIDE 5 MG/G
CREAM TOPICAL
Qty: 30 G | Refills: 0 | Status: SHIPPED | OUTPATIENT
Start: 2021-12-07 | End: 2023-01-10

## 2021-12-09 NOTE — PROGRESS NOTES
Contact lens exam done, see exam of same date for documentation.    Resting comfortably with epidural.  Has been difficult to get contractions adequate with pitocin but making good cervical change now in active labor. FHT cat 1. Tolerating magnesium well, most recent BPs mild range. UOP adequate. Hopefully complete soon.    Maryan Adams MD

## 2021-12-21 ENCOUNTER — OFFICE VISIT (OUTPATIENT)
Dept: FAMILY MEDICINE | Facility: CLINIC | Age: 76
End: 2021-12-21
Payer: MEDICARE

## 2021-12-21 VITALS
BODY MASS INDEX: 28.41 KG/M2 | HEIGHT: 74 IN | OXYGEN SATURATION: 99 % | DIASTOLIC BLOOD PRESSURE: 74 MMHG | WEIGHT: 221.38 LBS | HEART RATE: 56 BPM | SYSTOLIC BLOOD PRESSURE: 130 MMHG

## 2021-12-21 DIAGNOSIS — E78.2 MIXED HYPERLIPIDEMIA: ICD-10-CM

## 2021-12-21 DIAGNOSIS — G47.09 OTHER INSOMNIA: ICD-10-CM

## 2021-12-21 DIAGNOSIS — Z12.5 PROSTATE CANCER SCREENING: ICD-10-CM

## 2021-12-21 DIAGNOSIS — L30.9 DERMATITIS: Primary | ICD-10-CM

## 2021-12-21 DIAGNOSIS — I10 PRIMARY HYPERTENSION: ICD-10-CM

## 2021-12-21 PROCEDURE — 99214 OFFICE O/P EST MOD 30 MIN: CPT | Mod: HCNC,S$GLB,, | Performed by: FAMILY MEDICINE

## 2021-12-21 PROCEDURE — 99214 PR OFFICE/OUTPT VISIT, EST, LEVL IV, 30-39 MIN: ICD-10-PCS | Mod: HCNC,S$GLB,, | Performed by: FAMILY MEDICINE

## 2021-12-21 PROCEDURE — 99499 UNLISTED E&M SERVICE: CPT | Mod: S$GLB,,, | Performed by: FAMILY MEDICINE

## 2021-12-21 PROCEDURE — 99999 PR PBB SHADOW E&M-EST. PATIENT-LVL III: ICD-10-PCS | Mod: PBBFAC,HCNC,, | Performed by: FAMILY MEDICINE

## 2021-12-21 PROCEDURE — 99999 PR PBB SHADOW E&M-EST. PATIENT-LVL III: CPT | Mod: PBBFAC,HCNC,, | Performed by: FAMILY MEDICINE

## 2021-12-21 PROCEDURE — 99499 RISK ADDL DX/OHS AUDIT: ICD-10-PCS | Mod: S$GLB,,, | Performed by: FAMILY MEDICINE

## 2021-12-21 RX ORDER — MIRTAZAPINE 15 MG/1
15 TABLET, FILM COATED ORAL NIGHTLY
Qty: 90 TABLET | Refills: 3 | Status: SHIPPED | OUTPATIENT
Start: 2021-12-21 | End: 2023-01-10

## 2021-12-21 RX ORDER — CLOBETASOL PROPIONATE 0.5 MG/G
CREAM TOPICAL 2 TIMES DAILY
Qty: 15 G | Refills: 0 | Status: SHIPPED | OUTPATIENT
Start: 2021-12-21 | End: 2023-01-10

## 2021-12-21 RX ORDER — CLOBETASOL PROPIONATE 0.5 MG/G
CREAM TOPICAL 2 TIMES DAILY
Qty: 15 G | Refills: 0 | Status: SHIPPED | OUTPATIENT
Start: 2021-12-21 | End: 2021-12-21

## 2022-08-13 ENCOUNTER — PES CALL (OUTPATIENT)
Dept: ADMINISTRATIVE | Facility: CLINIC | Age: 77
End: 2022-08-13
Payer: MEDICARE

## 2023-01-10 ENCOUNTER — LAB VISIT (OUTPATIENT)
Dept: LAB | Facility: HOSPITAL | Age: 78
End: 2023-01-10
Attending: INTERNAL MEDICINE
Payer: MEDICARE

## 2023-01-10 ENCOUNTER — OFFICE VISIT (OUTPATIENT)
Dept: INTERNAL MEDICINE | Facility: CLINIC | Age: 78
End: 2023-01-10
Payer: MEDICARE

## 2023-01-10 VITALS
WEIGHT: 235.44 LBS | HEIGHT: 74 IN | HEART RATE: 62 BPM | DIASTOLIC BLOOD PRESSURE: 90 MMHG | OXYGEN SATURATION: 97 % | BODY MASS INDEX: 30.21 KG/M2 | SYSTOLIC BLOOD PRESSURE: 130 MMHG

## 2023-01-10 DIAGNOSIS — Z11.59 ENCOUNTER FOR HEPATITIS C SCREENING TEST FOR LOW RISK PATIENT: ICD-10-CM

## 2023-01-10 DIAGNOSIS — R03.0 ELEVATED BP WITHOUT DIAGNOSIS OF HYPERTENSION: Primary | ICD-10-CM

## 2023-01-10 DIAGNOSIS — R68.2 DRY MOUTH: ICD-10-CM

## 2023-01-10 DIAGNOSIS — R03.0 ELEVATED BP WITHOUT DIAGNOSIS OF HYPERTENSION: ICD-10-CM

## 2023-01-10 DIAGNOSIS — Z23 NEED FOR VACCINATION: ICD-10-CM

## 2023-01-10 DIAGNOSIS — H04.129 DRY EYE: ICD-10-CM

## 2023-01-10 DIAGNOSIS — E78.00 PURE HYPERCHOLESTEROLEMIA: Chronic | ICD-10-CM

## 2023-01-10 DIAGNOSIS — Z00.00 PREVENTATIVE HEALTH CARE: ICD-10-CM

## 2023-01-10 LAB
ALBUMIN SERPL BCP-MCNC: 4.4 G/DL (ref 3.5–5.2)
ALP SERPL-CCNC: 92 U/L (ref 55–135)
ALT SERPL W/O P-5'-P-CCNC: 28 U/L (ref 10–44)
ANION GAP SERPL CALC-SCNC: 9 MMOL/L (ref 8–16)
AST SERPL-CCNC: 23 U/L (ref 10–40)
BILIRUB DIRECT SERPL-MCNC: 0.2 MG/DL (ref 0.1–0.3)
BILIRUB SERPL-MCNC: 0.6 MG/DL (ref 0.1–1)
BUN SERPL-MCNC: 16 MG/DL (ref 8–23)
CALCIUM SERPL-MCNC: 10.6 MG/DL (ref 8.7–10.5)
CHLORIDE SERPL-SCNC: 104 MMOL/L (ref 95–110)
CHOLEST SERPL-MCNC: 206 MG/DL (ref 120–199)
CHOLEST/HDLC SERPL: 4 {RATIO} (ref 2–5)
CO2 SERPL-SCNC: 27 MMOL/L (ref 23–29)
CREAT SERPL-MCNC: 0.9 MG/DL (ref 0.5–1.4)
EST. GFR  (NO RACE VARIABLE): >60 ML/MIN/1.73 M^2
GLUCOSE SERPL-MCNC: 86 MG/DL (ref 70–110)
HCV AB SERPL QL IA: NORMAL
HDLC SERPL-MCNC: 52 MG/DL (ref 40–75)
HDLC SERPL: 25.2 % (ref 20–50)
LDLC SERPL CALC-MCNC: 131.2 MG/DL (ref 63–159)
NONHDLC SERPL-MCNC: 154 MG/DL
POTASSIUM SERPL-SCNC: 5 MMOL/L (ref 3.5–5.1)
PROT SERPL-MCNC: 7.9 G/DL (ref 6–8.4)
SODIUM SERPL-SCNC: 140 MMOL/L (ref 136–145)
TRIGL SERPL-MCNC: 114 MG/DL (ref 30–150)

## 2023-01-10 PROCEDURE — 1159F MED LIST DOCD IN RCRD: CPT | Mod: HCNC,CPTII,S$GLB, | Performed by: INTERNAL MEDICINE

## 2023-01-10 PROCEDURE — 1126F AMNT PAIN NOTED NONE PRSNT: CPT | Mod: HCNC,CPTII,S$GLB, | Performed by: INTERNAL MEDICINE

## 2023-01-10 PROCEDURE — 86803 HEPATITIS C AB TEST: CPT | Mod: HCNC | Performed by: INTERNAL MEDICINE

## 2023-01-10 PROCEDURE — 1101F PT FALLS ASSESS-DOCD LE1/YR: CPT | Mod: HCNC,CPTII,S$GLB, | Performed by: INTERNAL MEDICINE

## 2023-01-10 PROCEDURE — 80076 HEPATIC FUNCTION PANEL: CPT | Mod: HCNC | Performed by: INTERNAL MEDICINE

## 2023-01-10 PROCEDURE — 3288F PR FALLS RISK ASSESSMENT DOCUMENTED: ICD-10-PCS | Mod: HCNC,CPTII,S$GLB, | Performed by: INTERNAL MEDICINE

## 2023-01-10 PROCEDURE — 1160F PR REVIEW ALL MEDS BY PRESCRIBER/CLIN PHARMACIST DOCUMENTED: ICD-10-PCS | Mod: HCNC,CPTII,S$GLB, | Performed by: INTERNAL MEDICINE

## 2023-01-10 PROCEDURE — 1126F PR PAIN SEVERITY QUANTIFIED, NO PAIN PRESENT: ICD-10-PCS | Mod: HCNC,CPTII,S$GLB, | Performed by: INTERNAL MEDICINE

## 2023-01-10 PROCEDURE — 1101F PR PT FALLS ASSESS DOC 0-1 FALLS W/OUT INJ PAST YR: ICD-10-PCS | Mod: HCNC,CPTII,S$GLB, | Performed by: INTERNAL MEDICINE

## 2023-01-10 PROCEDURE — 1160F RVW MEDS BY RX/DR IN RCRD: CPT | Mod: HCNC,CPTII,S$GLB, | Performed by: INTERNAL MEDICINE

## 2023-01-10 PROCEDURE — 80061 LIPID PANEL: CPT | Mod: HCNC | Performed by: INTERNAL MEDICINE

## 2023-01-10 PROCEDURE — 99999 PR PBB SHADOW E&M-EST. PATIENT-LVL III: ICD-10-PCS | Mod: PBBFAC,HCNC,, | Performed by: INTERNAL MEDICINE

## 2023-01-10 PROCEDURE — 86235 NUCLEAR ANTIGEN ANTIBODY: CPT | Mod: 59,HCNC | Performed by: INTERNAL MEDICINE

## 2023-01-10 PROCEDURE — 3075F SYST BP GE 130 - 139MM HG: CPT | Mod: HCNC,CPTII,S$GLB, | Performed by: INTERNAL MEDICINE

## 2023-01-10 PROCEDURE — 3075F PR MOST RECENT SYSTOLIC BLOOD PRESS GE 130-139MM HG: ICD-10-PCS | Mod: HCNC,CPTII,S$GLB, | Performed by: INTERNAL MEDICINE

## 2023-01-10 PROCEDURE — 86235 NUCLEAR ANTIGEN ANTIBODY: CPT | Mod: HCNC | Performed by: INTERNAL MEDICINE

## 2023-01-10 PROCEDURE — 3288F FALL RISK ASSESSMENT DOCD: CPT | Mod: HCNC,CPTII,S$GLB, | Performed by: INTERNAL MEDICINE

## 2023-01-10 PROCEDURE — 80048 BASIC METABOLIC PNL TOTAL CA: CPT | Mod: HCNC | Performed by: INTERNAL MEDICINE

## 2023-01-10 PROCEDURE — 90694 VACC AIIV4 NO PRSRV 0.5ML IM: CPT | Mod: HCNC,S$GLB,, | Performed by: INTERNAL MEDICINE

## 2023-01-10 PROCEDURE — G0008 ADMIN INFLUENZA VIRUS VAC: HCPCS | Mod: HCNC,S$GLB,, | Performed by: INTERNAL MEDICINE

## 2023-01-10 PROCEDURE — G0008 FLU VACCINE - QUADRIVALENT - ADJUVANTED: ICD-10-PCS | Mod: HCNC,S$GLB,, | Performed by: INTERNAL MEDICINE

## 2023-01-10 PROCEDURE — 36415 COLL VENOUS BLD VENIPUNCTURE: CPT | Mod: HCNC,PO | Performed by: INTERNAL MEDICINE

## 2023-01-10 PROCEDURE — 3080F PR MOST RECENT DIASTOLIC BLOOD PRESSURE >= 90 MM HG: ICD-10-PCS | Mod: HCNC,CPTII,S$GLB, | Performed by: INTERNAL MEDICINE

## 2023-01-10 PROCEDURE — 99215 PR OFFICE/OUTPT VISIT, EST, LEVL V, 40-54 MIN: ICD-10-PCS | Mod: HCNC,S$GLB,, | Performed by: INTERNAL MEDICINE

## 2023-01-10 PROCEDURE — 90694 FLU VACCINE - QUADRIVALENT - ADJUVANTED: ICD-10-PCS | Mod: HCNC,S$GLB,, | Performed by: INTERNAL MEDICINE

## 2023-01-10 PROCEDURE — 99999 PR PBB SHADOW E&M-EST. PATIENT-LVL III: CPT | Mod: PBBFAC,HCNC,, | Performed by: INTERNAL MEDICINE

## 2023-01-10 PROCEDURE — 3080F DIAST BP >= 90 MM HG: CPT | Mod: HCNC,CPTII,S$GLB, | Performed by: INTERNAL MEDICINE

## 2023-01-10 PROCEDURE — 99215 OFFICE O/P EST HI 40 MIN: CPT | Mod: HCNC,S$GLB,, | Performed by: INTERNAL MEDICINE

## 2023-01-10 PROCEDURE — 1159F PR MEDICATION LIST DOCUMENTED IN MEDICAL RECORD: ICD-10-PCS | Mod: HCNC,CPTII,S$GLB, | Performed by: INTERNAL MEDICINE

## 2023-01-10 NOTE — PATIENT INSTRUCTIONS
Please see the web sites to lear more about advance care planning     1. [email or web address]  2. [email or web address]  3. [email or web address].org     [email or web address]      You can message [email or web address] to scan the forms   Or   You can take a picture and send the forms via the patient portal ( send a message to your doctor section)

## 2023-01-10 NOTE — PROGRESS NOTES
Assessment:       1. Elevated BP without diagnosis of hypertension    2. Preventative health care    3. Need for vaccination    4. Dry mouth    5. Dry eye    6. Pure hypercholesterolemia    7. Encounter for hepatitis C screening test for low risk patient              Plan:             Juan was seen today for establish care.    Diagnoses and all orders for this visit:    Elevated BP without diagnosis of hypertension  -     Basic Metabolic Panel; Future  -     Hepatic Function Panel; Future    Preventative health care    Need for vaccination  -     Influenza - Quadrivalent (Adjuvanted)    Dry mouth  -     ANTI -SSA ANTIBODY; Future  -     ANTI-SSB ANTIBODY; Future    Dry eye  -     ANTI -SSA ANTIBODY; Future  -     ANTI-SSB ANTIBODY; Future    Pure hypercholesterolemia  -     Lipid Panel; Future    Encounter for hepatitis C screening test for low risk patient  -     Hepatitis C Antibody; Future      Check labs today  Fu bp log   2 week OV   Can start meds if bp remains elevated consider amlodipnie  Check labs for dry mouth/eyes        I spent at least 40 mins with preparing to see the patient, obtaining and/or revieweing separately obtained history, preforming a examination and evaluation, counseling, communicating with other health care professional, documenting clinical information in the electronic health record,  and/or coordinating care.             Subjective:       Patient ID: Juan Neves is a 77 y.o. male.    Chief Complaint:   Establish Care      HPI    #Last visit/Previous Provider  This patient is new to me  Previously seen by Rebel Welsh III, MD  Last visit was 2021        Link to History         #Interim Hx    No urgent care or ED/hospitalization    #Concerns Today    Reports dry eye and dry mouth over the last 6 months, no new medicaiotns,no fhx of autoimmune condition. Taking eye drops and saliva subsitiutes    #Chronic Problems  Elevated bp on check today, has had prior elevations, not  "consistently, never on bp medication     Patient Active Problem List   Diagnosis    Insomnia    Dupuytren's contracture of hand    JOE on CPAP    Bilateral bunions    Pure hypercholesterolemia         Health Maintenance Due   Topic Date Due    COVID-19 Vaccine (4 - Booster for Pfizer series) 11/30/2021       Health Maintenance Topics with due status: Not Due       Topic Last Completion Date    TETANUS VACCINE 08/04/2016    Colonoscopy 01/23/2017    Lipid Panel 12/21/2021         Tobacco Use: Medium Risk    Smoking Tobacco Use: Former    Smokeless Tobacco Use: Never    Passive Exposure: Not on file         Review of Systems   All other systems reviewed and are negative.      Objective:   BP (!) 130/90 (BP Location: Right arm, Patient Position: Sitting, BP Method: Medium (Manual))   Pulse 62   Ht 6' 2" (1.88 m)   Wt 106.8 kg (235 lb 7.2 oz)   SpO2 97%   BMI 30.23 kg/m²     Vitals 1/10/2023 12/21/2021 8/24/2020 7/20/2020 7/20/2020   Height 74 74 - 74 -   Weight (lbs) 235.45 221.4 208 208.11 208.11   BMI (kg/m2) 30.2 28.4 - 26.7 -            Physical Exam  Vitals and nursing note reviewed.   Constitutional:       General: He is not in acute distress.     Appearance: He is well-developed. He is not ill-appearing, toxic-appearing or diaphoretic.   HENT:      Head: Normocephalic.   Eyes:      Conjunctiva/sclera: Conjunctivae normal.   Cardiovascular:      Rate and Rhythm: Normal rate and regular rhythm.      Heart sounds: Normal heart sounds. No murmur heard.    No friction rub. No gallop.   Pulmonary:      Effort: Pulmonary effort is normal. No respiratory distress.   Abdominal:      General: There is no distension.      Palpations: Abdomen is soft.   Neurological:      General: No focal deficit present.      Mental Status: He is alert and oriented to person, place, and time.           ASCVD  The 10-year ASCVD risk score (Nicola ROGER, et al., 2019) is: 24.3%    Values used to calculate the score:      Age: 77 years     "  Sex: Male      Is Non- : No      Diabetic: No      Tobacco smoker: No      Systolic Blood Pressure: 130 mmHg      Is BP treated: No      HDL Cholesterol: 56 mg/dL      Total Cholesterol: 155 mg/dL    Basic Labs  BMP  Lab Results   Component Value Date     12/21/2021    K 4.8 12/21/2021     12/21/2021    CO2 30 (H) 12/21/2021    BUN 21 (H) 12/21/2021    CREATININE 1.10 12/21/2021    CALCIUM 9.8 12/21/2021    ANIONGAP 9 12/21/2021    ESTGFRAFRICA >60.0 12/21/2021    EGFRNONAA >60.0 12/21/2021     Lab Results   Component Value Date    ALT 21 12/21/2021    AST 27 12/21/2021    ALKPHOS 95 12/21/2021    BILITOT 0.7 12/21/2021       Lab Results   Component Value Date    TSH 3.130 03/15/2018       Lab Results   Component Value Date    WBC 5.65 12/21/2021    HGB 15.5 12/21/2021    HCT 46.3 12/21/2021    MCV 83 12/21/2021     12/21/2021           STD  No results found for: HIV1X2, [id number]  No results found for: RPR  Lab Results   Component Value Date    HEPCAB Negative 01/11/2017     No results found for: LABNGO, LABCHLA      Lipids  Lab Results   Component Value Date    CHOL 155 12/21/2021     Lab Results   Component Value Date    HDL 56 12/21/2021     Lab Results   Component Value Date    LDLCALC 77.2 12/21/2021     Lab Results   Component Value Date    TRIG 109 12/21/2021     Lab Results   Component Value Date    CHOLHDL 36.1 12/21/2021       DM  No results found for: LABA1C, HGBA1C    PSA  PSA, Screen (ng/mL)   Date Value   12/21/2021 0.79         Future Appointments   Date Time Provider Department Center   1/24/2023  3:00 PM Dillan Welhs III, MD [facility]           Medication List with Changes/Refills   Current Medications    ATORVASTATIN (LIPITOR) 20 MG TABLET    TAKE 1 TABLET ONE TIME DAILY    FLUTICASONE (FLONASE) 50 MCG/ACTUATION NASAL SPRAY    1 spray by Each Nare route once daily.   Discontinued Medications    CLOBETASOL (TEMOVATE) 0.05 % CREAM    Apply  topically 2 (two) times daily.    MIRTAZAPINE (REMERON) 15 MG TABLET    Take 1 tablet (15 mg total) by mouth every evening.    TRIAMCINOLONE ACETONIDE 0.5% (KENALOG) 0.5 % CREA    APPLY TO AFFECTED AREA TOPICALLY TWICE DAILY AS NEEDED (DO NOT USE ON FACE, ARMPITS, OR GROIN)       Disclaimer:  This note has been generated using voice-recognition software. There may be grammatical or spelling errors that have been missed during proof-reading       Vitals - 1 value per visit SYSTOLIC DIASTOLIC   5/21/2012 130 84   5/29/2012 136 80   6/4/2012 120 78   8/13/2012 114 78   4/18/2013 120 78   8/9/2013 154 92   8/9/2013 8/9/2013 145 80   8/19/2013 120 80   9/18/2013 120 70   1/30/2014 1/30/2014 130 80   5/14/2015 5/14/2015 120 80   6/22/2015 6/22/2015 145 75   1/4/2016     1/4/2016     2/15/2016     2/19/2016 134 66   2/19/2016 145 69   3/1/2016 129 69   3/1/2016 153 70   3/1/2016 153 70   3/1/2016 141 63   3/1/2016 140 65   3/1/2016 139 68   3/1/2016 127 63   3/1/2016 149 67   3/1/2016 124 55   3/1/2016 134 61   3/1/2016 124 60   3/1/2016 142 67   3/1/2016 149 72   3/1/2016 137 66   3/7/2016     3/7/2016     3/7/2016 131 82   3/7/2016 134 88   3/30/2016     3/30/2016 141 81   8/4/2016 8/4/2016 112 78   8/5/2016 11/30/2016 11/30/2016 118 74   1/11/2017 1/11/2017 112 74   1/19/2017 1/19/2017 110 68   1/19/2017 1/23/2017 149 88   1/23/2017 149 83   1/23/2017 146 87   1/23/2017 141 97   1/23/2017 149 80   1/23/2017 137 85   1/23/2017 141 83   1/23/2017 148 83   1/24/2017     3/1/2018     3/1/2018 116 80   3/13/2018     3/13/2018 144 78   3/14/2018     3/14/2018 124 80   9/19/2018 9/19/2018 120 88   11/2/2018 11/2/2018 132 64   4/1/2019 4/1/2019 124 80   8/7/2019 8/18/2019 157 79   10/8/2019     10/8/2019 146 76   1/8/2020 1/8/2020 145 81   2/14/2020 120 63   7/10/2020     7/20/2020     7/20/2020 142 90   7/20/2020 118 76   8/24/2020     8/24/2020     8/3/2021      12/21/2021 130 74   1/10/2023     1/10/2023 130 90

## 2023-01-13 LAB
ANTI-SSA ANTIBODY: 0.09 RATIO (ref 0–0.99)
ANTI-SSA INTERPRETATION: NEGATIVE
ANTI-SSB ANTIBODY: 0.07 RATIO (ref 0–0.99)
ANTI-SSB INTERPRETATION: NEGATIVE

## 2023-01-15 ENCOUNTER — PATIENT MESSAGE (OUTPATIENT)
Dept: INTERNAL MEDICINE | Facility: CLINIC | Age: 78
End: 2023-01-15
Payer: MEDICARE

## 2023-01-15 DIAGNOSIS — E78.00 PURE HYPERCHOLESTEROLEMIA: ICD-10-CM

## 2023-01-17 RX ORDER — ATORVASTATIN CALCIUM 20 MG/1
20 TABLET, FILM COATED ORAL DAILY
Qty: 60 TABLET | Refills: 1 | Status: SHIPPED | OUTPATIENT
Start: 2023-01-17 | End: 2023-03-31

## 2023-01-17 NOTE — TELEPHONE ENCOUNTER
No new care gaps identified.  [facility] Embedded Care Gaps. Reference number: [id number]. 1/17/2023   12:20:52 PM CST

## 2023-01-24 ENCOUNTER — OFFICE VISIT (OUTPATIENT)
Dept: INTERNAL MEDICINE | Facility: CLINIC | Age: 78
End: 2023-01-24
Payer: MEDICARE

## 2023-01-24 VITALS
BODY MASS INDEX: 30.47 KG/M2 | HEART RATE: 61 BPM | OXYGEN SATURATION: 97 % | WEIGHT: 237.44 LBS | HEIGHT: 74 IN | DIASTOLIC BLOOD PRESSURE: 66 MMHG | SYSTOLIC BLOOD PRESSURE: 126 MMHG

## 2023-01-24 DIAGNOSIS — E78.00 PURE HYPERCHOLESTEROLEMIA: Chronic | ICD-10-CM

## 2023-01-24 DIAGNOSIS — E83.52 HYPERCALCEMIA: ICD-10-CM

## 2023-01-24 DIAGNOSIS — Z12.5 ENCOUNTER FOR PROSTATE CANCER SCREENING: ICD-10-CM

## 2023-01-24 DIAGNOSIS — M89.8X9 EXOSTOSIS: ICD-10-CM

## 2023-01-24 DIAGNOSIS — E73.9 LACTOSE INTOLERANCE: ICD-10-CM

## 2023-01-24 DIAGNOSIS — R14.3 EXCESSIVE FLATUS: ICD-10-CM

## 2023-01-24 DIAGNOSIS — I10 ESSENTIAL HYPERTENSION: Primary | ICD-10-CM

## 2023-01-24 DIAGNOSIS — G47.33 OSA ON CPAP: Chronic | ICD-10-CM

## 2023-01-24 DIAGNOSIS — E66.09 CLASS 1 OBESITY DUE TO EXCESS CALORIES WITH SERIOUS COMORBIDITY AND BODY MASS INDEX (BMI) OF 30.0 TO 30.9 IN ADULT: ICD-10-CM

## 2023-01-24 DIAGNOSIS — R68.2 DRY MOUTH: ICD-10-CM

## 2023-01-24 DIAGNOSIS — H04.129 DRY EYE: ICD-10-CM

## 2023-01-24 PROCEDURE — 3074F PR MOST RECENT SYSTOLIC BLOOD PRESSURE < 130 MM HG: ICD-10-PCS | Mod: HCNC,CPTII,S$GLB, | Performed by: INTERNAL MEDICINE

## 2023-01-24 PROCEDURE — 99999 PR PBB SHADOW E&M-EST. PATIENT-LVL IV: ICD-10-PCS | Mod: PBBFAC,HCNC,, | Performed by: INTERNAL MEDICINE

## 2023-01-24 PROCEDURE — 3074F SYST BP LT 130 MM HG: CPT | Mod: HCNC,CPTII,S$GLB, | Performed by: INTERNAL MEDICINE

## 2023-01-24 PROCEDURE — 99499 UNLISTED E&M SERVICE: CPT | Mod: HCNC,S$GLB,, | Performed by: INTERNAL MEDICINE

## 2023-01-24 PROCEDURE — 1101F PR PT FALLS ASSESS DOC 0-1 FALLS W/OUT INJ PAST YR: ICD-10-PCS | Mod: HCNC,CPTII,S$GLB, | Performed by: INTERNAL MEDICINE

## 2023-01-24 PROCEDURE — 3288F FALL RISK ASSESSMENT DOCD: CPT | Mod: HCNC,CPTII,S$GLB, | Performed by: INTERNAL MEDICINE

## 2023-01-24 PROCEDURE — 3288F PR FALLS RISK ASSESSMENT DOCUMENTED: ICD-10-PCS | Mod: HCNC,CPTII,S$GLB, | Performed by: INTERNAL MEDICINE

## 2023-01-24 PROCEDURE — 1160F PR REVIEW ALL MEDS BY PRESCRIBER/CLIN PHARMACIST DOCUMENTED: ICD-10-PCS | Mod: HCNC,CPTII,S$GLB, | Performed by: INTERNAL MEDICINE

## 2023-01-24 PROCEDURE — 1101F PT FALLS ASSESS-DOCD LE1/YR: CPT | Mod: HCNC,CPTII,S$GLB, | Performed by: INTERNAL MEDICINE

## 2023-01-24 PROCEDURE — 3078F PR MOST RECENT DIASTOLIC BLOOD PRESSURE < 80 MM HG: ICD-10-PCS | Mod: HCNC,CPTII,S$GLB, | Performed by: INTERNAL MEDICINE

## 2023-01-24 PROCEDURE — 99499 RISK ADDL DX/OHS AUDIT: ICD-10-PCS | Mod: HCNC,S$GLB,, | Performed by: INTERNAL MEDICINE

## 2023-01-24 PROCEDURE — 1159F PR MEDICATION LIST DOCUMENTED IN MEDICAL RECORD: ICD-10-PCS | Mod: HCNC,CPTII,S$GLB, | Performed by: INTERNAL MEDICINE

## 2023-01-24 PROCEDURE — 3078F DIAST BP <80 MM HG: CPT | Mod: HCNC,CPTII,S$GLB, | Performed by: INTERNAL MEDICINE

## 2023-01-24 PROCEDURE — 1160F RVW MEDS BY RX/DR IN RCRD: CPT | Mod: HCNC,CPTII,S$GLB, | Performed by: INTERNAL MEDICINE

## 2023-01-24 PROCEDURE — 99214 OFFICE O/P EST MOD 30 MIN: CPT | Mod: HCNC,S$GLB,, | Performed by: INTERNAL MEDICINE

## 2023-01-24 PROCEDURE — 99999 PR PBB SHADOW E&M-EST. PATIENT-LVL IV: CPT | Mod: PBBFAC,HCNC,, | Performed by: INTERNAL MEDICINE

## 2023-01-24 PROCEDURE — 1159F MED LIST DOCD IN RCRD: CPT | Mod: HCNC,CPTII,S$GLB, | Performed by: INTERNAL MEDICINE

## 2023-01-24 PROCEDURE — 1126F PR PAIN SEVERITY QUANTIFIED, NO PAIN PRESENT: ICD-10-PCS | Mod: HCNC,CPTII,S$GLB, | Performed by: INTERNAL MEDICINE

## 2023-01-24 PROCEDURE — 99214 PR OFFICE/OUTPT VISIT, EST, LEVL IV, 30-39 MIN: ICD-10-PCS | Mod: HCNC,S$GLB,, | Performed by: INTERNAL MEDICINE

## 2023-01-24 PROCEDURE — 1126F AMNT PAIN NOTED NONE PRSNT: CPT | Mod: HCNC,CPTII,S$GLB, | Performed by: INTERNAL MEDICINE

## 2023-01-24 NOTE — PATIENT INSTRUCTIONS
Weight control  For Diet   - Try the DASH and/or the Mediterranean Diet  - DASH- https://www.nhlbi.nih.gov/health-topics/dash-eating-plan  - Mediterranean - https://www.healthline.com/nutrition/mediterranean-diet-meal-plan

## 2023-01-24 NOTE — PROGRESS NOTES
Assessment:       1. Essential hypertension    2. Pure hypercholesterolemia    3. JOE on CPAP    4. Excessive flatus    5. Hypercalcemia    6. Dry eye    7. Exostosis    8. Dry mouth    9. Encounter for prostate cancer screening    10. Lactose intolerance    11. Class 1 obesity due to excess calories with serious comorbidity and body mass index (BMI) of 30.0 to 30.9 in adult          Plan:         Juan was seen today for follow-up.    Diagnoses and all orders for this visit:    Essential hypertension  new disease state  I reviewed the natural history of the disorder and possible complications  Reviewed the consequence of non-adherence to treatment regiment  I have reviewed lifestyle modification to achieve/maintain goals  I have reviewed the Risk/benefit and side effects of medications  We will proceed with medications as listed below  Reviewed goals for tx  reviewed signs and symptoms that should prompt return to provider or evaluation in the ED   Patient will follow up in 2 weeks          Pure hypercholesterolemia  -     Lipid Panel; Standing    JOE on CPAP    Excessive flatus  -     PSA, Screening; Standing  -     HIV 1/2 Ag/Ab (4th Gen); Standing  -     RPR; Standing  -     TSH; Standing    Hypercalcemia  -     PSA, Screening; Standing  -     HIV 1/2 Ag/Ab (4th Gen); Standing  -     RPR; Standing  -     TSH; Standing  -     Calcium, Ionized; Standing  -     Comprehensive Metabolic Panel; Standing    Dry eye  -     PSA, Screening; Standing  -     HIV 1/2 Ag/Ab (4th Gen); Standing  -     RPR; Standing  -     TSH; Standing  -     Calcium, Ionized; Standing  -     Comprehensive Metabolic Panel; Standing    Exostosis  -     PSA, Screening; Standing  -     HIV 1/2 Ag/Ab (4th Gen); Standing    Dry mouth  -     PSA, Screening; Standing  -     HIV 1/2 Ag/Ab (4th Gen); Standing    Encounter for prostate cancer screening  -     PSA, Screening; Standing    Lactose intolerance    Class 1 obesity due to excess calories with  "serious comorbidity and body mass index (BMI) of 30.0 to 30.9 in adult      Recheck lipids in 6 months  Recheck calcium   Try daily cleaning CONTACTSSand fu with eye doctor  Remove cheese from diet        Subjective:       Patient ID: Juan Neves is a 77 y.o. male.    Chief Complaint: Follow-up        Interim Hx      NONE    Concerns today    Still has dry eye, does wear contacts, and changes every 3-5 days.Recently seen by dentist. Sjogrens labs negative     Excessive flatus, he has history fo LI, and he does eat cheese      He is not using CAPP       Chronic problems       HPI    Review of Systems   All other systems reviewed and are negative.          Health Maintenance Due   Topic Date Due    COVID-19 Vaccine (4 - Booster for Pfizer series) 11/30/2021         Objective:     /66 (BP Location: Right arm, Patient Position: Sitting, BP Method: Medium (Manual))   Pulse 61   Ht 6' 2" (1.88 m)   Wt 107.7 kg (237 lb 7 oz)   SpO2 97%   BMI 30.48 kg/m²     Vitals 1/24/2023 1/10/2023 12/21/2021 8/24/2020 7/20/2020   Height 74 74 74 - 74   Weight (lbs) 237.44 235.45 221.4 208 208.11   BMI (kg/m2) 30.5 30.2 28.4 - 26.7           ASCVD  The 10-year ASCVD risk score (Nicola DK, et al., 2019) is: 25.6%    Values used to calculate the score:      Age: 77 years      Sex: Male      Is Non- : No      Diabetic: No      Tobacco smoker: No      Systolic Blood Pressure: 126 mmHg      Is BP treated: No      HDL Cholesterol: 52 mg/dL      Total Cholesterol: 206 mg/dL    Basic Labs    BMP  Lab Results   Component Value Date     01/10/2023    K 5.0 01/10/2023     01/10/2023    CO2 27 01/10/2023    BUN 16 01/10/2023    CREATININE 0.9 01/10/2023    CALCIUM 10.6 (H) 01/10/2023    ANIONGAP 9 01/10/2023    ESTGFRAFRICA >60.0 12/21/2021    EGFRNONAA >60.0 12/21/2021     Lab Results   Component Value Date    EGFRNORACEVR >60.0 01/10/2023       Lab Results   Component Value Date    ALT 28 " 01/10/2023    AST 23 01/10/2023    ALKPHOS 92 01/10/2023    BILITOT 0.6 01/10/2023         Lab Results   Component Value Date    TSH 3.130 03/15/2018     Lab Results   Component Value Date    WBC 5.65 12/21/2021    HGB 15.5 12/21/2021    HCT 46.3 12/21/2021    MCV 83 12/21/2021     12/21/2021       STD  No results found for: HIV1X2, [id number]  No results found for: RPR  Lab Results   Component Value Date    HEPCAB Non-reactive 01/10/2023     No results found for: LABNGO, LABCHLA        Lipids  Lab Results   Component Value Date    CHOL 206 (H) 01/10/2023     Lab Results   Component Value Date    HDL 52 01/10/2023     Lab Results   Component Value Date    LDLCALC 131.2 01/10/2023     Lab Results   Component Value Date    TRIG 114 01/10/2023     Lab Results   Component Value Date    CHOLHDL 25.2 01/10/2023         PSA  PSA, Screen (ng/mL)   Date Value   12/21/2021 0.79          Physical Exam  Vitals and nursing note reviewed.   Constitutional:       General: He is not in acute distress.     Appearance: He is well-developed. He is not ill-appearing, toxic-appearing or diaphoretic.   HENT:      Head: Normocephalic.   Eyes:      Conjunctiva/sclera: Conjunctivae normal.   Cardiovascular:      Rate and Rhythm: Normal rate and regular rhythm.      Heart sounds: Normal heart sounds. No murmur heard.    No friction rub. No gallop.   Pulmonary:      Effort: Pulmonary effort is normal. No respiratory distress.   Abdominal:      General: There is no distension.      Palpations: Abdomen is soft.   Neurological:      General: No focal deficit present.      Mental Status: He is alert and oriented to person, place, and time.           Future Appointments   Date Time Provider Department Center   2/8/2023 11:20 AM SIENNA [facility]ER[facility]   3/7/2023  1:20 PM Niko Quiroga OD [facility]   7/25/2023  2:00 PM Dillan Welsh III, MD [facility]         Medication List with Changes/Refills    Current Medications    ATORVASTATIN (LIPITOR) 20 MG TABLET    Take 1 tablet (20 mg total) by mouth once daily.    FLUTICASONE (FLONASE) 50 MCG/ACTUATION NASAL SPRAY    1 spray by Each Nare route once daily.         Disclaimer:  This note has been generated using voice-recognition software. There may be grammatical or spelling errors that have been missed during proof-reading

## 2023-01-31 ENCOUNTER — PATIENT MESSAGE (OUTPATIENT)
Dept: INTERNAL MEDICINE | Facility: CLINIC | Age: 78
End: 2023-01-31
Payer: MEDICARE

## 2023-02-07 DIAGNOSIS — Z00.00 ENCOUNTER FOR MEDICARE ANNUAL WELLNESS EXAM: ICD-10-CM

## 2023-02-08 ENCOUNTER — CLINICAL SUPPORT (OUTPATIENT)
Dept: FAMILY MEDICINE | Facility: CLINIC | Age: 78
End: 2023-02-08
Payer: MEDICARE

## 2023-02-08 VITALS — DIASTOLIC BLOOD PRESSURE: 80 MMHG | SYSTOLIC BLOOD PRESSURE: 138 MMHG

## 2023-02-08 DIAGNOSIS — I10 HYPERTENSION, UNSPECIFIED TYPE: Primary | ICD-10-CM

## 2023-02-08 NOTE — PROGRESS NOTES
Pt arrived to clinic for her BP check. Pts BP reading was 138/80. Pt also wanted to check his reading against his personal BP cuff. The reading from his personal cuff was 148/92.

## 2023-02-09 DIAGNOSIS — Z00.00 ENCOUNTER FOR MEDICARE ANNUAL WELLNESS EXAM: ICD-10-CM

## 2023-03-07 ENCOUNTER — OFFICE VISIT (OUTPATIENT)
Dept: OPTOMETRY | Facility: CLINIC | Age: 78
End: 2023-03-07
Payer: MEDICARE

## 2023-03-07 DIAGNOSIS — H52.4 HYPEROPIA WITH PRESBYOPIA OF BOTH EYES: ICD-10-CM

## 2023-03-07 DIAGNOSIS — H25.13 NUCLEAR SCLEROSIS OF BOTH EYES: Primary | ICD-10-CM

## 2023-03-07 DIAGNOSIS — H52.03 HYPEROPIA WITH PRESBYOPIA OF BOTH EYES: ICD-10-CM

## 2023-03-07 DIAGNOSIS — Z46.0 FITTING AND ADJUSTMENT OF SPECTACLES AND CONTACT LENSES: Primary | ICD-10-CM

## 2023-03-07 PROCEDURE — 99999 PR PBB SHADOW E&M-EST. PATIENT-LVL I: ICD-10-PCS | Mod: PBBFAC,HCNC,, | Performed by: OPTOMETRIST

## 2023-03-07 PROCEDURE — 1160F RVW MEDS BY RX/DR IN RCRD: CPT | Mod: HCNC,CPTII,S$GLB, | Performed by: OPTOMETRIST

## 2023-03-07 PROCEDURE — 1159F MED LIST DOCD IN RCRD: CPT | Mod: HCNC,CPTII,S$GLB, | Performed by: OPTOMETRIST

## 2023-03-07 PROCEDURE — 92015 PR REFRACTION: ICD-10-PCS | Mod: HCNC,S$GLB,, | Performed by: OPTOMETRIST

## 2023-03-07 PROCEDURE — 1101F PR PT FALLS ASSESS DOC 0-1 FALLS W/OUT INJ PAST YR: ICD-10-PCS | Mod: HCNC,CPTII,S$GLB, | Performed by: OPTOMETRIST

## 2023-03-07 PROCEDURE — 1160F PR REVIEW ALL MEDS BY PRESCRIBER/CLIN PHARMACIST DOCUMENTED: ICD-10-PCS | Mod: HCNC,CPTII,S$GLB, | Performed by: OPTOMETRIST

## 2023-03-07 PROCEDURE — 1126F AMNT PAIN NOTED NONE PRSNT: CPT | Mod: HCNC,CPTII,S$GLB, | Performed by: OPTOMETRIST

## 2023-03-07 PROCEDURE — 99999 PR PBB SHADOW E&M-EST. PATIENT-LVL I: CPT | Mod: PBBFAC,HCNC,, | Performed by: OPTOMETRIST

## 2023-03-07 PROCEDURE — 3288F PR FALLS RISK ASSESSMENT DOCUMENTED: ICD-10-PCS | Mod: HCNC,CPTII,S$GLB, | Performed by: OPTOMETRIST

## 2023-03-07 PROCEDURE — 92014 COMPRE OPH EXAM EST PT 1/>: CPT | Mod: HCNC,S$GLB,, | Performed by: OPTOMETRIST

## 2023-03-07 PROCEDURE — 99999 PR PBB SHADOW E&M-EST. PATIENT-LVL II: ICD-10-PCS | Mod: PBBFAC,HCNC,, | Performed by: OPTOMETRIST

## 2023-03-07 PROCEDURE — 99999 PR PBB SHADOW E&M-EST. PATIENT-LVL II: CPT | Mod: PBBFAC,HCNC,, | Performed by: OPTOMETRIST

## 2023-03-07 PROCEDURE — 1101F PT FALLS ASSESS-DOCD LE1/YR: CPT | Mod: HCNC,CPTII,S$GLB, | Performed by: OPTOMETRIST

## 2023-03-07 PROCEDURE — 92015 DETERMINE REFRACTIVE STATE: CPT | Mod: HCNC,S$GLB,, | Performed by: OPTOMETRIST

## 2023-03-07 PROCEDURE — 1159F PR MEDICATION LIST DOCUMENTED IN MEDICAL RECORD: ICD-10-PCS | Mod: HCNC,CPTII,S$GLB, | Performed by: OPTOMETRIST

## 2023-03-07 PROCEDURE — 92310 PR CONTACT LENS FITTING (NO CHANGE): ICD-10-PCS | Mod: CSM,,, | Performed by: OPTOMETRIST

## 2023-03-07 PROCEDURE — 3288F FALL RISK ASSESSMENT DOCD: CPT | Mod: HCNC,CPTII,S$GLB, | Performed by: OPTOMETRIST

## 2023-03-07 PROCEDURE — 92310 CONTACT LENS FITTING OU: CPT | Mod: CSM,,, | Performed by: OPTOMETRIST

## 2023-03-07 PROCEDURE — 1126F PR PAIN SEVERITY QUANTIFIED, NO PAIN PRESENT: ICD-10-PCS | Mod: HCNC,CPTII,S$GLB, | Performed by: OPTOMETRIST

## 2023-03-07 PROCEDURE — 92014 PR EYE EXAM, EST PATIENT,COMPREHESV: ICD-10-PCS | Mod: HCNC,S$GLB,, | Performed by: OPTOMETRIST

## 2023-03-07 RX ORDER — INFLUENZA A VIRUS A/VICTORIA/2570/2019 IVR-215 (H1N1) ANTIGEN (FORMALDEHYDE INACTIVATED), INFLUENZA A VIRUS A/DARWIN/6/2021 IVR-227 (H3N2) ANTIGEN (FORMALDEHYDE INACTIVATED), INFLUENZA B VIRUS B/AUSTRIA/1359417/2021 BVR-26 ANTIGEN (FORMALDEHYDE INACTIVATED), INFLUENZA B VIRUS B/PHUKET/3073/2013 BVR-1B ANTIGEN (FORMALDEHYDE INACTIVATED) 15; 15; 15; 15 UG/.5ML; UG/.5ML; UG/.5ML; UG/.5ML
INJECTION, SUSPENSION INTRAMUSCULAR
COMMUNITY
Start: 2023-01-10 | End: 2023-09-27 | Stop reason: ALTCHOICE

## 2023-03-07 NOTE — PROGRESS NOTES
KARISSA    CLIFTON: [birth year]  Chief complaint (CC): Patient is here for annual eye exam today. Patient   has noticed more trouble with reading with the contacts. Distance vision   seems fine.  Patient hasn't noticed any vision changes with his  glasses.  Glasses? +3-4 yrs.   Contacts? -  H/o eye surgery, injections or laser: -  H/o eye injury: -  Known eye conditions? See above  Family h/o eye conditions? -  Eye gtts? AT's  several times at night      (-) Flashes (-)  Floaters (-) Mucous   (-)  Tearing (-) Itching (-) Burning   (-) Headaches (-) Eye Pain/discomfort (-) Irritation   (-)  Redness (-) Double vision (-) Blurry vision    Diabetic? -  A1c? -      Last edited by Brunilda Patel on 3/7/2023  1:38 PM.            Assessment /Plan     For exam results, see Encounter Report.      Nuclear sclerosis of both eyes  Nuclear sclerotic cataract - not visually significant. Observe.    Hyperopia with presbyopia of both eyes  ClRx and SRx released to patient. Patient educated on lens options. Normal ocular health. RTC 1 year for routine exam.

## 2023-03-09 ENCOUNTER — PES CALL (OUTPATIENT)
Dept: ADMINISTRATIVE | Facility: OTHER | Age: 78
End: 2023-03-09
Payer: MEDICARE

## 2023-03-16 ENCOUNTER — PATIENT MESSAGE (OUTPATIENT)
Dept: OPTOMETRY | Facility: CLINIC | Age: 78
End: 2023-03-16
Payer: MEDICARE

## 2023-08-24 DIAGNOSIS — E78.00 PURE HYPERCHOLESTEROLEMIA: ICD-10-CM

## 2023-08-24 RX ORDER — ATORVASTATIN CALCIUM 20 MG/1
20 TABLET, FILM COATED ORAL
Qty: 90 TABLET | Refills: 1 | Status: SHIPPED | OUTPATIENT
Start: 2023-08-24 | End: 2024-03-31

## 2023-08-24 NOTE — TELEPHONE ENCOUNTER
No care due was identified.  [facility] Embedded Care Due Messages. Reference number: [id number].   8/24/2023 2:55:39 AM CDT

## 2023-08-24 NOTE — TELEPHONE ENCOUNTER
Refill Decision Note   Juan Neves  is requesting a refill authorization.  Brief Assessment and Rationale for Refill:  Approve     Medication Therapy Plan:         Comments:     Note composed:8:47 AM 08/24/2023             Appointments     Last Visit   1/24/2023 Dillan Welsh III, MD   Next Visit   11/1/2023 Dillan Welsh III, MD

## 2023-09-26 ENCOUNTER — TELEPHONE (OUTPATIENT)
Dept: ADMINISTRATIVE | Facility: CLINIC | Age: 78
End: 2023-09-26
Payer: MEDICARE

## 2023-09-26 NOTE — TELEPHONE ENCOUNTER
Called pt, no answer; left message informing pt I was calling to remind pt of his in office EAWV on 9/27/23 and to return my call if he had any questions; left my name and number.

## 2023-09-27 ENCOUNTER — OFFICE VISIT (OUTPATIENT)
Dept: FAMILY MEDICINE | Facility: CLINIC | Age: 78
End: 2023-09-27
Payer: MEDICARE

## 2023-09-27 VITALS
HEART RATE: 60 BPM | HEIGHT: 74 IN | DIASTOLIC BLOOD PRESSURE: 68 MMHG | OXYGEN SATURATION: 97 % | SYSTOLIC BLOOD PRESSURE: 130 MMHG | WEIGHT: 234.56 LBS | BODY MASS INDEX: 30.1 KG/M2

## 2023-09-27 DIAGNOSIS — Z00.00 ENCOUNTER FOR MEDICARE ANNUAL WELLNESS EXAM: ICD-10-CM

## 2023-09-27 DIAGNOSIS — E78.00 PURE HYPERCHOLESTEROLEMIA: Chronic | ICD-10-CM

## 2023-09-27 DIAGNOSIS — E66.9 OBESITY (BMI 30.0-34.9): ICD-10-CM

## 2023-09-27 DIAGNOSIS — Z00.00 ENCOUNTER FOR PREVENTIVE HEALTH EXAMINATION: Primary | ICD-10-CM

## 2023-09-27 PROCEDURE — 1159F MED LIST DOCD IN RCRD: CPT | Mod: CPTII,S$GLB,, | Performed by: NURSE PRACTITIONER

## 2023-09-27 PROCEDURE — 3078F PR MOST RECENT DIASTOLIC BLOOD PRESSURE < 80 MM HG: ICD-10-PCS | Mod: CPTII,S$GLB,, | Performed by: NURSE PRACTITIONER

## 2023-09-27 PROCEDURE — 3288F PR FALLS RISK ASSESSMENT DOCUMENTED: ICD-10-PCS | Mod: CPTII,S$GLB,, | Performed by: NURSE PRACTITIONER

## 2023-09-27 PROCEDURE — 1160F PR REVIEW ALL MEDS BY PRESCRIBER/CLIN PHARMACIST DOCUMENTED: ICD-10-PCS | Mod: CPTII,S$GLB,, | Performed by: NURSE PRACTITIONER

## 2023-09-27 PROCEDURE — 3078F DIAST BP <80 MM HG: CPT | Mod: CPTII,S$GLB,, | Performed by: NURSE PRACTITIONER

## 2023-09-27 PROCEDURE — G0439 PR MEDICARE ANNUAL WELLNESS SUBSEQUENT VISIT: ICD-10-PCS | Mod: S$GLB,,, | Performed by: NURSE PRACTITIONER

## 2023-09-27 PROCEDURE — 99999 PR PBB SHADOW E&M-EST. PATIENT-LVL III: CPT | Mod: PBBFAC,,, | Performed by: NURSE PRACTITIONER

## 2023-09-27 PROCEDURE — 1101F PT FALLS ASSESS-DOCD LE1/YR: CPT | Mod: CPTII,S$GLB,, | Performed by: NURSE PRACTITIONER

## 2023-09-27 PROCEDURE — 3288F FALL RISK ASSESSMENT DOCD: CPT | Mod: CPTII,S$GLB,, | Performed by: NURSE PRACTITIONER

## 2023-09-27 PROCEDURE — G0439 PPPS, SUBSEQ VISIT: HCPCS | Mod: S$GLB,,, | Performed by: NURSE PRACTITIONER

## 2023-09-27 PROCEDURE — 1126F AMNT PAIN NOTED NONE PRSNT: CPT | Mod: CPTII,S$GLB,, | Performed by: NURSE PRACTITIONER

## 2023-09-27 PROCEDURE — 1170F FXNL STATUS ASSESSED: CPT | Mod: CPTII,S$GLB,, | Performed by: NURSE PRACTITIONER

## 2023-09-27 PROCEDURE — 1101F PR PT FALLS ASSESS DOC 0-1 FALLS W/OUT INJ PAST YR: ICD-10-PCS | Mod: CPTII,S$GLB,, | Performed by: NURSE PRACTITIONER

## 2023-09-27 PROCEDURE — 3075F SYST BP GE 130 - 139MM HG: CPT | Mod: CPTII,S$GLB,, | Performed by: NURSE PRACTITIONER

## 2023-09-27 PROCEDURE — 1160F RVW MEDS BY RX/DR IN RCRD: CPT | Mod: CPTII,S$GLB,, | Performed by: NURSE PRACTITIONER

## 2023-09-27 PROCEDURE — 99999 PR PBB SHADOW E&M-EST. PATIENT-LVL III: ICD-10-PCS | Mod: PBBFAC,,, | Performed by: NURSE PRACTITIONER

## 2023-09-27 PROCEDURE — 3075F PR MOST RECENT SYSTOLIC BLOOD PRESS GE 130-139MM HG: ICD-10-PCS | Mod: CPTII,S$GLB,, | Performed by: NURSE PRACTITIONER

## 2023-09-27 PROCEDURE — 1170F PR FUNCTIONAL STATUS ASSESSED: ICD-10-PCS | Mod: CPTII,S$GLB,, | Performed by: NURSE PRACTITIONER

## 2023-09-27 PROCEDURE — 1159F PR MEDICATION LIST DOCUMENTED IN MEDICAL RECORD: ICD-10-PCS | Mod: CPTII,S$GLB,, | Performed by: NURSE PRACTITIONER

## 2023-09-27 PROCEDURE — 1126F PR PAIN SEVERITY QUANTIFIED, NO PAIN PRESENT: ICD-10-PCS | Mod: CPTII,S$GLB,, | Performed by: NURSE PRACTITIONER

## 2023-09-27 NOTE — PROGRESS NOTES
"  Juan Neves presented for a  Medicare AWV and comprehensive Health Risk Assessment today. The following components were reviewed and updated:    Medical history  Family History  Social history  Allergies and Current Medications  Health Risk Assessment  Health Maintenance  Care Team         ** See Completed Assessments for Annual Wellness Visit within the encounter summary.**         The following assessments were completed:  Living Situation  CAGE  Depression Screening  Timed Get Up and Go  Whisper Test  Cognitive Function Screening        Nutrition Screening  ADL Screening  PAQ Screening        Vitals:    09/27/23 1310   BP: 130/68   BP Location: Right arm   Patient Position: Sitting   BP Method: Large (Manual)   Pulse: 60   SpO2: 97%   Weight: 106.4 kg (234 lb 9.1 oz)   Height: 6' 2" (1.88 m)     Body mass index is 30.12 kg/m².    Physical Exam  Vitals reviewed.   Constitutional:       General: He is not in acute distress.     Appearance: Normal appearance. He is well-developed and well-groomed.   HENT:      Head: Normocephalic.   Cardiovascular:      Rate and Rhythm: Normal rate.   Pulmonary:      Effort: Pulmonary effort is normal. No respiratory distress.   Skin:     General: Skin is warm and dry.      Coloration: Skin is not pale.   Neurological:      Mental Status: He is alert and oriented to person, place, and time.   Psychiatric:         Attention and Perception: Attention normal.         Mood and Affect: Mood and affect normal.         Speech: Speech normal.         Behavior: Behavior normal. Behavior is cooperative.               Diagnoses and health risks identified today and associated recommendations/orders:    1. Encounter for preventive health examination  Pain level assessed and reviewed. Not taking any opioids; at low risk for opioid use disorder. Follow up with PCP.    2. Pure hypercholesterolemia  Chronic; stable on medication. Follow up with PCP.    3. Obesity (BMI 30.0-34.9)  Eat a low " salt/low fat diet and discussed importance of engaging in physical activity at least 5x/week for a minimum of 30 min/day.    4. Encounter for Medicare annual wellness exam  - Ambulatory Referral/Consult to Enhanced Annual Wellness Visit (eAWV)      Provided Juan with a 5-10 year written screening schedule and personal prevention plan. Recommendations were developed using the USPSTF age appropriate recommendations. Education, counseling, and referrals were provided as needed. After Visit Summary given to patient which includes a list of additional screenings/tests needed.    Follow up for your next annual wellness visit.    Octavia Alexandra NP      Advance Care Planning     I offered to discuss advanced care planning, including how to pick a person who would make decisions for you if you were unable to make them for yourself, called a health care power of , and what kind of decisions you might make such as use of life sustaining treatments such as ventilators and tube feeding when faced with a life limiting illness recorded on a living will that they will need to know. (How you want to be cared for as you near the end of your natural life)     X Patient is interested in learning more about how to make advanced directives. I provided them paperwork and offered to discuss this with them.

## 2023-09-27 NOTE — PATIENT INSTRUCTIONS
Counseling and Referral of Other Preventative  (Italic type indicates deductible and co-insurance are waived)    Patient Name: Juan Neves  Today's Date: 9/27/2023    Health Maintenance       Date Due Completion Date    Influenza Vaccine (1) 06/30/2024 (Originally 9/1/2023) 1/10/2023    TETANUS VACCINE 08/04/2026 8/4/2016    Colonoscopy 01/23/2027 1/23/2017    Lipid Panel 01/10/2028 1/10/2023        No orders of the defined types were placed in this encounter.      The following information is provided to all patients.  This information is to help you find resources for any of the problems found today that may be affecting your health:                Living healthy guide: www.[facility].[place].gov      Understanding Diabetes: www.diabetes.org      Eating healthy: www.cdc.gov/healthyweight      CDC home safety checklist: www.cdc.gov/steadi/patient.html      Agency on Aging: www.goea.[place].[facility]      Alcoholics anonymous (AA): www.aa.org      Physical Activity: www.nathaly.nih.gov/iw8vjwg      Tobacco use: www.quitwithusla.org

## 2023-09-28 PROBLEM — E66.9 OBESITY (BMI 30.0-34.9): Status: ACTIVE | Noted: 2023-09-28

## 2023-09-28 PROBLEM — E66.811 OBESITY (BMI 30.0-34.9): Status: ACTIVE | Noted: 2023-09-28

## 2023-10-03 ENCOUNTER — OFFICE VISIT (OUTPATIENT)
Dept: INTERNAL MEDICINE | Facility: CLINIC | Age: 78
End: 2023-10-03
Payer: MEDICARE

## 2023-10-03 VITALS
OXYGEN SATURATION: 98 % | DIASTOLIC BLOOD PRESSURE: 70 MMHG | SYSTOLIC BLOOD PRESSURE: 134 MMHG | HEART RATE: 58 BPM | WEIGHT: 236.75 LBS | HEIGHT: 74 IN | BODY MASS INDEX: 30.38 KG/M2

## 2023-10-03 DIAGNOSIS — E78.00 PURE HYPERCHOLESTEROLEMIA: Primary | Chronic | ICD-10-CM

## 2023-10-03 DIAGNOSIS — H00.19 CHALAZION, UNSPECIFIED LATERALITY: ICD-10-CM

## 2023-10-03 DIAGNOSIS — E83.52 HYPERCALCEMIA: ICD-10-CM

## 2023-10-03 DIAGNOSIS — G47.00 INSOMNIA, UNSPECIFIED TYPE: ICD-10-CM

## 2023-10-03 PROCEDURE — 3288F PR FALLS RISK ASSESSMENT DOCUMENTED: ICD-10-PCS | Mod: CPTII,S$GLB,, | Performed by: INTERNAL MEDICINE

## 2023-10-03 PROCEDURE — 1159F MED LIST DOCD IN RCRD: CPT | Mod: CPTII,S$GLB,, | Performed by: INTERNAL MEDICINE

## 2023-10-03 PROCEDURE — 1160F PR REVIEW ALL MEDS BY PRESCRIBER/CLIN PHARMACIST DOCUMENTED: ICD-10-PCS | Mod: CPTII,S$GLB,, | Performed by: INTERNAL MEDICINE

## 2023-10-03 PROCEDURE — 99999 PR PBB SHADOW E&M-EST. PATIENT-LVL III: CPT | Mod: PBBFAC,,, | Performed by: INTERNAL MEDICINE

## 2023-10-03 PROCEDURE — 1159F PR MEDICATION LIST DOCUMENTED IN MEDICAL RECORD: ICD-10-PCS | Mod: CPTII,S$GLB,, | Performed by: INTERNAL MEDICINE

## 2023-10-03 PROCEDURE — 1101F PR PT FALLS ASSESS DOC 0-1 FALLS W/OUT INJ PAST YR: ICD-10-PCS | Mod: CPTII,S$GLB,, | Performed by: INTERNAL MEDICINE

## 2023-10-03 PROCEDURE — 3078F DIAST BP <80 MM HG: CPT | Mod: CPTII,S$GLB,, | Performed by: INTERNAL MEDICINE

## 2023-10-03 PROCEDURE — 99214 PR OFFICE/OUTPT VISIT, EST, LEVL IV, 30-39 MIN: ICD-10-PCS | Mod: S$GLB,,, | Performed by: INTERNAL MEDICINE

## 2023-10-03 PROCEDURE — 3075F PR MOST RECENT SYSTOLIC BLOOD PRESS GE 130-139MM HG: ICD-10-PCS | Mod: CPTII,S$GLB,, | Performed by: INTERNAL MEDICINE

## 2023-10-03 PROCEDURE — 1160F RVW MEDS BY RX/DR IN RCRD: CPT | Mod: CPTII,S$GLB,, | Performed by: INTERNAL MEDICINE

## 2023-10-03 PROCEDURE — 99214 OFFICE O/P EST MOD 30 MIN: CPT | Mod: S$GLB,,, | Performed by: INTERNAL MEDICINE

## 2023-10-03 PROCEDURE — 99999 PR PBB SHADOW E&M-EST. PATIENT-LVL III: ICD-10-PCS | Mod: PBBFAC,,, | Performed by: INTERNAL MEDICINE

## 2023-10-03 PROCEDURE — 1101F PT FALLS ASSESS-DOCD LE1/YR: CPT | Mod: CPTII,S$GLB,, | Performed by: INTERNAL MEDICINE

## 2023-10-03 PROCEDURE — 1125F PR PAIN SEVERITY QUANTIFIED, PAIN PRESENT: ICD-10-PCS | Mod: CPTII,S$GLB,, | Performed by: INTERNAL MEDICINE

## 2023-10-03 PROCEDURE — 1125F AMNT PAIN NOTED PAIN PRSNT: CPT | Mod: CPTII,S$GLB,, | Performed by: INTERNAL MEDICINE

## 2023-10-03 PROCEDURE — 3075F SYST BP GE 130 - 139MM HG: CPT | Mod: CPTII,S$GLB,, | Performed by: INTERNAL MEDICINE

## 2023-10-03 PROCEDURE — 3078F PR MOST RECENT DIASTOLIC BLOOD PRESSURE < 80 MM HG: ICD-10-PCS | Mod: CPTII,S$GLB,, | Performed by: INTERNAL MEDICINE

## 2023-10-03 PROCEDURE — 3288F FALL RISK ASSESSMENT DOCD: CPT | Mod: CPTII,S$GLB,, | Performed by: INTERNAL MEDICINE

## 2023-10-03 RX ORDER — MIRTAZAPINE 15 MG/1
15 TABLET, FILM COATED ORAL NIGHTLY
Qty: 90 TABLET | Refills: 1 | Status: SHIPPED | OUTPATIENT
Start: 2023-10-03 | End: 2024-10-02

## 2023-10-03 NOTE — PROGRESS NOTES
"Assessment:         1. Pure hypercholesterolemia    2. Chalazion, unspecified laterality    3. Hypercalcemia    4. Insomnia, unspecified type          Plan:           Juan was seen today for annual exam.    Diagnoses and all orders for this visit:    Pure hypercholesterolemia  Check labs  Adjust accordingly  chronic  -     Basic Metabolic Panel; Future  -     Hepatic Function Panel; Future  -     Lipid Panel; Future    Chalazion, unspecified laterality  New  Trying compresses    Hypercalcemia  Recheck level    -     Basic Metabolic Panel; Future  -     Hepatic Function Panel; Future  -     PTH, Intact; Future    Insomnia, unspecified type  Chronic  Controlled  Patient is at goal today   I have reviewed lifestyle modification to achieve/maintain goals  We will continue the current medication regimen as listed below  Patient will follow up in 1 year or sooner if needed   -     mirtazapine (REMERON) 15 MG tablet; Take 1 tablet (15 mg total) by mouth every evening.            Subjective:       Patient ID: Juan Neves is a 78 y.o. male.    Chief Complaint: Annual Exam        Interim Hx  Had awv and seen by eye doctor         Concerns today    One month ago bottom eyelid bilaterally with raised bump with  no pain,     Couple months of bilateral foot pain withbony sensation on lateral 5th metatarsal        Chronic problems   Patient Active Problem List   Diagnosis    Insomnia    Dupuytren's contracture of hand    JOE on CPAP    Bilateral bunions    Pure hypercholesterolemia    Lactose intolerance    Obesity (BMI 30.0-34.9)       HPI    Review of Systems   All other systems reviewed and are negative.            There are no preventive care reminders to display for this patient.      Objective:     /70 (BP Location: Right arm, Patient Position: Sitting, BP Method: Medium (Manual))   Pulse (!) 58   Ht 6' 2" (1.88 m)   Wt 107.4 kg (236 lb 12.4 oz)   SpO2 98%   BMI 30.40 kg/m²         10/3/2023     4:06 PM " "9/27/2023     1:10 PM 1/24/2023     3:02 PM 1/10/2023     2:03 PM 12/21/2021    10:21 AM   Vitals   Height 6' 2" (1.88 m) 6' 2" (1.88 m) 6' 2" (1.88 m) 6' 2" (1.88 m) 6' 2" (1.88 m)   Weight (lbs) 236.77 234.57 237.44 235.45 221.4   BMI (kg/m2) 30.4 30.1 30.5 30.2 28.4                Physical Exam  Nursing note reviewed.   Constitutional:       General: He is not in acute distress.     Appearance: Normal appearance. He is not ill-appearing, toxic-appearing or diaphoretic.   HENT:      Head: Normocephalic.   Eyes:      Conjunctiva/sclera: Conjunctivae normal.      Comments: Small round nodule bottom eyelid a few mm in size skin color    Pulmonary:      Effort: Pulmonary effort is normal. No respiratory distress.   Neurological:      General: No focal deficit present.      Mental Status: He is alert and oriented to person, place, and time.   Psychiatric:         Mood and Affect: Mood normal.         Behavior: Behavior normal.         Thought Content: Thought content normal.         Judgment: Judgment normal.                 ASCVD  The 10-year ASCVD risk score (Nicola DK, et al., 2019) is: 29.7%    Values used to calculate the score:      Age: 78 years      Sex: Male      Is Non- : No      Diabetic: No      Tobacco smoker: No      Systolic Blood Pressure: 134 mmHg      Is BP treated: No      HDL Cholesterol: 52 mg/dL      Total Cholesterol: 206 mg/dL    Basic Labs    BMP  Lab Results   Component Value Date     01/10/2023    K 5.0 01/10/2023     01/10/2023    CO2 27 01/10/2023    BUN 16 01/10/2023    CREATININE 0.9 01/10/2023    CALCIUM 10.6 (H) 01/10/2023    ANIONGAP 9 01/10/2023    ESTGFRAFRICA >60.0 12/21/2021    EGFRNONAA >60.0 12/21/2021     Lab Results   Component Value Date    EGFRNORACEVR >60.0 01/10/2023       Lab Results   Component Value Date    ALT 28 01/10/2023    AST 23 01/10/2023    ALKPHOS 92 01/10/2023    BILITOT 0.6 01/10/2023         Lab Results   Component Value " "Date    TSH 3.130 03/15/2018     Lab Results   Component Value Date    WBC 5.65 12/21/2021    HGB 15.5 12/21/2021    HCT 46.3 12/21/2021    MCV 83 12/21/2021     12/21/2021           STD  No results found for: "HIV1X2", "PYJ40LECZ"  No results found for: "RPR"  Lab Results   Component Value Date    HEPCAB Non-reactive 01/10/2023     No results found for: "LABNGO", "LABCHLA"        Lipids  Lab Results   Component Value Date    CHOL 206 (H) 01/10/2023     Lab Results   Component Value Date    HDL 52 01/10/2023     Lab Results   Component Value Date    LDLCALC 131.2 01/10/2023     Lab Results   Component Value Date    TRIG 114 01/10/2023     Lab Results   Component Value Date    CHOLHDL 25.2 01/10/2023         PSA  PSA, Screen (ng/mL)   Date Value   12/21/2021 0.79     Future Appointments   Date Time Provider Department Center   10/12/2023  9:45 AM LAB, [facility]   12/18/2023  4:00 PM Dillan Welsh III, MD [facility]   10/4/2024 11:00 [facility]         Medication List with Changes/Refills   Current Medications    ATORVASTATIN (LIPITOR) 20 MG TABLET    TAKE 1 TABLET EVERY DAY    FLUTICASONE (FLONASE) 50 MCG/ACTUATION NASAL SPRAY    1 spray by Each Nare route once daily.   Changed and/or Refilled Medications    Modified Medication Previous Medication    MIRTAZAPINE (REMERON) 15 MG TABLET MIRTAZAPINE ORAL       Take 1 tablet (15 mg total) by mouth every evening.       Discontinued Medications    MIRTAZAPINE (REMERON) 15 MG TABLET    Take 1 tablet (15 mg total) by mouth every evening.         Disclaimer:  This note has been generated using voice-recognition software. There may be grammatical or spelling errors that have been missed during proof-reading     "

## 2023-10-03 NOTE — PATIENT INSTRUCTIONS
We were happy to see you today    For your Testing  Please have your labs and/or imaging test done  next Thursday         For your Medication   We refilled  For more information about side effects please visit medlineplus.gov        For your Referrals  Please let me know if you want to           Please return to clinic in    No follow-ups on file.        Extra resources   Try the warm compress for the eye  Try wide fitting shows for the feet  Try [employer] shoes , Chrissy, kenyetta

## 2023-10-12 ENCOUNTER — LAB VISIT (OUTPATIENT)
Dept: LAB | Facility: HOSPITAL | Age: 78
End: 2023-10-12
Attending: INTERNAL MEDICINE
Payer: MEDICARE

## 2023-10-12 DIAGNOSIS — E78.00 PURE HYPERCHOLESTEROLEMIA: Chronic | ICD-10-CM

## 2023-10-12 DIAGNOSIS — E83.52 HYPERCALCEMIA: ICD-10-CM

## 2023-10-12 LAB
ALBUMIN SERPL BCP-MCNC: 4 G/DL (ref 3.5–5.2)
ALP SERPL-CCNC: 75 U/L (ref 55–135)
ALT SERPL W/O P-5'-P-CCNC: 20 U/L (ref 10–44)
ANION GAP SERPL CALC-SCNC: 11 MMOL/L (ref 8–16)
AST SERPL-CCNC: 21 U/L (ref 10–40)
BILIRUB DIRECT SERPL-MCNC: 0.2 MG/DL (ref 0.1–0.3)
BILIRUB SERPL-MCNC: 0.5 MG/DL (ref 0.1–1)
BUN SERPL-MCNC: 15 MG/DL (ref 8–23)
CALCIUM SERPL-MCNC: 9.5 MG/DL (ref 8.7–10.5)
CHLORIDE SERPL-SCNC: 105 MMOL/L (ref 95–110)
CHOLEST SERPL-MCNC: 175 MG/DL (ref 120–199)
CHOLEST/HDLC SERPL: 3.6 {RATIO} (ref 2–5)
CO2 SERPL-SCNC: 23 MMOL/L (ref 23–29)
CREAT SERPL-MCNC: 1 MG/DL (ref 0.5–1.4)
EST. GFR  (NO RACE VARIABLE): >60 ML/MIN/1.73 M^2
GLUCOSE SERPL-MCNC: 90 MG/DL (ref 70–110)
HDLC SERPL-MCNC: 48 MG/DL (ref 40–75)
HDLC SERPL: 27.4 % (ref 20–50)
LDLC SERPL CALC-MCNC: 98.4 MG/DL (ref 63–159)
NONHDLC SERPL-MCNC: 127 MG/DL
POTASSIUM SERPL-SCNC: 4.1 MMOL/L (ref 3.5–5.1)
PROT SERPL-MCNC: 7.2 G/DL (ref 6–8.4)
PTH-INTACT SERPL-MCNC: 66.9 PG/ML (ref 9–77)
SODIUM SERPL-SCNC: 139 MMOL/L (ref 136–145)
TRIGL SERPL-MCNC: 143 MG/DL (ref 30–150)

## 2023-10-12 PROCEDURE — 36415 COLL VENOUS BLD VENIPUNCTURE: CPT | Mod: PO | Performed by: INTERNAL MEDICINE

## 2023-10-12 PROCEDURE — 80076 HEPATIC FUNCTION PANEL: CPT | Performed by: INTERNAL MEDICINE

## 2023-10-12 PROCEDURE — 80048 BASIC METABOLIC PNL TOTAL CA: CPT | Performed by: INTERNAL MEDICINE

## 2023-10-12 PROCEDURE — 80061 LIPID PANEL: CPT | Performed by: INTERNAL MEDICINE

## 2023-10-12 PROCEDURE — 83970 ASSAY OF PARATHORMONE: CPT | Performed by: INTERNAL MEDICINE

## 2023-12-18 ENCOUNTER — OFFICE VISIT (OUTPATIENT)
Dept: INTERNAL MEDICINE | Facility: CLINIC | Age: 78
End: 2023-12-18
Payer: MEDICARE

## 2023-12-18 VITALS
WEIGHT: 240.94 LBS | HEART RATE: 58 BPM | HEIGHT: 74 IN | OXYGEN SATURATION: 98 % | BODY MASS INDEX: 30.92 KG/M2 | SYSTOLIC BLOOD PRESSURE: 132 MMHG | DIASTOLIC BLOOD PRESSURE: 80 MMHG

## 2023-12-18 DIAGNOSIS — E78.00 PURE HYPERCHOLESTEROLEMIA: Primary | Chronic | ICD-10-CM

## 2023-12-18 PROCEDURE — 1126F PR PAIN SEVERITY QUANTIFIED, NO PAIN PRESENT: ICD-10-PCS | Mod: HCNC,CPTII,S$GLB, | Performed by: INTERNAL MEDICINE

## 2023-12-18 PROCEDURE — 1160F PR REVIEW ALL MEDS BY PRESCRIBER/CLIN PHARMACIST DOCUMENTED: ICD-10-PCS | Mod: HCNC,CPTII,S$GLB, | Performed by: INTERNAL MEDICINE

## 2023-12-18 PROCEDURE — 99999 PR PBB SHADOW E&M-EST. PATIENT-LVL III: CPT | Mod: PBBFAC,HCNC,, | Performed by: INTERNAL MEDICINE

## 2023-12-18 PROCEDURE — 3288F PR FALLS RISK ASSESSMENT DOCUMENTED: ICD-10-PCS | Mod: HCNC,CPTII,S$GLB, | Performed by: INTERNAL MEDICINE

## 2023-12-18 PROCEDURE — 1160F RVW MEDS BY RX/DR IN RCRD: CPT | Mod: HCNC,CPTII,S$GLB, | Performed by: INTERNAL MEDICINE

## 2023-12-18 PROCEDURE — 3075F SYST BP GE 130 - 139MM HG: CPT | Mod: HCNC,CPTII,S$GLB, | Performed by: INTERNAL MEDICINE

## 2023-12-18 PROCEDURE — 1101F PR PT FALLS ASSESS DOC 0-1 FALLS W/OUT INJ PAST YR: ICD-10-PCS | Mod: HCNC,CPTII,S$GLB, | Performed by: INTERNAL MEDICINE

## 2023-12-18 PROCEDURE — 1126F AMNT PAIN NOTED NONE PRSNT: CPT | Mod: HCNC,CPTII,S$GLB, | Performed by: INTERNAL MEDICINE

## 2023-12-18 PROCEDURE — 1101F PT FALLS ASSESS-DOCD LE1/YR: CPT | Mod: HCNC,CPTII,S$GLB, | Performed by: INTERNAL MEDICINE

## 2023-12-18 PROCEDURE — 3288F FALL RISK ASSESSMENT DOCD: CPT | Mod: HCNC,CPTII,S$GLB, | Performed by: INTERNAL MEDICINE

## 2023-12-18 PROCEDURE — 1159F MED LIST DOCD IN RCRD: CPT | Mod: HCNC,CPTII,S$GLB, | Performed by: INTERNAL MEDICINE

## 2023-12-18 PROCEDURE — 99214 PR OFFICE/OUTPT VISIT, EST, LEVL IV, 30-39 MIN: ICD-10-PCS | Mod: HCNC,S$GLB,, | Performed by: INTERNAL MEDICINE

## 2023-12-18 PROCEDURE — 3075F PR MOST RECENT SYSTOLIC BLOOD PRESS GE 130-139MM HG: ICD-10-PCS | Mod: HCNC,CPTII,S$GLB, | Performed by: INTERNAL MEDICINE

## 2023-12-18 PROCEDURE — 99999 PR PBB SHADOW E&M-EST. PATIENT-LVL III: ICD-10-PCS | Mod: PBBFAC,HCNC,, | Performed by: INTERNAL MEDICINE

## 2023-12-18 PROCEDURE — 3079F DIAST BP 80-89 MM HG: CPT | Mod: HCNC,CPTII,S$GLB, | Performed by: INTERNAL MEDICINE

## 2023-12-18 PROCEDURE — 99214 OFFICE O/P EST MOD 30 MIN: CPT | Mod: HCNC,S$GLB,, | Performed by: INTERNAL MEDICINE

## 2023-12-18 PROCEDURE — 1159F PR MEDICATION LIST DOCUMENTED IN MEDICAL RECORD: ICD-10-PCS | Mod: HCNC,CPTII,S$GLB, | Performed by: INTERNAL MEDICINE

## 2023-12-18 PROCEDURE — 3079F PR MOST RECENT DIASTOLIC BLOOD PRESSURE 80-89 MM HG: ICD-10-PCS | Mod: HCNC,CPTII,S$GLB, | Performed by: INTERNAL MEDICINE

## 2023-12-31 ENCOUNTER — OFFICE VISIT (OUTPATIENT)
Dept: URGENT CARE | Facility: CLINIC | Age: 78
End: 2023-12-31
Payer: MEDICARE

## 2023-12-31 VITALS
TEMPERATURE: 103 F | RESPIRATION RATE: 19 BRPM | HEIGHT: 74 IN | DIASTOLIC BLOOD PRESSURE: 75 MMHG | HEART RATE: 76 BPM | SYSTOLIC BLOOD PRESSURE: 158 MMHG | BODY MASS INDEX: 30.31 KG/M2 | WEIGHT: 236.13 LBS | OXYGEN SATURATION: 96 %

## 2023-12-31 DIAGNOSIS — R05.9 COUGH, UNSPECIFIED TYPE: Primary | ICD-10-CM

## 2023-12-31 DIAGNOSIS — J11.1 INFLUENZA: ICD-10-CM

## 2023-12-31 LAB
CTP QC/QA: YES
CTP QC/QA: YES
POC MOLECULAR INFLUENZA A AGN: POSITIVE
POC MOLECULAR INFLUENZA B AGN: NEGATIVE
SARS-COV-2 AG RESP QL IA.RAPID: NEGATIVE

## 2023-12-31 PROCEDURE — 99214 PR OFFICE/OUTPT VISIT, EST, LEVL IV, 30-39 MIN: ICD-10-PCS | Mod: S$GLB,,, | Performed by: FAMILY MEDICINE

## 2023-12-31 PROCEDURE — 87811 SARS-COV-2 COVID19 W/OPTIC: CPT | Mod: QW,S$GLB,, | Performed by: FAMILY MEDICINE

## 2023-12-31 PROCEDURE — 87502 POCT INFLUENZA A/B MOLECULAR: ICD-10-PCS | Mod: QW,S$GLB,, | Performed by: FAMILY MEDICINE

## 2023-12-31 PROCEDURE — 87502 INFLUENZA DNA AMP PROBE: CPT | Mod: QW,S$GLB,, | Performed by: FAMILY MEDICINE

## 2023-12-31 PROCEDURE — 99214 OFFICE O/P EST MOD 30 MIN: CPT | Mod: S$GLB,,, | Performed by: FAMILY MEDICINE

## 2023-12-31 PROCEDURE — 87811 SARS CORONAVIRUS 2 ANTIGEN POCT, MANUAL READ: ICD-10-PCS | Mod: QW,S$GLB,, | Performed by: FAMILY MEDICINE

## 2023-12-31 RX ORDER — BENZONATATE 100 MG/1
200 CAPSULE ORAL 3 TIMES DAILY PRN
Qty: 30 CAPSULE | Refills: 1 | Status: SHIPPED | OUTPATIENT
Start: 2023-12-31

## 2023-12-31 RX ORDER — OSELTAMIVIR PHOSPHATE 75 MG/1
75 CAPSULE ORAL 2 TIMES DAILY
Qty: 10 CAPSULE | Refills: 0 | Status: SHIPPED | OUTPATIENT
Start: 2023-12-31 | End: 2024-01-05

## 2023-12-31 NOTE — PROGRESS NOTES
"Subjective:      Patient ID: Juan Neves is a 78 y.o. male.    Vitals:  height is 6' 2" (1.88 m) and weight is 107.1 kg (236 lb 1.8 oz). His tympanic temperature is 103.2 °F (39.6 °C) (abnormal). His blood pressure is 158/75 (abnormal) and his pulse is 76. His respiration is 19 and oxygen saturation is 96%.     Chief Complaint: Cough    This is a 78 y.o. male who presents today with a chief complaint of coughing yellowish/clear/red sputum, nausea, fever, nasal/chest congestion, chills, started yesterday  Pt has taken OTC Mucinex  Fever this am 102  Vaccinated  '    Cough  This is a new problem. The current episode started yesterday. The problem has been gradually worsening. The problem occurs constantly. The cough is Productive of sputum. Associated symptoms include chills, a fever, postnasal drip and sweats. Pertinent negatives include no chest pain, ear congestion, ear pain, headaches, heartburn, hemoptysis, myalgias, nasal congestion, rash, rhinorrhea, sore throat, shortness of breath, weight loss or wheezing. Treatments tried: OTC Mucinex. The treatment provided mild relief. There is no history of asthma, bronchiectasis, bronchitis, COPD, emphysema, environmental allergies or pneumonia.       Constitution: Positive for chills and fever.   HENT:  Positive for postnasal drip. Negative for ear pain and sore throat.    Cardiovascular:  Negative for chest pain.   Respiratory:  Positive for cough. Negative for bloody sputum, shortness of breath and wheezing.    Gastrointestinal:  Negative for heartburn.   Musculoskeletal:  Negative for muscle ache.   Skin:  Negative for rash.   Allergic/Immunologic: Negative for environmental allergies.   Neurological:  Negative for headaches.      Objective:     Physical Exam   Constitutional: He is oriented to person, place, and time. He appears well-developed. He is cooperative.  Non-toxic appearance. He does not appear ill. No distress.   HENT:   Head: Normocephalic and " atraumatic.   Ears:   Right Ear: Hearing, tympanic membrane, external ear and ear canal normal.   Left Ear: Hearing, tympanic membrane, external ear and ear canal normal.   Nose: Nose normal. No mucosal edema, rhinorrhea or nasal deformity. No epistaxis. Right sinus exhibits no maxillary sinus tenderness and no frontal sinus tenderness. Left sinus exhibits no maxillary sinus tenderness and no frontal sinus tenderness.   Mouth/Throat: Uvula is midline, oropharynx is clear and moist and mucous membranes are normal. Mucous membranes are moist. No trismus in the jaw. Normal dentition. No uvula swelling. No posterior oropharyngeal erythema. Oropharynx is clear.   Eyes: Conjunctivae and lids are normal. Pupils are equal, round, and reactive to light. Right eye exhibits no discharge. Left eye exhibits no discharge. No scleral icterus.   Neck: Trachea normal and phonation normal. Neck supple.   Cardiovascular: Normal rate, regular rhythm, normal heart sounds and normal pulses.   Pulmonary/Chest: Effort normal and breath sounds normal. No respiratory distress.   Abdominal: Normal appearance and bowel sounds are normal. He exhibits no distension and no mass. Soft. There is no abdominal tenderness.   Musculoskeletal: Normal range of motion.         General: No deformity. Normal range of motion.   Neurological: He is alert and oriented to person, place, and time. He exhibits normal muscle tone. Coordination normal.   Skin: Skin is warm, dry, intact, not diaphoretic and not pale.   Psychiatric: His speech is normal and behavior is normal. Judgment and thought content normal.   Nursing note and vitals reviewed.      Assessment:     1. Cough, unspecified type    2. Influenza        Plan:       Cough, unspecified type  -     SARS Coronavirus 2 Antigen, POCT Manual Read  -     POCT Influenza A/B MOLECULAR    Influenza    Other orders  -     oseltamivir (TAMIFLU) 75 MG capsule; Take 1 capsule (75 mg total) by mouth 2 (two) times  daily. for 5 days  Dispense: 10 capsule; Refill: 0  -     benzonatate (TESSALON PERLES) 100 MG capsule; Take 2 capsules (200 mg total) by mouth 3 (three) times daily as needed for Cough.  Dispense: 30 capsule; Refill: 1           Results for orders placed or performed in visit on 12/31/23   SARS Coronavirus 2 Antigen, POCT Manual Read   Result Value Ref Range    SARS Coronavirus 2 Antigen Negative Negative     Acceptable Yes    POCT Influenza A/B MOLECULAR   Result Value Ref Range    POC Molecular Influenza A Ag Positive (A) Negative, Not Reported    POC Molecular Influenza B Ag Negative Negative, Not Reported     Acceptable Yes            Tylenol or ibuprofen 2 po q 6 hours prn fever

## 2024-03-25 ENCOUNTER — PATIENT MESSAGE (OUTPATIENT)
Dept: INTERNAL MEDICINE | Facility: CLINIC | Age: 79
End: 2024-03-25
Payer: MEDICARE

## 2024-03-25 DIAGNOSIS — G47.33 OSA ON CPAP: Primary | Chronic | ICD-10-CM

## 2024-03-26 ENCOUNTER — TELEPHONE (OUTPATIENT)
Dept: INTERNAL MEDICINE | Facility: CLINIC | Age: 79
End: 2024-03-26
Payer: MEDICARE

## 2024-04-12 ENCOUNTER — OFFICE VISIT (OUTPATIENT)
Dept: SLEEP MEDICINE | Facility: CLINIC | Age: 79
End: 2024-04-12
Attending: PSYCHIATRY & NEUROLOGY
Payer: MEDICARE

## 2024-04-12 VITALS
WEIGHT: 233.81 LBS | BODY MASS INDEX: 30.01 KG/M2 | HEIGHT: 74 IN | SYSTOLIC BLOOD PRESSURE: 128 MMHG | HEART RATE: 68 BPM | DIASTOLIC BLOOD PRESSURE: 70 MMHG

## 2024-04-12 DIAGNOSIS — G47.30 SLEEP APNEA, UNSPECIFIED TYPE: Primary | ICD-10-CM

## 2024-04-12 DIAGNOSIS — G47.33 OSA ON CPAP: Chronic | ICD-10-CM

## 2024-04-12 PROCEDURE — 3074F SYST BP LT 130 MM HG: CPT | Mod: CPTII,S$GLB,, | Performed by: PSYCHIATRY & NEUROLOGY

## 2024-04-12 PROCEDURE — 3288F FALL RISK ASSESSMENT DOCD: CPT | Mod: CPTII,S$GLB,, | Performed by: PSYCHIATRY & NEUROLOGY

## 2024-04-12 PROCEDURE — 99999 PR PBB SHADOW E&M-EST. PATIENT-LVL III: CPT | Mod: PBBFAC,,, | Performed by: PSYCHIATRY & NEUROLOGY

## 2024-04-12 PROCEDURE — 3078F DIAST BP <80 MM HG: CPT | Mod: CPTII,S$GLB,, | Performed by: PSYCHIATRY & NEUROLOGY

## 2024-04-12 PROCEDURE — 1101F PT FALLS ASSESS-DOCD LE1/YR: CPT | Mod: CPTII,S$GLB,, | Performed by: PSYCHIATRY & NEUROLOGY

## 2024-04-12 PROCEDURE — 99204 OFFICE O/P NEW MOD 45 MIN: CPT | Mod: S$GLB,,, | Performed by: PSYCHIATRY & NEUROLOGY

## 2024-04-12 PROCEDURE — 1126F AMNT PAIN NOTED NONE PRSNT: CPT | Mod: CPTII,S$GLB,, | Performed by: PSYCHIATRY & NEUROLOGY

## 2024-04-12 NOTE — PATIENT INSTRUCTIONS
ill stop Mirtazapine for a week     Ordering a sleep study      PLEASE BRING YOUR SLEEP AID TO THE sleep study  SLEEP LAB ([facility]) will contact you to schedulethe sleep study. Their number is [phone] (ext 2). Please call them if you do not hear from them in 2 weeks from now.  The [facility] is located on 7th floor of the [facility] (for home and in lab studies); [facility] [facility] is located in [facility] ( in lab studies only).    SLEEP CLINIC (my assistant) will call you when the sleep study results are ready or I will message you through the portal with the results as we have discussed - if you have not heard from us by 2 weeks from the date of the study, or you can use [facility] to contact me.    Our clinic phone number is [phone] (ext 1)     ______________________________________________    Will message Dr. Welsh if need to test for rheumatologic causes of dryness.  You can try Beam (beverage with Melatonin); CBD (but not THC), or another prescription sleep aid if we had to stop Mirtazapine.

## 2024-04-12 NOTE — PROGRESS NOTES
ADDENDUM 05/16/2024  Margarita Adams MA Lysenko, Liudmila, MD  Yes, we can put him on the books to scheduled. Thank you for asking.  DEISI Avila          Previous Messages       ----- Message -----  From: Marina Emerson MD  Sent: 5/5/2024   8:40 PM CDT  To: Abner Aditi M Staff    Hello,    Would  you consider this patient an [facility] candidate?  BMI 29.9; AHI 45; he had some central apneas, but over 50% of apneas were obstructive.  Previously tried and failed CPAP.    Please let us know.    Thank you!          4/9/2024     6:01 PM 2/27/2020     4:00 PM   EPWORTH SLEEPINESS SCALE TOTAL SCORE    Total score 11 5       Juan Neves is a 79 y.o. male seen today for the  follow up. Last seen on 3/7/2016.    Stopped using CPAP since his last visit with me in 2016 - stopepd using APAP  Was diagnosed with severe JOE aAHI 55 back in the day.  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 MAy be Mirtazapine but may be oral drying from JOE>     WEnt through [facility] with Dr. Chavez.    I will order full night PSG - just for baselineAfter that - will send to [facility] clinic  If not a candidate - will do CPAP titration  Will stop Mirtazapne for a week     Wants to stry [facility]      Patient ID: [id number]... Juan Neves Compliance Summary 6/3/2020 - 7/2/2020 (30 days) Days with Device Usage 10 days Days without Device Usage 20 days Percent Days with Device Usage 33.3% Cumulative Usage 1 day 23 hrs. 48 mins. 45 secs. Maximum Usage (1 Day) 6 hrs. 58 mins. 31 secs. Average Usage (All Days) 1 hrs. 35 mins. 37 secs. Average Usage (Days Used) 4 hrs. 46 mins. 52 secs. Minimum Usage (1 Day) 2 hrs. 40 mins. 22 secs. Percent of Days with Usage >= 4 Hours 26.7% Percent of Days with Usage < 4 Hours 73.3% Date Range Total Blower Time 1 day 23 hrs. 48 mins. 50 secs. Average AHI 10.3 Auto-CPAP Summary Auto-CPAP Mean Pressure 7.4 cmH2O  Auto-CPAP Peak Average Pressure 8.2 cmH2O Device Pressure <= 90% of Time 8.5 cmH2O Average Time in Large Leak Per Day 1 hrs. 48 mins. 48 secs. Device Settings as of 7/2/2020 AutoCPAP - A-Flex Device Settings Device Mode Parameter Value Min Pressure 7 cmH2O Max Pressure 15 cmH2O A-Flex Setting 3 Auto Off Off Auto On Off View Optional Screens On Ramp Type Linear Ramp Time 20 minutes Ramp Start Pressure 5.0 cmH2O Mask Resistance 1 Mask Resistance Lock Off Tubing Type 15 HT Tubing Type Lock On Opti-Start Off EZ-Start Disabled Tube Temperature 3 Humidifier 4 Humidification Mode on Heated Tube Disconnect       Sleep studies:       ________________________________________________   Juan Neves  was seen at the request of  Dillan Welsh III, MD for sleep evaluation.    06/22/2015 INITIAL HISTORY OF PRESENT ILLNESS:  Juan Neves is a 79 y.o. male is here to be evaluated for a sleep disorder.       CHIEF COMPLAINT:      The patient's complaints include occasional  excessive daytime sleepiness, excessive daytime fatigue, snoring,  Very rare witnessed breathing pauses and interrupted sleep since  At least 30 years ago.      He has difficulty staying asleep and going back to sleep    The patient has mentioned difficulty falling and staying asleep.    The patient states that he has already made some changes in regard to sleep hygiene, making sure that the bedroom is dark, features comfortable temperature and humidity level. The patient does not watch TV and read in bed. he avoids going to bed before he is sleepy and gets out of bed if not asleep within 30 minutes. he avoids clock watching and tries not to worry about his ability to fall asleep. He drinks coffee in AM, no coke, no tea. Exercises 4 days a week. He reads in the evening, stays on his computer reading till 10:30-11 PM. Then brushes his teeth and goes to bed. Showers in the early evening.      The patient has tried the following sleeping aids: Ambien - only  worked 4 hrs, Alteril - did not work.    Reports  dry mouth and sore throat  Denies nasal congestion - using Flonase  Denies  morning headaches  Reports occasional  interrupted sleep  Denies frequent leg movements  Denies symptoms concerning for parasomnia    The ESS (Watkins Sleepiness Score) taken on initial visit is 5 /24    The patient never had tonsillectomy, adenoidectomy or UPPP     Ibuprofen PM, Xanax in the middle of the night    INTERVAL HISTORY:    01/04/2016  The patient has not presented any new complaints since the previous visit. Comes to discuss PSG.   Still interrupted sleep. Both Sonata and ZZquill did not help with sleep continuity.    03/07/2016:     He had nasal septum surgery in the beginning of March.  He stopped using CPAP prior. States machine was keeping him up and had vertigo for around 1 min on awakening. Sonata did not help sleep continuity, Lunesta got denied by insurance. Dry nose+. Still having difficulty staying asleep.     CPAP pressure: 8-18 cm H2O  Mask comfort / fit:  Nabila - leaks    Pressure tolerance: OK  Humidification: ok   CPAP Interrogation:    Ave daily usage:5 hours /7days  Ave daily usage:5 hours /30days  Days >4 hours usage: 75%   Machine condition: good     90-%tile pressure: 9-11 cm cm H2O  Large leak 5 min  AHI 5-10  \        SLEEP ROUTINE AND LIFESTYLE 04/12/2024 :    Occupation:retired    Bed partner: his wife  Time to bed: 11-12  Sleep onset latency: not long  Disruptions or awakenings: 1-2  Time to fall back into sleep: hours to never  Wakeup time:6-7 AM   Perceived sleep quality: 2/5  Perceived total sleep time:  2/5  hours.  Daytime naps: less than 1  Weekend sleep routine: same  Exercise routine: yes  Caffeine: soda once in a while; no caf after 4 PM     PREVIOUS SLEEP STUDIES:       SPLIT NIGHT STUDY on 10/21/15: Significant JOE (Obstructive Sleep Apnea) with AHI of 24.9 hour and SaO2 doug of 81% ([id number]). Adequate control of respiratory events was  reached at 9 cm H2O CPAP.      DME:       PAST MEDICAL HISTORY:    Active Ambulatory Problems     Diagnosis Date Noted    Insomnia 08/13/2012    Dupuytren's contracture of hand 11/30/2016    JOE on CPAP 01/11/2017    Bilateral bunions 03/01/2018    Pure hypercholesterolemia 09/19/2018    Lactose intolerance 01/24/2023    Obesity (BMI 30.0-34.9) 09/28/2023     Resolved Ambulatory Problems     Diagnosis Date Noted    BPPV (benign paroxysmal positional vertigo) 08/13/2012    Encounter for preventive health examination 04/18/2013    Cervical strain 08/19/2013    Anxiety disorder 08/19/2013    Atypical pneumonia 01/30/2014    Nasal obstruction 03/01/2016    Screening for colon cancer 01/23/2017     Past Medical History:   Diagnosis Date    Allergy     Anxiety     Cataract     Hyperlipidemia     Sleep apnea                 PAST SURGICAL HISTORY:    Past Surgical History:   Procedure Laterality Date    COLONOSCOPY      COLONOSCOPY N/A 01/23/2017    Procedure: COLONOSCOPY;  Surgeon: NASEEM Iqbal MD;  Location: [facility];  Service: Endoscopy;  Laterality: N/A;    EXCISION TURBINATE, SUBMUCOUS      NASAL RECONSTRUCTION      with bilateral  grafts    NASAL SEPTUM SURGERY           FAMILY HISTORY:                Family History   Problem Relation Age of Onset    Diabetes Mother     Cancer Father     Diabetes Sister     Cancer Sister     Acne Daughter     No Known Problems Daughter     Coronary artery disease Neg Hx     Heart disease Neg Hx     Melanoma Neg Hx     Psoriasis Neg Hx     Eczema Neg Hx     Lupus Neg Hx        SOCIAL HISTORY:          Tobacco:   Social History     Tobacco Use   Smoking Status Former    Types: Cigars    Passive exposure: Never   Smokeless Tobacco Never   Tobacco Comments    Pt smokes cigars periodically (6x's a year)       alcohol use:    Social History     Substance and Sexual Activity   Alcohol Use Yes    Alcohol/week: 8.0 standard drinks of alcohol    Types: 3 Glasses of wine, 1 Cans of  "beer, 4 Shots of liquor per week    Comment:  ( 1 glass of wine and 1 scotch)                   ALLERGIES:    Review of patient's allergies indicates:   Allergen Reactions    Pcn [penicillins] Other (See Comments)     pt. reports having a "blackout"       CURRENT MEDICATIONS:    Current Outpatient Medications   Medication Sig Dispense Refill    atorvastatin (LIPITOR) 20 MG tablet TAKE 1 TABLET EVERY DAY 90 tablet 1    benzonatate (TESSALON PERLES) 100 MG capsule Take 2 capsules (200 mg total) by mouth 3 (three) times daily as needed for Cough. 30 capsule 1    fluticasone (FLONASE) 50 mcg/actuation nasal spray 1 spray by Each Nare route once daily. 3 Bottle 3    mirtazapine (REMERON) 15 MG tablet Take 1 tablet (15 mg total) by mouth every evening. 90 tablet 1     No current facility-administered medications for this visit.                      REVIEW OF SYSTEMS:   Sleep related symptoms as per HPI    denies weight gain  Denies dyspnea  Denies palpitations  Reports acid reflux   Denies polyuria  Denies  mood diturbance  Denies  anemia  Reports occasional  muscle pain  Denies  Gait imbalance    Otherwise, a balance of 10 systems reviewed is negative.    PHYSICAL EXAM:  /70 (BP Location: Left arm, Patient Position: Sitting, BP Method: Large (Automatic))   Pulse 68   Ht 6' 2" (1.88 m)   Wt 106.1 kg (233 lb 12.8 oz)   BMI 30.02 kg/m²   GENERAL: Overweight body habitus, well groomed.  HEENT:   HEENT:  Conjunctivae are non-erythematous; Pupils equal, round, and reactive to light; Nose is symmetrical; Nasal mucosa is pink and moist; Septum is midline; Inferior turbinates are hypertrophied; Nasal airflow is normal; Posterior pharynx is pink; Modified Mallampati:III-IV; Posterior palate is low; Tonsils +3; Uvula is normal and pink;Tongue is enlarged; Dentition is fair; No TMJ tenderness; Jaw opening and protrusion without click and without discomfort.  NECK: Supple. Neck circumference is 16 inches. No thyromegaly. No " "palpable nodes.     SKIN: On face and neck: No abrasions, no rashes, no lesions.  No subcutaneous nodules are palpable.  RESPIRATORY: Chest is clear to auscultation.  Normal chest expansion and non-labored breathing at rest.  CARDIOVASCULAR: Normal S1, S2.  No murmurs, gallops or rubs. No carotid bruits bilaterally.  No edema. No clubbing. No cyanosis.    NEURO: Oriented to time, place and person. Normal attention span and concentration. Gait normal.    PSYCH: Affect is full. Mood is normal  MUSCULOSKELETAL: Moves 4 extremities. Gait normal.         Using [facility]: Yes      ASSESSMENT:    1. JOE (obstructive sleep apnea). The patient symptomatically has  excessive daytime sleepiness, snoring,  witnessed breathing pauses, excessive daytime fatigue, gasping for air in sleep and interrupted sleep  with exam findings of "a crowded oral airway and elevated body mass index.  This warrants treatmnet  2. Sleep maintenance insomnia. Insomnia NEC. Multi-factorial -  excess time in bed, poor sleep hygiene,JOE  and likely paradoxical insomnia play a role      PLAN:        I will order full night PSG - just for baselineAfter that - will send to [facility]  If not a candidate - will do CPAP titration  Will stop Mirtazapne for a week     ------    CPAP change from  CPAP 8-18 cm  To 9-12 with the mask of a patient's choice was given to the patient. Consider Dream wear.  Will provide rx for Lunesta generic this time.  Stop using computer 2-3 hrs before bedtime or use f.lux - consider orange glasses (blue light fileters) from [employer]  OK to mix Lunesta with Zquill  DO not use Lunesta and Sonata on the same night  Yoga/relaxation at night  Software for relaxation for the phone (augenic training, progressive muscle relaxation, meditation for sleep)        Education: During our discussion today, we talked about the etiology of obstructive sleep apnea as well as the potential ramifications of untreated sleep apnea, which could " include daytime sleepiness, hypertension, heart disease and/or stroke.      We discussed potential treatment options, which could include weight loss, body positioning, continuous positive airway pressure (CPAP), or referral for surgical consideration. The patient preferred CPAP option.     Discussed purpose of PAP therapy, health benefits of CPAP, as well as the potential ramifications of untreated sleep apnea, which could include daytime sleepiness, hypertension, heart disease and/or stroke. An AHI of 15 is associated with increased risk CVD.      She should avoid ETOH and sedatives at night, as it tends to aggravate JOE. Regular replacement of CPAP mask, tubing and filter was recommended.    Precautions: The patient was advised to abstain from driving should he feel sleepy or drowsy.    Follow up: MD/NP after 1 month of PAP use.  He would like to have ENT consult - concerned if he will be even able to tolerate CPAP.    Thank you for allowing me the opportunity to participate in the care of your patient.

## 2024-04-26 ENCOUNTER — TELEPHONE (OUTPATIENT)
Dept: SLEEP MEDICINE | Facility: OTHER | Age: 79
End: 2024-04-26
Payer: MEDICARE

## 2024-04-27 ENCOUNTER — HOSPITAL ENCOUNTER (OUTPATIENT)
Dept: SLEEP MEDICINE | Facility: HOSPITAL | Age: 79
Discharge: HOME | End: 2024-04-27
Attending: PSYCHIATRY & NEUROLOGY
Payer: MEDICARE

## 2024-04-27 DIAGNOSIS — G47.33 OSA (OBSTRUCTIVE SLEEP APNEA): Primary | ICD-10-CM

## 2024-04-27 DIAGNOSIS — G47.30 SLEEP APNEA, UNSPECIFIED TYPE: ICD-10-CM

## 2024-04-27 PROCEDURE — 95810 POLYSOM 6/> YRS 4/> PARAM: CPT

## 2024-04-27 NOTE — Clinical Note
Hello,  Would  you consider this patient an [facility] candidate? BMI 29.9; AHI 45; he had some central apneas, but over 50% of apneas were obstructive. Previously tried and failed CPAP.  Please let us know.  Thank you!

## 2024-04-28 PROBLEM — G47.30 SLEEP APNEA: Status: ACTIVE | Noted: 2017-01-11

## 2024-04-28 NOTE — PROGRESS NOTES
Patient was educated about the purpose of the wires and what data was being collected. Patient was informed that the technician may need to enter the room during the night to fix leads or make adjustments to the equipment.                    Follow up instructions were provided to the patient.

## 2024-05-05 ENCOUNTER — PATIENT MESSAGE (OUTPATIENT)
Dept: SLEEP MEDICINE | Facility: CLINIC | Age: 79
End: 2024-05-05

## 2024-05-05 PROCEDURE — 95810 POLYSOM 6/> YRS 4/> PARAM: CPT | Mod: 26,,, | Performed by: PSYCHIATRY & NEUROLOGY

## 2024-05-06 ENCOUNTER — TELEPHONE (OUTPATIENT)
Dept: OTOLARYNGOLOGY | Facility: CLINIC | Age: 79
End: 2024-05-06
Payer: MEDICARE

## 2024-05-06 NOTE — TELEPHONE ENCOUNTER
----- Message from Marina Emerson MD sent at 5/5/2024  8:40 PM CDT -----  Hello,    Would  you consider this patient an [facility] candidate?  BMI 29.9; AHI 45; he had some central apneas, but over 50% of apneas were obstructive.  Previously tried and failed CPAP.    Please let us know.    Thank you!

## 2024-05-06 NOTE — PROCEDURES
"Diagnostic Report  [facility] - [address], [place] [address]  Phone: [phone]  Fax: [phone]       Patient Name: MEIR CUELLAR Study Date: 4/27/2024   Date of Birth: - Patient MRN:  [id number]   Age:  -     Sex: Male Referring Physician: HERMINIO EMERSON MD   Height: 6' 2" Recording Tech: Chan Cunningham RRT RPSGT   Weight: 233.0 lbs Scoring Tech: Amaury Butler RPSGT   BMI:  29.9 AASM:  1B   AHI: 44.5 Interpreting Physician: Marina Emerson MD   RERA index: 0.5 Low oxygen sat: 64.0%   RDI: 45.3        Polysomnogram Data: A full night polysomnogram recorded the standard physiologic parameters including EEG, EOG, EMG, EKG, nasal and oral airflow.  Respiratory parameters of chest and abdominal movements were recorded with Pe[place]-Crystal motion transducers.  Oxygen saturation was recorded by pulse oximetry.    Sleep architecture: This is a baseline polysomnogram. At light's out, the patient fell asleep in 16.0 minutes and slept for 79.7% of the time. Total sleep time (TST) was 352.0 minutes. 40.3% of TST was in Stage N1 sleep, 0.0% TST in slow wave sleep, and 3.7% TST in REM sleep. The REM latency was 144.0 minutes. Sleep architecture was significantly disrupted due to underlying sleep apnea.     Respiratory: Mild to moderate snoring was present. The polysomnogram revealed a presence of 46 obstructive, 28 central, and - mixed apneas resulting in a Total Apnea index of 12.6 events per hour.  There were 187 hypopneas resulting in a Total Hypopnea index of 31.9 events per hour.  The combined Apnea/Hypopnea index was 44.5 events per hour.  There were a total of 3 RERA events resulting in a Respiratory Disturbance Index (RDI) of 45.0 events per hour.  Mean oxygen saturation was 92.7%.  The lowest oxygen saturation during sleep was 70.0%.  Time spent ?88% oxygen saturation was - minutes (-).The patient did not qualify for a split night study due to an insufficient number of events in the first " "half of the study.    Motor movement / Parasomnia: There were no significant  limb movements of sleep noted. The total limb movement index was - (- with arousal).     Cardiac: Cardiac rhythm monitoring revealed a sinus rhythm  with occasional PAC's and PVC's. The average pulse rate was 51.5 bpm.  The minimum pulse rate was 42.0 bpm while the maximum pulse rate was 82.0 bpm.      Patient perception: On a post-sleep study questionnaire, the patient indicated that sleep was the same in the lab than compared to home.    IMPRESSION:  Obstructive Sleep Apnea (G47.33),  severe based on AHI criteria.  Central apneas were also present; less tahn 50% of apneas were central.     RECOMMENDATION:    CPAP titration study, if the patient is interested in this treatment modality.  In the interim, the patient should avoid supine position sleep, since sleep disordered breathing was most prevalent in this sleeping position.        I have reviewed the attached data report and the raw data tracings of this study epoch-by-epoch and have determined that the recording quality and scoring of events are sufficient to allow for interpretation and electronically signed by:      Marina Emerson MD 4/27/2024                              [facility]    Diagnostic PSG Report    Patient Name: EMIR CUELLAR Study Date: 4/27/2024   Date of Birth: - MRN #:  [id number]   Age: - CHIKIS #: -   Sex: Male Referring Provider: HERMINIO EMERSON MD   Height: 6' 2" Recording Tech: Chan Cunningham RRT RPSGT   Weight: 233.0 lbs Scoring Tech: Amaury Butler RRT RPSGT   BMI: 29.9 Interpreting Physician: Marina Emerson MD   ESS: - Neck Circumference: -     Study Overview    Lights Off: 09:53:06 PM  Count Index   Lights On: 05:14:45 AM Awakenings: 64 10.9   Time in Bed: 441.6 min. Arousals: 217 37.0   Total Sleep Time: 352.0 min. Apneas & Hypopneas: 261 44.5    Sleep Efficiency: 79.7% Limb Movements: - -   Sleep Latency: 16.0 min. Snores: - -   Wake " After Sleep Onset: 73.5 min. Desaturations: 243 41.4    REM Latency from Sleep Onset: 144.0 min. Minimum SpO2 TST: 70.0%        Sleep Architecture   % of Time in Bed    Stages Time (mins) % Sleep Time   Wake 90.5    Stage N1 142.0 40.3%   Stage N2 197.0 56.0%   Stage N3 0.0 0.0%   REM 13.0 3.7%         Arousal Summary     NREM REM Sleep Index   Respiratory Arousals 65 - 65 11.1   PLM Arousals - - - -   Isolated Limb Movement Arousals - - - -   Spontaneous Arousals 151 1 152 25.9   Total 216 1 217 37.0       Limb Movement Summary     Count Index   Isolated Limb Movements - -   Periodic Limb Movements (PLMs) - -   Total Limb Movements - -         Respiratory Summary     By Sleep Stage By Body Position Total    NREM REM Supine Non-Supine    Time (min) 339.0 13.0 295.5 56.5 352.0           Obstructive Apnea 41 5 44 2 46   Mixed Apnea - - - - -   Central Apnea 28 - 22 6 28   Total Apneas 69 5 66 8 74   Total Apnea Index 12.2 23.1 13.4 8.5 12.6           Hypopnea 176 11 182 5 187   Hypopnea Index 31.2 50.8 37.0 5.3 31.9           Apnea & Hypopnea 245 16 248 13 261   Apnea & Hypopnea Index 43.4 73.8 50.4 13.8 44.5           RERAs 3 - 2 1 3   RERA Index 0.5 - 0.4 1.1 0.5           RDI 43.9 73.8 50.8 14.9 45.0      Scoring Criteria: Hypopneas scored at Choose an item.% desaturation criteria.    Respiratory Event Durations     Apnea Hypopnea    NREM REM NREM REM   Average (seconds) 21.8 23.2 23.4 25.8   Maximum (seconds) 51.6 40.5 60.3 73.0       Oxygen Saturation Summary     Wake NREM REM TST TIB   Average SpO2 93.3% 92.8% 85.5% 92.5% 92.7%   Minimum SpO2 64.0% 73.0% 70.0% 70.0% 64.0%   Maximum SpO2 100.0% 100.0% 98.0% 100.0% 100.0%       Oxygen Saturation Distribution    Range (%) Time in range (min) Time in range (%)   90.0 - 100.0 347.3 79.5%   80.0 - 90.0 77.1 17.6%   70.0 - 80.0 12.0 2.7%   60.0 - 70.0 0.6 0.1%   50.0 - 60.0 - -   0.0 - 50.0 - -   Time Spent ?88% SpO2    Range (%) Time in range (min) Time in range (%)    0.0 - 88.0 62.2 14.2%          Count Index   Desaturations 243 41.4      Cardiac Summary     Wake NREM REM Sleep Total   Average Pulse Rate (BPM) 53.3 50.8 57.1 51.1 51.5   Minimum Pulse Rate (BPM) 43.0 42.0 47.0 42.0 42.0   Maximum Pulse Rate (BPM) 82.0 68.0 71.0 71.0 82.0     Pulse Rate Distribution    Range (bpm) Time in range (min) Time in range (%)   0.0 - 40.0 - -   40.0 - 60.0 426.8 97.7%   60.0 - 80.0 10.1 2.3%   80.0 - 100.0 0.0 0.0%   100.0 - 120.0 - -   120.0 - 140.0 - -   140.0 - 200.0 - -     EtCO2 Summary    Stage Min (mmHg) Average (mmHg) Max (mmHg)   Wake - - -   NREM(1+2+3) - - -   REM - - -     Range (mmHg) Time in range (min) Time in range (%)   20.0 - 40.0 - -   40.0 - 50.0 - -   50.0 - 55.0 - -   55.0 - 100.0 - -   Excluded data <20.0 & >65.0 442.5 100.0%     TcCO2 Summary    Stage Min (mmHg) Average (mmHg) Max (mmHg)   Wake - - -   NREM(1+2+3) - - -   REM - - -     Range (mmHg) Time in range (min) Time in range (%)   - - -   - - -   - - -   - - -   - - -     Comments    Patient was educated about the purpose of the wires and what data was being collected. Patient was informed that the technician may need to enter the room during the night to fix leads or make adjustments to the equipment. Respiratory events were observed throughout the study. Patient was not placed on cpap per physician orders.         EKG appears to be Bradycardia/frequent PVC'S/rare PAC'S                    Follow up instructions were provided to the patient.

## 2024-05-23 NOTE — PROGRESS NOTES
KARISSA LAZO 07/2017  Wears contacts comfort good, but intermediate distance is   not as good as it used to be. Glasses about 2 yrs. Old, mainly wears at   night after removing contacts.  Using AT's at least 3 or 4 times a day.     Last edited by Brunilda Patel on 8/7/2019  9:10 AM. (History)            Assessment /Plan     For exam results, see Encounter Report.    Nuclear sclerosis of both eyes    Hyperopia with presbyopia of both eyes      1. Educated pt on presence of cataracts and effects on vision. No surgery at this time. Recheck in one year.  2. Contact lens Rx released to pt. Daily wear only advised, with education to risks of extended wear.  Discussed lens care, compliance and solutions. RTC yearly contact lens follow up.                  
Clear

## 2024-06-19 ENCOUNTER — OFFICE VISIT (OUTPATIENT)
Dept: OTOLARYNGOLOGY | Facility: CLINIC | Age: 79
End: 2024-06-19
Payer: MEDICARE

## 2024-06-19 VITALS
SYSTOLIC BLOOD PRESSURE: 143 MMHG | HEIGHT: 74 IN | WEIGHT: 222.75 LBS | HEART RATE: 53 BPM | DIASTOLIC BLOOD PRESSURE: 79 MMHG | BODY MASS INDEX: 28.59 KG/M2

## 2024-06-19 DIAGNOSIS — G47.33 OSA (OBSTRUCTIVE SLEEP APNEA): Primary | ICD-10-CM

## 2024-06-19 DIAGNOSIS — Z78.9 DIFFICULTY WITH CPAP USE: ICD-10-CM

## 2024-06-19 PROCEDURE — 3078F DIAST BP <80 MM HG: CPT | Mod: CPTII,S$GLB,, | Performed by: OTOLARYNGOLOGY

## 2024-06-19 PROCEDURE — 1126F AMNT PAIN NOTED NONE PRSNT: CPT | Mod: CPTII,S$GLB,, | Performed by: OTOLARYNGOLOGY

## 2024-06-19 PROCEDURE — 99203 OFFICE O/P NEW LOW 30 MIN: CPT | Mod: S$GLB,,, | Performed by: OTOLARYNGOLOGY

## 2024-06-19 PROCEDURE — 1101F PT FALLS ASSESS-DOCD LE1/YR: CPT | Mod: CPTII,S$GLB,, | Performed by: OTOLARYNGOLOGY

## 2024-06-19 PROCEDURE — 3288F FALL RISK ASSESSMENT DOCD: CPT | Mod: CPTII,S$GLB,, | Performed by: OTOLARYNGOLOGY

## 2024-06-19 PROCEDURE — 3077F SYST BP >= 140 MM HG: CPT | Mod: CPTII,S$GLB,, | Performed by: OTOLARYNGOLOGY

## 2024-06-19 RX ORDER — SODIUM CHLORIDE 0.9 % (FLUSH) 0.9 %
10 SYRINGE (ML) INJECTION
Status: DISCONTINUED | OUTPATIENT
Start: 2024-06-19 | End: 2024-06-24 | Stop reason: CLARIF

## 2024-06-19 RX ORDER — LIDOCAINE HYDROCHLORIDE 10 MG/ML
1 INJECTION, SOLUTION EPIDURAL; INFILTRATION; INTRACAUDAL; PERINEURAL ONCE
Status: CANCELLED | OUTPATIENT
Start: 2024-06-19 | End: 2024-06-19

## 2024-06-19 NOTE — PROGRESS NOTES
OTOLARYNGOLOGY CLINIC NOTE  Date:  06/19/2024     Chief complaint:  Chief Complaint   Patient presents with    Sleep Apnea     Inspire consult        History of Present Illness  Juan Neves is a 79 y.o. male  presenting today for a new evaluation and treatment of suresh  Has tried cpap in the past  No tcurrenntly doing anything for tx of suresh     Tried several masks ; at times would be getting so much air it would wake him up. Was never able to get to 4 hours with mask on.     Has issues staying asleep but not falling asleep. Wakes up between 3 to 5 am .     + snoring. Pauses in breathing when sleeping. Sometimes has nocturia but not consistently. Does not feel well rested after sleep     2015 no baseline central 11 tx induced    Has had septoplaty before     Xylimelts has tried does mouth spray       Had no baseline central psg in 22015  only tx induced   Past Medical History  Past Medical History:   Diagnosis Date    Allergy     Anxiety     Cataract     Hyperlipidemia     Insomnia 2012    Sleep apnea         Past Surgical History  Past Surgical History:   Procedure Laterality Date    COLONOSCOPY      COLONOSCOPY N/A 01/23/2017    Procedure: COLONOSCOPY;  Surgeon: NASEEM Iqbal MD;  Location: [facility];  Service: Endoscopy;  Laterality: N/A;    EXCISION TURBINATE, SUBMUCOUS      NASAL RECONSTRUCTION      with bilateral  grafts    NASAL SEPTUM SURGERY          Medications  Current Outpatient Medications on File Prior to Visit   Medication Sig Dispense Refill    atorvastatin (LIPITOR) 20 MG tablet TAKE 1 TABLET EVERY DAY 90 tablet 1    fluticasone (FLONASE) 50 mcg/actuation nasal spray 1 spray by Each Nare route once daily. 3 Bottle 3    mirtazapine (REMERON) 15 MG tablet Take 1 tablet (15 mg total) by mouth every evening. 90 tablet 1    benzonatate (TESSALON PERLES) 100 MG capsule Take 2 capsules (200 mg total) by mouth 3 (three) times daily as needed for Cough. 30 capsule 1     No current  "facility-administered medications on file prior to visit.       Review of Systems  Review of Systems   Constitutional:  Positive for malaise/fatigue.   Cardiovascular: Negative.    Gastrointestinal:  Positive for diarrhea and heartburn.   Skin: Negative.    Neurological: Negative.     Answers submitted by the patient for this visit:  Review of Symptoms Questionnaire  (Submitted on 6/12/2024)  postnasal drip: Yes  eye itching: Yes  Snoring?: Yes  Sleep Apnea?: Yes  Acid Reflux?: Yes  Urinating too frequently?: Ye  sleep disturbance: Yes    Social History   reports that he has quit smoking. His smoking use included cigars. He has never been exposed to tobacco smoke. He has never used smokeless tobacco. He reports current alcohol use of about 8.0 standard drinks of alcohol per week. He reports that he does not use drugs.     Family History  Family History   Problem Relation Name Age of Onset    Diabetes Mother Mom     Cancer Father Dad     Diabetes Sister Sis     Cancer Sister Sis     Acne Daughter Nisha     No Known Problems Daughter      Coronary artery disease Neg Hx      Heart disease Neg Hx      Melanoma Neg Hx      Psoriasis Neg Hx      Eczema Neg Hx      Lupus Neg Hx          Physical Exam   Vitals:    06/19/24 0907   BP: (!) 143/79   Pulse: (!) 53    Body mass index is 28.6 kg/m².  Weight: 101.1 kg (222 lb 12.4 oz)   Height: 6' 2" (188 cm)     GENERAL: no acute distress.  HEAD: normocephalic.   EYES: lids and lashes normal. No scleral icterus  EARS: external ear without lesion, normal pinna shape and position.    NOSE: external nose without significant bony abnormality  ORAL CAVITY/OROPHARYNX: tongue midline and mobile. Symmetric palate rise. Uvula midline.   NECK: trachea midline.     RESPIRATORY: no stridor, no stertor. Voice normal. Respirations nonlabored.  NEURO: alert, responds to questions appropriately.   PSYCH:mood appropriate      Imaging:  The patient does not have any pertinent and/or recent imaging " of the head and neck.     Labs:  CBC  Recent Labs   Lab 07/13/21  1250 12/21/21  1050   WBC 6.23 5.65   Hemoglobin 14.8 15.5   Hematocrit 45.8 46.3   MCV 86 83   Platelets 239 257     BMP  Recent Labs   Lab 12/21/21  1050 01/10/23  1521 10/12/23  0930   Glucose 101 86 90   Sodium 142 140 139   Potassium 4.8 5.0 4.1   Chloride 103 104 105   CO2 30 H 27 23   BUN 21 H 16 15   Creatinine 1.10 0.9 1.0   Calcium 9.8 10.6 H 9.5     COAGS        Assessment  1. JOE (obstructive sleep apnea)  - Vital signs; Standing  - Diet NPO; Standing  - Case Request Operating Room: SLEEP ENDOSCOPY,DRUG-INDUCED  - Full code; Standing  - Place in Outpatient; Standing  - Place sequential compression device; Standing  - X-Ray Chest PA And Lateral; Standing  - Diet NPO    2. Difficulty with CPAP use  - Vital signs; Standing  - Diet NPO; Standing  - Case Request Operating Room: SLEEP ENDOSCOPY,DRUG-INDUCED  - Full code; Standing  - Place in Outpatient; Standing  - Place sequential compression device; Standing  - X-Ray Chest PA And Lateral; Standing  - Diet NPO       Plan:  Discussed plan of care with patient in detail and all questions answered. Patient reported understanding of plan of care. I gave the patient the opportunity to ask questions and patient confirmed all questions answered to satisfaction.     Obstructive sleep apnea, intolerant to CPAP:  we discussed about the pathophysiology of JOE and untreated moderate to severe JOE. We discussed that CPAP is the gold standard therapy (I.e most beneficial) for JOE.  We discussed today that inspire leads to return of AHI to normal range in only 60-70% of patients.however 90% have improved subjective fatigue and better sleep reported by bed partners. The patient is aware of cpap being the gold standard and potential limitations with inspire and that even though dise showed no concentric  collapse, this does not guarantee that joe will be cured . however of surgical options that exist this does  have a better cure rate than other things such as UP3       The patient has difficulty with tolerating CPAP intolerant and is interested in exploring alternative other options to manage disease.    We discussed about pros and cons of inspire as well as indications, alternatives .  This included a discussion about the implant itself and what the surgery would entail including incision placement, postop expectations, risks to nerves and what this would result in ( lip weakness, tongue weakness) . We also discussed the purpose of the DISE and how it will assess sources of obstruction. We discussed other surgical options that exist but are not curative of JOE such as septoplasty that can help with mask tolerance. Nasal surgery may lower CPAP pressures by 2-3cm H2O (Page 2000; Luiz 2006 ). Nasal surgery (septoplasty and turbinoplasty) led to increased CPAP use and decreased CPAP pressure (Chai et al, 2014). I will look for this at time of surgery to see if the pt would be candidate and present all options for treatment after DISE . We discussed nonsurgical option of oral appliance as well . List of dental providers in the [place] area that make oral appliance was given to patient     We discussed about expectations postop regarding symptom control and about titration process with implant and need for in hospital sleep study after implant should the patient meet criteria for surgery     The patient meets criteria for DISE based on BMI and AHI. The patient is interested in proceeding to see if DISE indicates obstructive pattern that would respond to inspire device.  DISE scheduled for 7-3-24  F/u postprocedurwe    I spent a total of 30 minutes on the day of the visit.  This includes face to face time and non-face to face time preparing to see the patient (eg, review of tests), obtaining and/or reviewing separately obtained history, documenting clinical information in the electronic or other health record,  independently interpreting results and communicating results to the patient/family/caregiver, or care coordinator.    Please be aware that this note has been generated with the assistance of MModal voice-to-text.  Please excuse any spelling or grammatical errors.

## 2024-06-19 NOTE — H&P (VIEW-ONLY)
OTOLARYNGOLOGY CLINIC NOTE  Date:  06/19/2024     Chief complaint:  Chief Complaint   Patient presents with    Sleep Apnea     Inspire consult        History of Present Illness  Juan Neves is a 79 y.o. male  presenting today for a new evaluation and treatment of suresh  Has tried cpap in the past  No tcurrenntly doing anything for tx of suresh     Tried several masks ; at times would be getting so much air it would wake him up. Was never able to get to 4 hours with mask on.     Has issues staying asleep but not falling asleep. Wakes up between 3 to 5 am .     + snoring. Pauses in breathing when sleeping. Sometimes has nocturia but not consistently. Does not feel well rested after sleep     2015 no baseline central 11 tx induced    Has had septoplaty before     Xylimelts has tried does mouth spray       Had no baseline central psg in 22015  only tx induced   Past Medical History  Past Medical History:   Diagnosis Date    Allergy     Anxiety     Cataract     Hyperlipidemia     Insomnia 2012    Sleep apnea         Past Surgical History  Past Surgical History:   Procedure Laterality Date    COLONOSCOPY      COLONOSCOPY N/A 01/23/2017    Procedure: COLONOSCOPY;  Surgeon: NASEEM Iqbal MD;  Location: [facility];  Service: Endoscopy;  Laterality: N/A;    EXCISION TURBINATE, SUBMUCOUS      NASAL RECONSTRUCTION      with bilateral  grafts    NASAL SEPTUM SURGERY          Medications  Current Outpatient Medications on File Prior to Visit   Medication Sig Dispense Refill    atorvastatin (LIPITOR) 20 MG tablet TAKE 1 TABLET EVERY DAY 90 tablet 1    fluticasone (FLONASE) 50 mcg/actuation nasal spray 1 spray by Each Nare route once daily. 3 Bottle 3    mirtazapine (REMERON) 15 MG tablet Take 1 tablet (15 mg total) by mouth every evening. 90 tablet 1    benzonatate (TESSALON PERLES) 100 MG capsule Take 2 capsules (200 mg total) by mouth 3 (three) times daily as needed for Cough. 30 capsule 1     No current  "facility-administered medications on file prior to visit.       Review of Systems  Review of Systems   Constitutional:  Positive for malaise/fatigue.   Cardiovascular: Negative.    Gastrointestinal:  Positive for diarrhea and heartburn.   Skin: Negative.    Neurological: Negative.     Answers submitted by the patient for this visit:  Review of Symptoms Questionnaire  (Submitted on 6/12/2024)  postnasal drip: Yes  eye itching: Yes  Snoring?: Yes  Sleep Apnea?: Yes  Acid Reflux?: Yes  Urinating too frequently?: Ye  sleep disturbance: Yes    Social History   reports that he has quit smoking. His smoking use included cigars. He has never been exposed to tobacco smoke. He has never used smokeless tobacco. He reports current alcohol use of about 8.0 standard drinks of alcohol per week. He reports that he does not use drugs.     Family History  Family History   Problem Relation Name Age of Onset    Diabetes Mother Mom     Cancer Father Dad     Diabetes Sister Sis     Cancer Sister Sis     Acne Daughter Nisha     No Known Problems Daughter      Coronary artery disease Neg Hx      Heart disease Neg Hx      Melanoma Neg Hx      Psoriasis Neg Hx      Eczema Neg Hx      Lupus Neg Hx          Physical Exam   Vitals:    06/19/24 0907   BP: (!) 143/79   Pulse: (!) 53    Body mass index is 28.6 kg/m².  Weight: 101.1 kg (222 lb 12.4 oz)   Height: 6' 2" (188 cm)     GENERAL: no acute distress.  HEAD: normocephalic.   EYES: lids and lashes normal. No scleral icterus  EARS: external ear without lesion, normal pinna shape and position.    NOSE: external nose without significant bony abnormality  ORAL CAVITY/OROPHARYNX: tongue midline and mobile. Symmetric palate rise. Uvula midline.   NECK: trachea midline.     RESPIRATORY: no stridor, no stertor. Voice normal. Respirations nonlabored.  NEURO: alert, responds to questions appropriately.   PSYCH:mood appropriate      Imaging:  The patient does not have any pertinent and/or recent imaging " of the head and neck.     Labs:  CBC  Recent Labs   Lab 07/13/21  1250 12/21/21  1050   WBC 6.23 5.65   Hemoglobin 14.8 15.5   Hematocrit 45.8 46.3   MCV 86 83   Platelets 239 257     BMP  Recent Labs   Lab 12/21/21  1050 01/10/23  1521 10/12/23  0930   Glucose 101 86 90   Sodium 142 140 139   Potassium 4.8 5.0 4.1   Chloride 103 104 105   CO2 30 H 27 23   BUN 21 H 16 15   Creatinine 1.10 0.9 1.0   Calcium 9.8 10.6 H 9.5     COAGS        Assessment  1. JOE (obstructive sleep apnea)  - Vital signs; Standing  - Diet NPO; Standing  - Case Request Operating Room: SLEEP ENDOSCOPY,DRUG-INDUCED  - Full code; Standing  - Place in Outpatient; Standing  - Place sequential compression device; Standing  - X-Ray Chest PA And Lateral; Standing  - Diet NPO    2. Difficulty with CPAP use  - Vital signs; Standing  - Diet NPO; Standing  - Case Request Operating Room: SLEEP ENDOSCOPY,DRUG-INDUCED  - Full code; Standing  - Place in Outpatient; Standing  - Place sequential compression device; Standing  - X-Ray Chest PA And Lateral; Standing  - Diet NPO       Plan:  Discussed plan of care with patient in detail and all questions answered. Patient reported understanding of plan of care. I gave the patient the opportunity to ask questions and patient confirmed all questions answered to satisfaction.     Obstructive sleep apnea, intolerant to CPAP:  we discussed about the pathophysiology of JOE and untreated moderate to severe JOE. We discussed that CPAP is the gold standard therapy (I.e most beneficial) for JOE.  We discussed today that inspire leads to return of AHI to normal range in only 60-70% of patients.however 90% have improved subjective fatigue and better sleep reported by bed partners. The patient is aware of cpap being the gold standard and potential limitations with inspire and that even though dise showed no concentric  collapse, this does not guarantee that joe will be cured . however of surgical options that exist this does  have a better cure rate than other things such as UP3       The patient has difficulty with tolerating CPAP intolerant and is interested in exploring alternative other options to manage disease.    We discussed about pros and cons of inspire as well as indications, alternatives .  This included a discussion about the implant itself and what the surgery would entail including incision placement, postop expectations, risks to nerves and what this would result in ( lip weakness, tongue weakness) . We also discussed the purpose of the DISE and how it will assess sources of obstruction. We discussed other surgical options that exist but are not curative of JOE such as septoplasty that can help with mask tolerance. Nasal surgery may lower CPAP pressures by 2-3cm H2O (Page 2000; Luiz 2006 ). Nasal surgery (septoplasty and turbinoplasty) led to increased CPAP use and decreased CPAP pressure (Chai et al, 2014). I will look for this at time of surgery to see if the pt would be candidate and present all options for treatment after DISE . We discussed nonsurgical option of oral appliance as well . List of dental providers in the [place] area that make oral appliance was given to patient     We discussed about expectations postop regarding symptom control and about titration process with implant and need for in hospital sleep study after implant should the patient meet criteria for surgery     The patient meets criteria for DISE based on BMI and AHI. The patient is interested in proceeding to see if DISE indicates obstructive pattern that would respond to inspire device.  DISE scheduled for 7-3-24  F/u postprocedurwe    I spent a total of 30 minutes on the day of the visit.  This includes face to face time and non-face to face time preparing to see the patient (eg, review of tests), obtaining and/or reviewing separately obtained history, documenting clinical information in the electronic or other health record,  independently interpreting results and communicating results to the patient/family/caregiver, or care coordinator.    Please be aware that this note has been generated with the assistance of MModal voice-to-text.  Please excuse any spelling or grammatical errors.

## 2024-06-24 ENCOUNTER — ANESTHESIA EVENT (OUTPATIENT)
Dept: SURGERY | Facility: HOSPITAL | Age: 79
End: 2024-06-24
Payer: MEDICARE

## 2024-06-24 ENCOUNTER — HOSPITAL ENCOUNTER (OUTPATIENT)
Dept: PREADMISSION TESTING | Facility: HOSPITAL | Age: 79
Discharge: HOME | End: 2024-06-24
Attending: OTOLARYNGOLOGY
Payer: MEDICARE

## 2024-06-24 ENCOUNTER — HOSPITAL ENCOUNTER (OUTPATIENT)
Dept: RADIOLOGY | Facility: HOSPITAL | Age: 79
Discharge: HOME | End: 2024-06-24
Attending: OTOLARYNGOLOGY
Payer: MEDICARE

## 2024-06-24 VITALS
OXYGEN SATURATION: 98 % | TEMPERATURE: 97 F | WEIGHT: 221.31 LBS | HEIGHT: 74 IN | RESPIRATION RATE: 16 BRPM | SYSTOLIC BLOOD PRESSURE: 132 MMHG | HEART RATE: 56 BPM | BODY MASS INDEX: 28.4 KG/M2 | DIASTOLIC BLOOD PRESSURE: 71 MMHG

## 2024-06-24 DIAGNOSIS — Z78.9 DIFFICULTY WITH CPAP USE: ICD-10-CM

## 2024-06-24 DIAGNOSIS — G47.33 OSA (OBSTRUCTIVE SLEEP APNEA): ICD-10-CM

## 2024-06-24 DIAGNOSIS — Z01.818 PREOP EXAMINATION: ICD-10-CM

## 2024-06-24 DIAGNOSIS — Z01.818 PREOPERATIVE TESTING: Primary | ICD-10-CM

## 2024-06-24 LAB
ALBUMIN SERPL BCP-MCNC: 4.3 G/DL (ref 3.5–5.2)
ALP SERPL-CCNC: 77 U/L (ref 55–135)
ALT SERPL W/O P-5'-P-CCNC: 17 U/L (ref 10–44)
ANION GAP SERPL CALC-SCNC: 8 MMOL/L (ref 8–16)
AST SERPL-CCNC: 20 U/L (ref 10–40)
BASOPHILS # BLD AUTO: 0.07 K/UL (ref 0–0.2)
BASOPHILS NFR BLD: 1.2 % (ref 0–1.9)
BILIRUB SERPL-MCNC: 0.6 MG/DL (ref 0.1–1)
BUN SERPL-MCNC: 22 MG/DL (ref 8–23)
CALCIUM SERPL-MCNC: 9.7 MG/DL (ref 8.7–10.5)
CHLORIDE SERPL-SCNC: 108 MMOL/L (ref 95–110)
CO2 SERPL-SCNC: 26 MMOL/L (ref 23–29)
CREAT SERPL-MCNC: 0.9 MG/DL (ref 0.5–1.4)
DIFFERENTIAL METHOD BLD: NORMAL
EOSINOPHIL # BLD AUTO: 0.3 K/UL (ref 0–0.5)
EOSINOPHIL NFR BLD: 4.1 % (ref 0–8)
ERYTHROCYTE [DISTWIDTH] IN BLOOD BY AUTOMATED COUNT: 14.4 % (ref 11.5–14.5)
EST. GFR  (NO RACE VARIABLE): >60 ML/MIN/1.73 M^2
GLUCOSE SERPL-MCNC: 78 MG/DL (ref 70–110)
HCT VFR BLD AUTO: 45.1 % (ref 40–54)
HGB BLD-MCNC: 15.1 G/DL (ref 14–18)
IMM GRANULOCYTES # BLD AUTO: 0.02 K/UL (ref 0–0.04)
IMM GRANULOCYTES NFR BLD AUTO: 0.3 % (ref 0–0.5)
LYMPHOCYTES # BLD AUTO: 1.3 K/UL (ref 1–4.8)
LYMPHOCYTES NFR BLD: 21.5 % (ref 18–48)
MCH RBC QN AUTO: 28 PG (ref 27–31)
MCHC RBC AUTO-ENTMCNC: 33.5 G/DL (ref 32–36)
MCV RBC AUTO: 84 FL (ref 82–98)
MONOCYTES # BLD AUTO: 0.4 K/UL (ref 0.3–1)
MONOCYTES NFR BLD: 7.2 % (ref 4–15)
NEUTROPHILS # BLD AUTO: 4 K/UL (ref 1.8–7.7)
NEUTROPHILS NFR BLD: 65.7 % (ref 38–73)
NRBC BLD-RTO: 0 /100 WBC
PLATELET # BLD AUTO: 215 K/UL (ref 150–450)
PMV BLD AUTO: 11.7 FL (ref 9.2–12.9)
POTASSIUM SERPL-SCNC: 4.8 MMOL/L (ref 3.5–5.1)
PROT SERPL-MCNC: 7.4 G/DL (ref 6–8.4)
RBC # BLD AUTO: 5.4 M/UL (ref 4.6–6.2)
SODIUM SERPL-SCNC: 142 MMOL/L (ref 136–145)
WBC # BLD AUTO: 6.08 K/UL (ref 3.9–12.7)

## 2024-06-24 PROCEDURE — 93005 ELECTROCARDIOGRAM TRACING: CPT | Mod: HCNC

## 2024-06-24 PROCEDURE — 71046 X-RAY EXAM CHEST 2 VIEWS: CPT | Mod: TC,HCNC,FY

## 2024-06-24 PROCEDURE — 71046 X-RAY EXAM CHEST 2 VIEWS: CPT | Mod: 26,HCNC,, | Performed by: RADIOLOGY

## 2024-06-24 PROCEDURE — 85025 COMPLETE CBC W/AUTO DIFF WBC: CPT | Mod: HCNC | Performed by: OTOLARYNGOLOGY

## 2024-06-24 PROCEDURE — 93010 ELECTROCARDIOGRAM REPORT: CPT | Mod: HCNC,,, | Performed by: INTERNAL MEDICINE

## 2024-06-24 PROCEDURE — 80053 COMPREHEN METABOLIC PANEL: CPT | Mod: HCNC | Performed by: OTOLARYNGOLOGY

## 2024-06-24 NOTE — ANESTHESIA PREPROCEDURE EVALUATION
06/24/2024  Juan Neves is a 79 y.o., male scheduled for SLEEP ENDOSCOPY,DRUG-INDUCED on 7/3/2024.      Past Medical History:   Diagnosis Date    Allergy     Anxiety     Cataract     Hyperlipidemia     Insomnia 2012    Sleep apnea        Past Surgical History:   Procedure Laterality Date    COLONOSCOPY      COLONOSCOPY N/A 01/23/2017    Procedure: COLONOSCOPY;  Surgeon: NASEEM Iqbal MD;  Location: [facility];  Service: Endoscopy;  Laterality: N/A;    EXCISION TURBINATE, SUBMUCOUS      NASAL RECONSTRUCTION      with bilateral  grafts    NASAL SEPTUM SURGERY             Pre-op Assessment    I have reviewed the Patient Summary Reports.     I have reviewed the Nursing Notes. I have reviewed the NPO Status.   I have reviewed the Medications.     Review of Systems  Anesthesia Hx:  No problems with previous Anesthesia             Denies Family Hx of Anesthesia complications.    Denies Personal Hx of Anesthesia complications.                    Social:  Former Smoker, Social Alcohol Use       Hematology/Oncology:  Hematology Normal   Oncology Normal                                   EENT/Dental:  EENT/Dental Normal           Cardiovascular:  Exercise tolerance: good    Denies Hypertension.           hyperlipidemia     Functional Capacity good / => 4 METS                         Pulmonary:        Sleep Apnea                Renal/:  Renal/ Normal                 Hepatic/GI:  Hepatic/GI Normal                 Musculoskeletal:  Musculoskeletal Normal                Neurological:  Neurology Normal                                      Endocrine:  Endocrine Normal          Obesity / BMI > 30  Dermatological:  Skin Normal    Psych:  Psychiatric Normal                    Physical Exam  General: Oriented, Alert and Cooperative    Airway:  Mallampati: II   Mouth Opening: Normal  TM Distance: > 6 cm  Neck  ROM: Normal ROM    Dental:  Intact        Anesthesia Plan  Type of Anesthesia, risks & benefits discussed:    Anesthesia Type: Gen Natural Airway  Intra-op Monitoring Plan: Standard ASA Monitors  Induction:  IV  Informed Consent: Informed consent signed with the Patient and all parties understand the risks and agree with anesthesia plan.  All questions answered. Patient consented to blood products? No  ASA Score: 2    Ready For Surgery From Anesthesia Perspective.     .

## 2024-06-24 NOTE — DISCHARGE INSTRUCTIONS
YOUR PROCEDURE WILL BE AT [facility] at [place] [place] [address]                  Before 7 AM, enter through the Emergency Entrance..   After 7 AM enter through the Main Entrance.                 Report to the Same Day Surgery Registration Desk in the hallway.(Just beside the Same Day Surgery Unit)      Your procedure  is scheduled for _7/3/2024_________.    Call [phone] between 2pm and 5pm on __7/2/2024_____to find out your arrival time for the day of surgery.    You may have two visitors.  No children under 12 years old.     You will be going to the Same Day Surgery Unit on the 2nd floor of the hospital.    Important instructions:  Do not eat anything after midnight.  You may have plain water, non carbonated.  You may also have Gatorade or Powerade after midnight.    Stop all fluids 2 hours before your surgery.    It is okay to brush your teeth.  Do not have gum, candy or mints.    SEE MEDICATION SHEET.   TAKE MEDICATIONS AS DIRECTED WITH SIPS OF WATER.      All GLP-1 weekly diabetic/weight loss medications must not be taken for one week before your surgery, or your surgery could be canceled.      STOP taking Aspirin, Ibuprofen,  Advil, Motrin, Mobic(meloxicam), Aleve (naproxen), Fish oil, and Vitamin E for at least 7 days before your surgery.     You may take Tylenol if needed which is not a blood thinner.    Please shower the night before and the morning of your surgery.      Contact lenses and removable denture work may not be worn during your procedure.    You may wear deodorant only. If you are having breast surgery, do not wear deodorant on the operative side.    Do not wear powder, body lotion, perfume/cologne or make-up.    Do not wear any jewelry or have any metal on your body.    You will be asked to remove any dentures or partials for the procedure.    If you are going home on the same day of surgery, you must arrange for a family member or a friend to  drive you home.  Public transportation is prohibited.  You will not be able to drive home if you were given anesthesia or sedation.    Patients who want to have their Post-op prescriptions filled from our in-house [facility], bring a Credit/Debit Card or cash with you. A co-pay may be required.  The pharmacy closes at 5:30 pm.    Wear loose fitting clothes allowing for bandages.    Please leave money and valuables home.      You may bring your cell phone.    Call the doctor if fever or illness should occur before your surgery.    Call [phone] to contact us here if needed.                            CLOTHES ON DAY OF SURGERY    SHOULDER surgery:  you must have a very oversized shirt.  Very, Very large.  You will probably have a large sling on with your arm strapped to your chest.  You will not be able to put the arm of the operated shoulder into a sleeve.  You can put the arm of the un-operated shoulder into the sleeve, but the shirt will need to be draped over the operated shoulder.       ARM or HAND surgery:  make sure that your sleeves are large and loose enough to pass over large dressings or cast.      BREAST or UNDERARM surgery:  wear a loose, button down shirt so that you can dress without raising your arms over your head.    ABDOMINAL surgery:  wear loose, comfortable clothing.  Nothing tight around the abdomen.  NO JEANS    PENIS or SCROTAL surgery:  loose comfortable clothing.  Large sweat pants, pajama pants or a robe.  ABSOLUTELY NO JEANS      LEG or FOOT surgery:  wear large loose pants that are able to pass over any large dressings or casts.  You could also wear loose shorts or a skirt.

## 2024-06-25 LAB
OHS QRS DURATION: 94 MS
OHS QTC CALCULATION: 403 MS

## 2024-07-03 ENCOUNTER — ANESTHESIA (OUTPATIENT)
Dept: SURGERY | Facility: HOSPITAL | Age: 79
End: 2024-07-03
Payer: MEDICARE

## 2024-07-03 ENCOUNTER — HOSPITAL ENCOUNTER (OUTPATIENT)
Facility: HOSPITAL | Age: 79
Discharge: HOME | End: 2024-07-03
Attending: OTOLARYNGOLOGY | Admitting: OTOLARYNGOLOGY
Payer: MEDICARE

## 2024-07-03 VITALS
BODY MASS INDEX: 28.43 KG/M2 | SYSTOLIC BLOOD PRESSURE: 125 MMHG | TEMPERATURE: 98 F | DIASTOLIC BLOOD PRESSURE: 73 MMHG | RESPIRATION RATE: 16 BRPM | WEIGHT: 221.38 LBS | HEART RATE: 51 BPM | OXYGEN SATURATION: 96 %

## 2024-07-03 DIAGNOSIS — Z78.9 DIFFICULTY WITH CPAP USE: ICD-10-CM

## 2024-07-03 DIAGNOSIS — G47.33 OSA (OBSTRUCTIVE SLEEP APNEA): ICD-10-CM

## 2024-07-03 PROCEDURE — 37000008 HC ANESTHESIA 1ST 15 MINUTES: Mod: HCNC | Performed by: OTOLARYNGOLOGY

## 2024-07-03 PROCEDURE — 63600175 PHARM REV CODE 636 W HCPCS: Mod: HCNC | Performed by: ANESTHESIOLOGY

## 2024-07-03 PROCEDURE — 63600175 PHARM REV CODE 636 W HCPCS: Mod: HCNC | Performed by: NURSE ANESTHETIST, CERTIFIED REGISTERED

## 2024-07-03 PROCEDURE — 36000708 HC OR TIME LEV III 1ST 15 MIN: Mod: HCNC | Performed by: OTOLARYNGOLOGY

## 2024-07-03 PROCEDURE — 42975 DISE EVAL SLP DO BRTH FLX DX: CPT | Mod: HCNC,,, | Performed by: OTOLARYNGOLOGY

## 2024-07-03 PROCEDURE — 25000003 PHARM REV CODE 250: Mod: HCNC | Performed by: ANESTHESIOLOGY

## 2024-07-03 PROCEDURE — 71000015 HC POSTOP RECOV 1ST HR: Mod: HCNC | Performed by: OTOLARYNGOLOGY

## 2024-07-03 PROCEDURE — 25000003 PHARM REV CODE 250: Mod: HCNC | Performed by: OTOLARYNGOLOGY

## 2024-07-03 PROCEDURE — 25000003 PHARM REV CODE 250: Mod: HCNC | Performed by: NURSE ANESTHETIST, CERTIFIED REGISTERED

## 2024-07-03 RX ORDER — ACETAMINOPHEN 500 MG
1000 TABLET ORAL
Status: COMPLETED | OUTPATIENT
Start: 2024-07-03 | End: 2024-07-03

## 2024-07-03 RX ORDER — LIDOCAINE HYDROCHLORIDE 10 MG/ML
1 INJECTION, SOLUTION EPIDURAL; INFILTRATION; INTRACAUDAL; PERINEURAL ONCE
Status: DISCONTINUED | OUTPATIENT
Start: 2024-07-03 | End: 2024-07-03 | Stop reason: HOSPADM

## 2024-07-03 RX ORDER — SODIUM CHLORIDE, SODIUM LACTATE, POTASSIUM CHLORIDE, CALCIUM CHLORIDE 600; 310; 30; 20 MG/100ML; MG/100ML; MG/100ML; MG/100ML
INJECTION, SOLUTION INTRAVENOUS CONTINUOUS
Status: DISCONTINUED | OUTPATIENT
Start: 2024-07-03 | End: 2024-07-03 | Stop reason: HOSPADM

## 2024-07-03 RX ORDER — PROPOFOL 10 MG/ML
VIAL (ML) INTRAVENOUS CONTINUOUS PRN
Status: DISCONTINUED | OUTPATIENT
Start: 2024-07-03 | End: 2024-07-03

## 2024-07-03 RX ORDER — OXYMETAZOLINE HCL 0.05 %
SPRAY, NON-AEROSOL (ML) NASAL
Status: DISCONTINUED | OUTPATIENT
Start: 2024-07-03 | End: 2024-07-03 | Stop reason: HOSPADM

## 2024-07-03 RX ORDER — LIDOCAINE HYDROCHLORIDE 20 MG/ML
INJECTION INTRAVENOUS
Status: DISCONTINUED | OUTPATIENT
Start: 2024-07-03 | End: 2024-07-03

## 2024-07-03 RX ADMIN — ACETAMINOPHEN 1000 MG: 500 TABLET ORAL at 11:07

## 2024-07-03 RX ADMIN — PROPOFOL 150 MCG/KG/MIN: 10 INJECTION, EMULSION INTRAVENOUS at 01:07

## 2024-07-03 RX ADMIN — SODIUM CHLORIDE, SODIUM LACTATE, POTASSIUM CHLORIDE, AND CALCIUM CHLORIDE: .6; .31; .03; .02 INJECTION, SOLUTION INTRAVENOUS at 12:07

## 2024-07-03 RX ADMIN — SODIUM CHLORIDE, POTASSIUM CHLORIDE, SODIUM LACTATE AND CALCIUM CHLORIDE 1000 ML: 600; 310; 30; 20 INJECTION, SOLUTION INTRAVENOUS at 11:07

## 2024-07-03 RX ADMIN — LIDOCAINE HYDROCHLORIDE 100 MG: 20 INJECTION, SOLUTION INTRAVENOUS at 01:07

## 2024-07-03 NOTE — BRIEF OP NOTE
[facility] - Surgery  Brief Operative Note    Surgery Date: 7/3/2024     Surgeons and Role:     * Aditi Levine MD - Primary    Assisting Surgeon: None    Pre-op Diagnosis:  JOE (obstructive sleep apnea) [G47.33]  Difficulty with CPAP use [Z78.9]    Post-op Diagnosis:  Post-Op Diagnosis Codes:     * JOE (obstructive sleep apnea) [G47.33]     * Difficulty with CPAP use [Z78.9]    Procedure(s) (LRB):  SLEEP ENDOSCOPY,DRUG-INDUCED (N/A)    Anesthesia: General    Operative Findings: redundant uvular tissue; complete a-p collapse. Turbinate hypertrophy; septum mild deviation to left   No epiglottic collapse. Moderate improvement with jaw thrust- redundant uvular tissue flapping but airway opening and base of tongue collapse much better ; no concentric collapse    Estimated Blood Loss: 0 cc         Specimens:   Specimen (24h ago, onward)      None              Discharge Note    OUTCOME: Patient tolerated treatment/procedure well without complication and will be discharged when meets pacu criteria.    DISPOSITION: Home or Self Care    FINAL DIAGNOSIS:  JOE intolerant to cpap     FOLLOWUP: In clinic    DISCHARGE INSTRUCTIONS:  No discharge procedures on file.

## 2024-07-03 NOTE — ANESTHESIA POSTPROCEDURE EVALUATION
Anesthesia Post Evaluation    Patient: Juan Neves    Procedure(s) Performed: Procedure(s) (LRB):  SLEEP ENDOSCOPY,DRUG-INDUCED (N/A)    Final Anesthesia Type: general      Patient location during evaluation: GI PACU  Patient participation: Yes- Able to Participate  Level of consciousness: awake and alert  Post-procedure vital signs: reviewed and stable  Airway patency: patent    PONV status at discharge: No PONV  Anesthetic complications: no      Cardiovascular status: blood pressure returned to baseline and hemodynamically stable  Respiratory status: unassisted, spontaneous ventilation and room air  Hydration status: euvolemic  Follow-up not needed.              Vitals Value Taken Time   /73 07/03/24 1322   Temp 36.6 °C (97.8 °F) 07/03/24 1322   Pulse 51 07/03/24 1322   Resp 16 07/03/24 1322   SpO2 96 % 07/03/24 1322         No case tracking events are documented in the log.      Pain/Sg Score: Pain Rating Prior to Med Admin: 0 (7/3/2024 11:05 AM)  Sg Score: 10 (7/3/2024  1:22 PM)  Modified Sg Score: 20 (7/3/2024  1:22 PM)

## 2024-07-03 NOTE — PLAN OF CARE
Care complete. Pt verbalized understanding of discharge instructions and readiness to go home. Spouse at bedside to transport pt home.

## 2024-07-03 NOTE — TRANSFER OF CARE
Anesthesia Transfer of Care Note    Patient: Juan Neves    Procedure(s) Performed: Procedure(s) (LRB):  SLEEP ENDOSCOPY,DRUG-INDUCED (N/A)    Patient location: [facility]    Anesthesia Type: MAC    Transport from OR: Transported from OR on room air with adequate spontaneous ventilation    Post pain: adequate analgesia    Post assessment: no apparent anesthetic complications and tolerated procedure well    Post vital signs: stable    Level of consciousness: awake and alert    Nausea/Vomiting: no nausea/vomiting    Complications: none    Transfer of care protocol was followed      Last vitals: Visit Vitals  /73 (BP Location: Right arm, Patient Position: Lying)   Pulse (!) 51   Temp 36.6 °C (97.8 °F) (Oral)   Resp 16   Wt 100.4 kg (221 lb 6.4 oz)   SpO2 96%   BMI 28.43 kg/m²

## 2024-07-03 NOTE — OP NOTE
[facility] - Surgery  Surgery Department  Operative Note    SUMMARY     Date of Procedure: 7/3/2024     Procedure: Procedure(s) (LRB):  SLEEP ENDOSCOPY,DRUG-INDUCED (N/A)   (evaluation of sleep disordered breathing by examination of upper airway using an endoscope- CPT 15084).     Surgeons and Role:     * Aditi Levine MD - Primary    Assisting Surgeon: None    Pre-Operative Diagnosis: JOE (obstructive sleep apnea) [G47.33]  Difficulty with CPAP use [Z78.9]    Post-Operative Diagnosis: Post-Op Diagnosis Codes:     * JOE (obstructive sleep apnea) [G47.33]     * Difficulty with CPAP use [Z78.9]    Anesthesia: General    Operative Findings (including complications, if any):  redundant uvular tissue; complete a-p collapse. Turbinate hypertrophy; septum mild deviation to left   No epiglottic collapse. Moderate improvement with jaw thrust- redundant uvular tissue flapping but airway opening and base of tongue collapse much better ; no concentric collapse  There was no evidence of complete concentric palatal obstruction and the patient does appear to be a candidate anatomically for hypoglossal nerve stimulation therapy.     Brief Clinical History: This is a patient with a history of moderate to severe symptomatic obstructive sleep apnea, who is intolerant and unable to achieve benefit with positive pressure therapy. The patient presents today for drug-induced sleep endoscopy to better characterize locations and pattern of obstruction and to predict appropriate medical and/or surgical options moving forward.     Description of Procedure: The patient was brought to the operating room and was anesthetized via the standard drug-induced sleep endoscopy protocol. The propofol infusion rate was started and gradually increased to a level at which point, conditions that mimic sleep were gradually observed. The patient was prepped as per usual and a time out was performed with correct patient and procedure identified.     With  the patient not responsive to verbal commands, but still with spontaneous respiration, sleep disordered breathing events and associated desaturations were clearly observed. Under these conditions, the flexible endoscope was inserted to examine both sides of the nose as well as the pharynx and larynx. The VOTE score at baseline was  complete AP collapse.  With simulated jaw advancement and tongue advancement, the hypopharyngeal obstruction and secondarily the palatal collapse also improved. Additional findings as noted in operative findings section. The scope was removed and the patient was handed back over to anesthesia for awakening.I was present for and performed the entire procedure.      Significant Surgical Tasks Conducted by the Assistant(s), if Applicable: none    Estimated Blood Loss (EBL): 0 cc           Implants: * No implants in log *    Specimens:   Specimen (24h ago, onward)      None                    Condition: Good    Disposition: PACU - hemodynamically stable.    Attestation: Op Note Attestation: I was physically present and scrubbed for the entire procedure.

## 2024-07-11 ENCOUNTER — OFFICE VISIT (OUTPATIENT)
Dept: OTOLARYNGOLOGY | Facility: CLINIC | Age: 79
End: 2024-07-11
Payer: MEDICARE

## 2024-07-11 DIAGNOSIS — Z78.9 DIFFICULTY WITH CPAP USE: ICD-10-CM

## 2024-07-11 DIAGNOSIS — G47.33 OSA (OBSTRUCTIVE SLEEP APNEA): Primary | ICD-10-CM

## 2024-07-11 PROCEDURE — 1159F MED LIST DOCD IN RCRD: CPT | Mod: CPTII,S$GLB,, | Performed by: OTOLARYNGOLOGY

## 2024-07-11 PROCEDURE — 99214 OFFICE O/P EST MOD 30 MIN: CPT | Mod: S$GLB,,, | Performed by: OTOLARYNGOLOGY

## 2024-07-11 PROCEDURE — 1126F AMNT PAIN NOTED NONE PRSNT: CPT | Mod: CPTII,S$GLB,, | Performed by: OTOLARYNGOLOGY

## 2024-07-11 PROCEDURE — 1101F PT FALLS ASSESS-DOCD LE1/YR: CPT | Mod: CPTII,S$GLB,, | Performed by: OTOLARYNGOLOGY

## 2024-07-11 PROCEDURE — 3288F FALL RISK ASSESSMENT DOCD: CPT | Mod: CPTII,S$GLB,, | Performed by: OTOLARYNGOLOGY

## 2024-07-11 RX ORDER — LIDOCAINE HYDROCHLORIDE 10 MG/ML
1 INJECTION, SOLUTION EPIDURAL; INFILTRATION; INTRACAUDAL; PERINEURAL ONCE
OUTPATIENT
Start: 2024-07-11 | End: 2024-07-11

## 2024-07-11 RX ORDER — CLINDAMYCIN PHOSPHATE 900 MG/50ML
900 INJECTION, SOLUTION INTRAVENOUS
OUTPATIENT
Start: 2024-07-11

## 2024-07-11 RX ORDER — SODIUM CHLORIDE 0.9 % (FLUSH) 0.9 %
10 SYRINGE (ML) INJECTION
OUTPATIENT
Start: 2024-07-11

## 2024-07-11 NOTE — PROGRESS NOTES
"  OTOLARYNGOLOGY CLINIC NOTE  Date:  07/11/2024     Chief complaint:  Chief Complaint   Patient presents with    Post-op Evaluation       History of Present Illness  Juan Neves is a 79 y.o. male  presenting today for a followup.  April study 2024 had baseline central but that was not there in 2015   Underwent dise 7-3-24 "redundant uvular tissue; complete a-p collapse. Turbinate hypertrophy; septum mild deviation to left   No epiglottic collapse. Moderate improvement with jaw thrust- redundant uvular tissue flapping but airway opening and base of tongue collapse much better ; no concentric collapse"    I last saw the patient on 6-19-24. Below text is copied from  note on that date describing history of present illness at that time :  No tcurrenntly doing anything for tx of suresh      Tried several masks ; at times would be getting so much air it would wake him up. Was never able to get to 4 hours with mask on.      Has issues staying asleep but not falling asleep. Wakes up between 3 to 5 am .      + snoring. Pauses in breathing when sleeping. Sometimes has nocturia but not consistently. Does not feel well rested after sleep      2015 no baseline central 11 tx induced     Has had septoplaty before      Xylimelts has tried does mouth spray        Had no baseline central psg in 22015  only tx induced     Past Medical History  Past Medical History:   Diagnosis Date    Allergy     Anxiety     Cataract     Hyperlipidemia     Insomnia 2012    Sleep apnea         Past Surgical History  Past Surgical History:   Procedure Laterality Date    COLONOSCOPY      COLONOSCOPY N/A 01/23/2017    Procedure: COLONOSCOPY;  Surgeon: NASEEM Iqbal MD;  Location: [facility];  Service: Endoscopy;  Laterality: N/A;    EXCISION TURBINATE, SUBMUCOUS      NASAL RECONSTRUCTION      with bilateral  grafts    NASAL SEPTUM SURGERY      SLEEP ENDOSCOPY, DRUG-INDUCED N/A 7/3/2024    Procedure: SLEEP ENDOSCOPY,DRUG-INDUCED;  Surgeon: " Aditi Levine MD;  Location: [facility] OR;  Service: ENT;  Laterality: N/A;  RN PREOP 6/24/2024        Medications  Current Outpatient Medications on File Prior to Visit   Medication Sig Dispense Refill    atorvastatin (LIPITOR) 20 MG tablet TAKE 1 TABLET EVERY DAY 90 tablet 1    fluticasone (FLONASE) 50 mcg/actuation nasal spray 1 spray by Each Nare route once daily. 3 Bottle 3    mirtazapine (REMERON) 15 MG tablet Take 1 tablet (15 mg total) by mouth every evening. 90 tablet 1     No current facility-administered medications on file prior to visit.       Review of Systems  Review of Systems   Constitutional: Negative.    HENT: Negative.     Eyes: Negative.    Cardiovascular: Negative.    Gastrointestinal: Negative.    Genitourinary: Negative.    Musculoskeletal: Negative.    Skin: Negative.    Neurological: Negative.    Psychiatric/Behavioral: Negative.          Social History   reports that he has quit smoking. His smoking use included cigars. He has never been exposed to tobacco smoke. He has never used smokeless tobacco. He reports current alcohol use of about 8.0 standard drinks of alcohol per week. He reports that he does not use drugs.     Family History  Family History   Problem Relation Name Age of Onset    Diabetes Mother Mom     Cancer Father Dad     Diabetes Sister Sis     Cancer Sister Sis     Acne Daughter Nisha     No Known Problems Daughter      Coronary artery disease Neg Hx      Heart disease Neg Hx      Melanoma Neg Hx      Psoriasis Neg Hx      Eczema Neg Hx      Lupus Neg Hx          Physical Exam   There were no vitals filed for this visit. There is no height or weight on file to calculate BMI.            GENERAL: no acute distress.  HEAD: normocephalic.   EYES: No scleral icterus  EARS: external ear without lesion, normal pinna shape and position.    NOSE: external nose without significant bony abnormality  ORAL CAVITY/OROPHARYNX: tongue mobile.   NECK: trachea midline.   RESPIRATORY: no  stridor, no stertor. Voice normal. Respirations nonlabored.  NEURO: alert, responds to questions appropriately.    PSYCH:mood appropriate      Imaging:  The patient does not have any new imaging of the head and neck since last visit.     Labs:  CBC  Recent Labs   Lab 07/13/21  1250 12/21/21  1050 06/24/24  1410   WBC 6.23 5.65 6.08   Hemoglobin 14.8 15.5 15.1   Hematocrit 45.8 46.3 45.1   MCV 86 83 84   Platelets 239 257 215     BMP  Recent Labs   Lab 01/10/23  1521 10/12/23  0930 06/24/24  1410   Glucose 86 90 78   Sodium 140 139 142   Potassium 5.0 4.1 4.8   Chloride 104 105 108   CO2 27 23 26   BUN 16 15 22   Creatinine 0.9 1.0 0.9   Calcium 10.6 H 9.5 9.7     COAGS        Assessment  1. JOE (obstructive sleep apnea)  - Vital signs; Standing  - Diet NPO; Standing  - Case Request Operating Room: INSERTION,NEUROSTIMULATOR,HYPOGLOSSAL  - Full code; Standing  - Place in Outpatient; Standing  - Place sequential compression device; Standing  - CBC auto differential; Standing  - Comprehensive Metabolic Panel; Standing    2. Difficulty with CPAP use  - Vital signs; Standing  - Diet NPO; Standing  - Case Request Operating Room: INSERTION,NEUROSTIMULATOR,HYPOGLOSSAL  - Full code; Standing  - Place in Outpatient; Standing  - Place sequential compression device; Standing  - CBC auto differential; Standing  - Comprehensive Metabolic Panel; Standing       Plan:  Discussed plan of care with patient in detail and all questions answered. Patient reported understanding of plan of care. I gave the patient the opportunity to ask questions and patient confirmed all questions answered to satisfaction.     Discussed with him about issues with central sleep apnea and that central sleep apnea tx option of ASV. Unclear if this will persist and now has some baseline not enough to exclude as candidate from inspire as less than 25% of events however this will also lead to disrupted sleep and fatigue. He does not want to nor think he could  tolerate asv and understands risk of inspire not working even if he is in percentage of people that have obstructive events controlled by inspire. He is aware that passing dise does not mean inspire will work     The patient has undergone drug induced sleep endoscopy (DISE) and this showed that the patient would be a good candidate for hypoglossal nerve stimulation for treatment of obstructive sleep apnea (JOE) not amenable to positive pressure therapy.   We again discussed today that inspire leads to return of AHI to normal range in only 60-70% of patients.however 90% have improved subjective fatigue and better sleep reported by bed partners. The patient is aware of cpap being the gold standard and potential limitations with inspire and that even though dise showed no concentric  collapse, this does not guarantee that joe will be cured . however of surgical options that exist this does have a better cure rate than other things such as UP3      Discussed about option of septoplasty and repeat trial of cpap mask as an option as well although that does not pose potential for cure but can help with mask tolerance.     Discussed risks/benefits/indications/alternatives of surgical procedure including but not limited to: device not working, need to explant device if infection or other problem, pneumothorax requiring chest tube placement, permanent or temporary weakness of hypoglossal nerve and marginal mandibular nerve, need for other procedures for treatment of sleep apnea. Pt is aware of mri restrictions and need for battery replacement every 11 years. We discussed expected postoperative course and visits including but not limited to when device would be activated and process of altering stimulus and need for in person polysomnogram at certain time point for postoperative evaluation. Discussed about potential for tongue swelling and soreness, neck swelling and chest discomfort as most common issues  postoperatively    Will obtain post-implant chest xray in pacu to document baseline location of implant. The patient will be able to go home postoperatively barring any issues; if 23 hour observation needed , will determine based on postop course in PACU but expect pt should be able to go home postop.     Pt elected to proceed with inspire implant surgery via 2 incision method. Surgery was scheduled for 9-24-24.     F/u postop    I spent a total of 30 minutes on the day of the visit.  This includes face to face time and non-face to face time preparing to see the patient (eg, review of tests), obtaining and/or reviewing separately obtained history, documenting clinical information in the electronic or other health record, independently interpreting results and communicating results to the patient/family/caregiver, or care coordinator.   Please be aware that this note has been generated with the assistance of Ed voice-to-text.  Please excuse any spelling or grammatical errors.

## 2024-07-16 DIAGNOSIS — Z78.9 DIFFICULTY WITH CPAP USE: ICD-10-CM

## 2024-07-16 DIAGNOSIS — G47.33 OSA (OBSTRUCTIVE SLEEP APNEA): Primary | ICD-10-CM

## 2024-08-26 ENCOUNTER — TELEPHONE (OUTPATIENT)
Dept: PREADMISSION TESTING | Facility: HOSPITAL | Age: 79
End: 2024-08-26
Payer: MEDICARE

## 2024-08-26 ENCOUNTER — ANESTHESIA EVENT (OUTPATIENT)
Dept: SURGERY | Facility: HOSPITAL | Age: 79
End: 2024-08-26
Payer: MEDICARE

## 2024-09-19 ENCOUNTER — TELEPHONE (OUTPATIENT)
Dept: OTOLARYNGOLOGY | Facility: CLINIC | Age: 79
End: 2024-09-19
Payer: MEDICARE

## 2024-09-19 NOTE — TELEPHONE ENCOUNTER
----- Message from Mary Stinson sent at 9/19/2024 12:01 PM CDT -----  Regarding: Return call  Contact: [phone]  Type:  Patient Returning Call    Who Called:Juan  Who Left Message for Patient:Kimberly  Does the patient know what this is regarding?: Surgery  Would the patient rather a call back or a response via [email or web address]? Call  Best Call Back Number: [phone]  Additional Information:

## 2024-09-19 NOTE — TELEPHONE ENCOUNTER
Lvm for pt to return call because pt surgery needs to be pushed back atleast 2 weeks from today, so next available surgery date is 10/08/2024, asked pt to call and let us know if can do that date

## 2024-09-19 NOTE — TELEPHONE ENCOUNTER
Spoke with pt, got his surgery rescheduled, offered 10/08/2024, pt states can't do that day, so surgery set on 10/22/2024, pt verb understanding

## 2024-09-23 ENCOUNTER — TELEPHONE (OUTPATIENT)
Dept: FAMILY MEDICINE | Facility: CLINIC | Age: 79
End: 2024-09-23
Payer: MEDICARE

## 2024-09-23 ENCOUNTER — PATIENT MESSAGE (OUTPATIENT)
Dept: FAMILY MEDICINE | Facility: CLINIC | Age: 79
End: 2024-09-23
Payer: MEDICARE

## 2024-09-23 NOTE — TELEPHONE ENCOUNTER
Called patient to reschedule upcoming appointment he has with Dr Welsh. Patient didn't answer. Left vm for patient. Also sent portal message.

## 2024-09-24 ENCOUNTER — ANESTHESIA (OUTPATIENT)
Dept: SURGERY | Facility: HOSPITAL | Age: 79
End: 2024-09-24
Payer: MEDICARE

## 2024-10-07 ENCOUNTER — HOSPITAL ENCOUNTER (OUTPATIENT)
Dept: PREADMISSION TESTING | Facility: HOSPITAL | Age: 79
Discharge: HOME | End: 2024-10-07
Attending: OTOLARYNGOLOGY
Payer: MEDICARE

## 2024-10-07 VITALS
BODY MASS INDEX: 28.88 KG/M2 | HEART RATE: 58 BPM | DIASTOLIC BLOOD PRESSURE: 83 MMHG | SYSTOLIC BLOOD PRESSURE: 150 MMHG | RESPIRATION RATE: 20 BRPM | WEIGHT: 225.06 LBS | OXYGEN SATURATION: 100 % | HEIGHT: 74 IN

## 2024-10-07 DIAGNOSIS — G47.33 OSA (OBSTRUCTIVE SLEEP APNEA): ICD-10-CM

## 2024-10-07 DIAGNOSIS — Z78.9 DIFFICULTY WITH CPAP USE: ICD-10-CM

## 2024-10-07 DIAGNOSIS — E78.00 PURE HYPERCHOLESTEROLEMIA: Chronic | ICD-10-CM

## 2024-10-07 LAB
ALBUMIN SERPL BCP-MCNC: 4.1 G/DL (ref 3.5–5.2)
ALP SERPL-CCNC: 81 U/L (ref 55–135)
ALT SERPL W/O P-5'-P-CCNC: 21 U/L (ref 10–44)
ANION GAP SERPL CALC-SCNC: 8 MMOL/L (ref 8–16)
AST SERPL-CCNC: 21 U/L (ref 10–40)
BASOPHILS # BLD AUTO: 0.07 K/UL (ref 0–0.2)
BASOPHILS NFR BLD: 1.2 % (ref 0–1.9)
BILIRUB SERPL-MCNC: 0.6 MG/DL (ref 0.1–1)
BUN SERPL-MCNC: 18 MG/DL (ref 8–23)
CALCIUM SERPL-MCNC: 9.5 MG/DL (ref 8.7–10.5)
CHLORIDE SERPL-SCNC: 107 MMOL/L (ref 95–110)
CHOLEST SERPL-MCNC: 243 MG/DL (ref 120–199)
CHOLEST/HDLC SERPL: 4 {RATIO} (ref 2–5)
CO2 SERPL-SCNC: 24 MMOL/L (ref 23–29)
CREAT SERPL-MCNC: 1 MG/DL (ref 0.5–1.4)
DIFFERENTIAL METHOD BLD: ABNORMAL
EOSINOPHIL # BLD AUTO: 0.5 K/UL (ref 0–0.5)
EOSINOPHIL NFR BLD: 7.8 % (ref 0–8)
ERYTHROCYTE [DISTWIDTH] IN BLOOD BY AUTOMATED COUNT: 14.6 % (ref 11.5–14.5)
EST. GFR  (NO RACE VARIABLE): >60 ML/MIN/1.73 M^2
GLUCOSE SERPL-MCNC: 83 MG/DL (ref 70–110)
HCT VFR BLD AUTO: 47 % (ref 40–54)
HDLC SERPL-MCNC: 61 MG/DL (ref 40–75)
HDLC SERPL: 25.1 % (ref 20–50)
HGB BLD-MCNC: 15.8 G/DL (ref 14–18)
IMM GRANULOCYTES # BLD AUTO: 0.01 K/UL (ref 0–0.04)
IMM GRANULOCYTES NFR BLD AUTO: 0.2 % (ref 0–0.5)
LDLC SERPL CALC-MCNC: 160.8 MG/DL (ref 63–159)
LYMPHOCYTES # BLD AUTO: 1.6 K/UL (ref 1–4.8)
LYMPHOCYTES NFR BLD: 27.7 % (ref 18–48)
MCH RBC QN AUTO: 28.9 PG (ref 27–31)
MCHC RBC AUTO-ENTMCNC: 33.6 G/DL (ref 32–36)
MCV RBC AUTO: 86 FL (ref 82–98)
MONOCYTES # BLD AUTO: 0.6 K/UL (ref 0.3–1)
MONOCYTES NFR BLD: 9.7 % (ref 4–15)
NEUTROPHILS # BLD AUTO: 3.1 K/UL (ref 1.8–7.7)
NEUTROPHILS NFR BLD: 53.4 % (ref 38–73)
NONHDLC SERPL-MCNC: 182 MG/DL
NRBC BLD-RTO: 0 /100 WBC
PLATELET # BLD AUTO: 226 K/UL (ref 150–450)
PMV BLD AUTO: 11.3 FL (ref 9.2–12.9)
POTASSIUM SERPL-SCNC: 4.7 MMOL/L (ref 3.5–5.1)
PROT SERPL-MCNC: 7.6 G/DL (ref 6–8.4)
RBC # BLD AUTO: 5.47 M/UL (ref 4.6–6.2)
SODIUM SERPL-SCNC: 139 MMOL/L (ref 136–145)
TRIGL SERPL-MCNC: 106 MG/DL (ref 30–150)
WBC # BLD AUTO: 5.77 K/UL (ref 3.9–12.7)

## 2024-10-07 PROCEDURE — 80053 COMPREHEN METABOLIC PANEL: CPT | Mod: HCNC | Performed by: OTOLARYNGOLOGY

## 2024-10-07 PROCEDURE — 80061 LIPID PANEL: CPT | Mod: HCNC | Performed by: INTERNAL MEDICINE

## 2024-10-07 PROCEDURE — 85025 COMPLETE CBC W/AUTO DIFF WBC: CPT | Mod: HCNC | Performed by: OTOLARYNGOLOGY

## 2024-10-07 NOTE — DISCHARGE INSTRUCTIONS
YOUR PROCEDURE WILL BE AT [facility] at [address] [place] [address]                 Enter through the Main Entrance facing [address].                 Report to the Same Day Surgery Registration Desk in the hallway.(Just beside the Same Day Surgery Unit)      Your procedure  is scheduled for ___10/22/2024_______.    Call [phone] between 2pm and 5pm on ___10/21/2024____to find out your arrival time for the day of surgery.    You may have two visitors.  No children under 12 years old.     You will be going to the Same Day Surgery Unit on the 2nd floor of the hospital.    Important instructions:  Do not eat anything after midnight.  You may have plain water, non carbonated.  You may also have Gatorade or Powerade after midnight.    Stop all fluids 2 hours before your surgery.    It is okay to brush your teeth.  Do not have gum, candy or mints.    SEE MEDICATION SHEET.   TAKE MEDICATIONS AS DIRECTED.      Do not take any diabetic medication on the morning of surgery unless instructed to do so by your doctor or pre op nurse.      All GLP-1 weekly diabetic/weight loss medications must not be taken for one week before your surgery, or your surgery could be canceled.      STOP taking Aspirin, Ibuprofen,  Advil, Motrin, Mobic(meloxicam, celebrex), Aleve (naproxen), Fish oil, and Vitamin E for at least 7 days before your surgery.     You may take Tylenol if needed which is not a blood thinner.    Please shower the night before and the morning of your surgery.      Follow any Prep Instructions given by your surgeon.    Use Chlorhexidine soap as instructed by your pre op nurse.   Please place clean linens on your bed the night before surgery. Please wear fresh clean clothing after each shower.    No shaving of procedural area at least 4-5 days before surgery due to increased risk of skin irritation and/or possible infection.    Female patients may be asked for a urine specimen  on the morning of the surgery.  Please check with your nurse before using the restroom.    Contact lenses and removable denture work may not be worn during your procedure.    You may wear deodorant only. If you are having breast surgery, do not wear deodorant on the operative side.    Do not wear powder, body lotion, perfume/cologne, oils, creams or rubs.    Do not wear any jewelry or have any metal on your body.    You will be asked to remove any dentures or partials for the procedure.    If you are going home on the same day of surgery, you must arrange for a family member or a friend to drive you home.  Public transportation is prohibited.  You will not be able to drive home if you were given anesthesia or sedation.    Patients who want to have their Post-op prescriptions filled from our in-house [facility], bring a Credit/Debit Card or cash with you. A co-pay may be required.  The pharmacy closes at 5:30 pm.    Wear loose fitting clothes allowing for bandages.    Please leave money and valuables home.      You may bring your cell phone.    Call the doctor if fever or illness should occur before your surgery.    Call [phone] to contact us here if needed.                            CLOTHES ON DAY OF SURGERY    SHOULDER surgery:  you must have a very oversized shirt.  Very, Very large.  You will probably have a large sling on with your arm strapped to your chest.  You will not be able to put the arm of the operated shoulder into a sleeve.  You can put the arm of the un-operated shoulder into the sleeve, but the shirt will need to be draped over the operated shoulder.       ARM or HAND surgery:  make sure that your sleeves are large and loose enough to pass over large dressings or cast.      BREAST or UNDERARM surgery:  wear a loose, button down shirt so that you can dress without raising your arms over your head.    ABDOMINAL surgery:  wear loose, comfortable clothing.  Nothing tight around the abdomen.  NO  JEANS    PENIS or SCROTAL surgery:  loose comfortable clothing.  Large sweat pants, pajama pants or a robe.  ABSOLUTELY NO JEANS      LEG or FOOT surgery:  wear large loose pants that are able to pass over any large dressings or casts.  You could also wear loose shorts or a skirt.

## 2024-10-07 NOTE — ANESTHESIA PREPROCEDURE EVALUATION
10/07/2024  Juan Neves is a 79 y.o., male scheduled for  INSERTION,NEUROSTIMULATOR,HYPOGLOSSAL (Right: Face) on 10/22/2024.      Past Medical History:   Diagnosis Date    Allergy     Anxiety     Cataract     Hyperlipidemia     Insomnia 2012    Sleep apnea          Past Surgical History:   Procedure Laterality Date    COLONOSCOPY      COLONOSCOPY N/A 01/23/2017    Procedure: COLONOSCOPY;  Surgeon: NASEEM Iqbal MD;  Location: [facility];  Service: Endoscopy;  Laterality: N/A;    EXCISION TURBINATE, SUBMUCOUS      NASAL RECONSTRUCTION      with bilateral  grafts    NASAL SEPTUM SURGERY      SLEEP ENDOSCOPY, DRUG-INDUCED N/A 7/3/2024    Procedure: SLEEP ENDOSCOPY,DRUG-INDUCED;  Surgeon: Aditi Levine MD;  Location: [facility];  Service: ENT;  Laterality: N/A;  RN PREOP 6/24/2024             Pre-op Assessment    I have reviewed the Patient Summary Reports.     I have reviewed the Nursing Notes. I have reviewed the NPO Status.   I have reviewed the Medications.     Review of Systems  Anesthesia Hx:  No problems with previous Anesthesia             Denies Family Hx of Anesthesia complications.    Denies Personal Hx of Anesthesia complications.                    Social:  Former Smoker, Alcohol Use       Hematology/Oncology:  Hematology Normal   Oncology Normal                                   EENT/Dental:  EENT/Dental Normal           Cardiovascular:  Exercise tolerance: good                     Functional Capacity good / => 4 METS                         Pulmonary:        Sleep Apnea     Obstructive Sleep Apnea (JOE).           Renal/:  Renal/ Normal                 Hepatic/GI:  Hepatic/GI Normal                    Musculoskeletal:  Musculoskeletal Normal                Neurological:  Neurology Normal                                      Endocrine:  Endocrine Normal             Dermatological:  Skin Normal    Psych:  Psychiatric Normal                  Physical Exam  General: Well nourished, Cooperative and Alert    Airway:  Mallampati: II   Mouth Opening: Normal  TM Distance: > 6 cm  Neck ROM: Normal ROM    Dental:  Intact    Chest/Lungs:  Normal Respiratory Rate    Heart:  Rate: Normal        Anesthesia Plan  Type of Anesthesia, risks & benefits discussed:    Anesthesia Type: Gen ETT  Intra-op Monitoring Plan: Standard ASA Monitors  Induction:  IV  Informed Consent: Informed consent signed with the Patient and all parties understand the risks and agree with anesthesia plan.  All questions answered.   ASA Score: 2    Ready For Surgery From Anesthesia Perspective.     .

## 2024-10-09 ENCOUNTER — OFFICE VISIT (OUTPATIENT)
Dept: FAMILY MEDICINE | Facility: CLINIC | Age: 79
End: 2024-10-09
Payer: MEDICARE

## 2024-10-09 VITALS
WEIGHT: 225.75 LBS | BODY MASS INDEX: 28.97 KG/M2 | SYSTOLIC BLOOD PRESSURE: 120 MMHG | OXYGEN SATURATION: 94 % | HEIGHT: 74 IN | DIASTOLIC BLOOD PRESSURE: 66 MMHG | HEART RATE: 63 BPM

## 2024-10-09 DIAGNOSIS — E78.00 PURE HYPERCHOLESTEROLEMIA: Primary | Chronic | ICD-10-CM

## 2024-10-09 DIAGNOSIS — F51.01 PRIMARY INSOMNIA: ICD-10-CM

## 2024-10-09 DIAGNOSIS — G47.00 INSOMNIA, UNSPECIFIED TYPE: ICD-10-CM

## 2024-10-09 PROCEDURE — 1126F AMNT PAIN NOTED NONE PRSNT: CPT | Mod: HCNC,CPTII,S$GLB,

## 2024-10-09 PROCEDURE — 3288F FALL RISK ASSESSMENT DOCD: CPT | Mod: HCNC,CPTII,S$GLB,

## 2024-10-09 PROCEDURE — 99397 PER PM REEVAL EST PAT 65+ YR: CPT | Mod: HCNC,S$GLB,,

## 2024-10-09 PROCEDURE — 1101F PT FALLS ASSESS-DOCD LE1/YR: CPT | Mod: HCNC,CPTII,S$GLB,

## 2024-10-09 PROCEDURE — 99999 PR PBB SHADOW E&M-EST. PATIENT-LVL III: CPT | Mod: PBBFAC,HCNC,,

## 2024-10-09 PROCEDURE — 3074F SYST BP LT 130 MM HG: CPT | Mod: HCNC,CPTII,S$GLB,

## 2024-10-09 PROCEDURE — 3078F DIAST BP <80 MM HG: CPT | Mod: HCNC,CPTII,S$GLB,

## 2024-10-09 PROCEDURE — 1159F MED LIST DOCD IN RCRD: CPT | Mod: HCNC,CPTII,S$GLB,

## 2024-10-09 PROCEDURE — 1160F RVW MEDS BY RX/DR IN RCRD: CPT | Mod: HCNC,CPTII,S$GLB,

## 2024-10-09 RX ORDER — ATORVASTATIN CALCIUM 20 MG/1
20 TABLET, FILM COATED ORAL DAILY
Start: 2024-10-09 | End: 2025-10-09

## 2024-10-09 RX ORDER — MIRTAZAPINE 15 MG/1
15 TABLET, FILM COATED ORAL NIGHTLY
Qty: 90 TABLET | Refills: 1 | Status: SHIPPED | OUTPATIENT
Start: 2024-10-09 | End: 2025-10-09

## 2024-10-09 NOTE — PROGRESS NOTES
"                                                      [facility]    10/9/2024      Subjective:       Patient ID:  Juan is a 79 y.o. male being seen for an established visit.    Chief Complaint: Annual Exam    HPI     Interim Hx   ENT for sleep - scheduled for hypoglossal innervation 10/22/2024  Last seen by Blaise 12/2023     Current concerns   Occasional knee pain that is relieved with tylenol     Chronic Concerns   Hypercholesterolemia - Elevated tchol on recent lipid. Previously dc statin. Will restart    Insomnia - scheduled for hypoglossal innervation to improve sleep apnea. Currently uses Remeron   Screenings  Immunizations declined   Last colonoscopy- UTD   Last lipid panel- elevated tchol     Smoker- no   EtOH use- every night, 2 shots of whiskey    Patient Active Problem List   Diagnosis    Insomnia    Dupuytren's contracture of hand    Sleep apnea    Bilateral bunions    Pure hypercholesterolemia    Lactose intolerance    Obesity (BMI 30.0-34.9)       Exercise:  moderately active      Social Hx:  Smoker- no    EtOH use- yes, drinks 1-2 shots of whiskey nightly  Drug use- no      Review of Systems   Constitutional:  Negative for chills and fever.   Respiratory:  Negative for cough and shortness of breath.    Cardiovascular:  Negative for chest pain.   Gastrointestinal:  Negative for abdominal pain, constipation, diarrhea, nausea and vomiting.   Neurological:  Negative for dizziness, light-headedness and headaches.         Last HgbA1C:    No results found for: "HGBA1C"      Last Lipid Panel:    Lab Results   Component Value Date    HDL 61 10/07/2024    HDL 48 10/12/2023    HDL 52 01/10/2023       Lab Results   Component Value Date    LDLCALC 160.8 (H) 10/07/2024    LDLCALC 98.4 10/12/2023    LDLCALC 131.2 01/10/2023       Lab Results   Component Value Date    TRIG 106 10/07/2024    TRIG 143 10/12/2023    TRIG 114 01/10/2023       Lab Results   Component Value Date    " "CHOLHDL 25.1 10/07/2024    CHOLHDL 27.4 10/12/2023    CHOLHDL 25.2 01/10/2023               Review of patient's allergies indicates:   Allergen Reactions    Pcn [penicillins] Other (See Comments)     pt. reports having a "blackout"        Medication List with Changes/Refills   New Medications    ATORVASTATIN (LIPITOR) 20 MG TABLET    Take 1 tablet (20 mg total) by mouth once daily.   Current Medications    FLUTICASONE (FLONASE) 50 MCG/ACTUATION NASAL SPRAY    1 spray by Each Nare route once daily.   Changed and/or Refilled Medications    Modified Medication Previous Medication    MIRTAZAPINE (REMERON) 15 MG TABLET mirtazapine (REMERON) 15 MG tablet       Take 1 tablet (15 mg total) by mouth every evening.    Take 1 tablet (15 mg total) by mouth every evening.               Objective:      /66 (BP Location: Left arm, Patient Position: Sitting)   Pulse 63   Ht 6' 2" (1.88 m)   Wt 102.4 kg (225 lb 12 oz)   SpO2 (!) 94%   BMI 28.98 kg/m²   Estimated body mass index is 28.98 kg/m² as calculated from the following:    Height as of this encounter: 6' 2" (1.88 m).    Weight as of this encounter: 102.4 kg (225 lb 12 oz).  Physical Exam  Vitals reviewed.   Constitutional:       General: He is not in acute distress.     Appearance: Normal appearance.   HENT:      Head: Normocephalic and atraumatic.      Mouth/Throat:      Mouth: Mucous membranes are moist.   Eyes:      Conjunctiva/sclera: Conjunctivae normal.   Cardiovascular:      Rate and Rhythm: Normal rate and regular rhythm.   Pulmonary:      Effort: Pulmonary effort is normal. No respiratory distress.   Abdominal:      General: Bowel sounds are normal. There is no distension.      Palpations: Abdomen is soft.      Tenderness: There is no abdominal tenderness.   Musculoskeletal:         General: Normal range of motion.      Cervical back: Normal range of motion.   Skin:     General: Skin is warm.      Comments: 1 cm papule to R hip   Neurological:      General: " No focal deficit present.      Mental Status: He is alert.           Assessment and Plan:     Pure hypercholesterolemia  -     atorvastatin (LIPITOR) 20 MG tablet; Take 1 tablet (20 mg total) by mouth once daily.  -     Lipid Panel; Standing  -     CBC Auto Differential; Future; Expected date: 10/09/2024  -     Comprehensive Metabolic Panel; Future; Expected date: 10/09/2024    Primary insomnia    Insomnia, unspecified type  -     mirtazapine (REMERON) 15 MG tablet; Take 1 tablet (15 mg total) by mouth every evening.  Dispense: 90 tablet; Refill: 1         Restart Lipitor due to elevated tchol     Refill Remeron for chronic insomnia    FU in 1 year with labs prior or RTC as needed     Follow Up:  Follow up in 1 year for annual          Other Orders Placed This Visit:  Orders Placed This Encounter   Procedures    Lipid Panel    CBC Auto Differential    Comprehensive Metabolic Panel         Candida Simms PA-C

## 2024-10-10 ENCOUNTER — OFFICE VISIT (OUTPATIENT)
Dept: OPTOMETRY | Facility: CLINIC | Age: 79
End: 2024-10-10
Payer: MEDICARE

## 2024-10-10 ENCOUNTER — OFFICE VISIT (OUTPATIENT)
Dept: OPTOMETRY | Facility: CLINIC | Age: 79
End: 2024-10-10
Payer: COMMERCIAL

## 2024-10-10 DIAGNOSIS — H25.13 NUCLEAR SCLEROSIS OF BOTH EYES: ICD-10-CM

## 2024-10-10 DIAGNOSIS — H02.831 DERMATOCHALASIS OF BOTH UPPER EYELIDS: ICD-10-CM

## 2024-10-10 DIAGNOSIS — H02.834 DERMATOCHALASIS OF BOTH UPPER EYELIDS: ICD-10-CM

## 2024-10-10 DIAGNOSIS — H52.4 HYPEROPIA WITH PRESBYOPIA OF BOTH EYES: ICD-10-CM

## 2024-10-10 DIAGNOSIS — Z46.0 FITTING AND ADJUSTMENT OF SPECTACLES AND CONTACT LENSES: Primary | ICD-10-CM

## 2024-10-10 DIAGNOSIS — H52.03 HYPEROPIA WITH PRESBYOPIA OF BOTH EYES: ICD-10-CM

## 2024-10-10 DIAGNOSIS — H04.123 DRY EYE SYNDROME OF BOTH EYES: Primary | ICD-10-CM

## 2024-10-10 PROCEDURE — 99999 PR PBB SHADOW E&M-EST. PATIENT-LVL I: CPT | Mod: PBBFAC,HCNC,, | Performed by: OPTOMETRIST

## 2024-10-10 PROCEDURE — 99999 PR PBB SHADOW E&M-EST. PATIENT-LVL III: CPT | Mod: PBBFAC,,, | Performed by: OPTOMETRIST

## 2024-10-10 RX ORDER — PROPOFOL 10 MG/ML
INJECTION, EMULSION INTRAVENOUS
COMMUNITY
Start: 2024-07-03

## 2024-10-10 RX ORDER — SODIUM CHLORIDE, SODIUM LACTATE, POTASSIUM CHLORIDE, CALCIUM CHLORIDE 600; 310; 30; 20 MG/100ML; MG/100ML; MG/100ML; MG/100ML
INJECTION, SOLUTION INTRAVENOUS
COMMUNITY
Start: 2024-07-03

## 2024-10-10 NOTE — PROGRESS NOTES
KARISSA    CLIFTON: [birth year]  Chief complaint (CC): Patient is here for annual eye exam today.  Patient   has noticed more trouble reading small print.  Street signs are harder to   see.   Patient would like to be free of glasses and contacts and wonders   if surgery is an option.  Glasses? +  Contacts? +  H/o eye surgery, injections or laser: -  H/o eye injury: -  Known eye conditions? See above  Family h/o eye conditions? -  Eye gtts? AT's Q HS OU      (-) Flashes (-)  Floaters (-) Mucous   (-)  Tearing (-) Itching (-) Burning   (-) Headaches (-) Eye Pain/discomfort (-) Irritation   (-)  Redness (-) Double vision (-) Blurry vision    Diabetic? -  A1c? -      Last edited by Brunilda Patel on 10/10/2024  2:59 PM.            Assessment /Plan     For exam results, see Encounter Report.      Dry eye syndrome of both eyes  Recommend Systane Ultra or Refresh Optive BID-TID OU to aid with symptoms of dry eyes.    Nuclear sclerosis of both eyes  Nuclear sclerotic cataract - not visually significant. Observe.    Hyperopia with presbyopia of both eyes  CLRx and SRx released to patient. Patient educated on lens options. Normal ocular health. RTC 1 year for routine exam.     Dermatochalasis of both upper eyelids  Pt feels eyelids are starting to block his vision and affects him playing pickle ball. Referral to oculoplastics.

## 2024-10-12 ENCOUNTER — PATIENT MESSAGE (OUTPATIENT)
Dept: OPTOMETRY | Facility: CLINIC | Age: 79
End: 2024-10-12
Payer: MEDICARE

## 2024-10-21 ENCOUNTER — TELEPHONE (OUTPATIENT)
Dept: SURGERY | Facility: HOSPITAL | Age: 79
End: 2024-10-21
Payer: MEDICARE

## 2024-10-22 ENCOUNTER — HOSPITAL ENCOUNTER (OUTPATIENT)
Facility: HOSPITAL | Age: 79
Discharge: HOME | End: 2024-10-22
Attending: OTOLARYNGOLOGY | Admitting: OTOLARYNGOLOGY
Payer: MEDICARE

## 2024-10-22 VITALS
HEART RATE: 66 BPM | SYSTOLIC BLOOD PRESSURE: 143 MMHG | OXYGEN SATURATION: 97 % | BODY MASS INDEX: 28.9 KG/M2 | RESPIRATION RATE: 18 BRPM | TEMPERATURE: 97 F | DIASTOLIC BLOOD PRESSURE: 71 MMHG | WEIGHT: 225.13 LBS

## 2024-10-22 DIAGNOSIS — Z78.9 DIFFICULTY WITH CPAP USE: ICD-10-CM

## 2024-10-22 DIAGNOSIS — G47.33 OSA (OBSTRUCTIVE SLEEP APNEA): ICD-10-CM

## 2024-10-22 PROCEDURE — 25000003 PHARM REV CODE 250: Mod: HCNC | Performed by: OTOLARYNGOLOGY

## 2024-10-22 PROCEDURE — 36000707: Mod: HCNC | Performed by: OTOLARYNGOLOGY

## 2024-10-22 PROCEDURE — 63600175 PHARM REV CODE 636 W HCPCS: Mod: HCNC | Performed by: OTOLARYNGOLOGY

## 2024-10-22 PROCEDURE — C1767 GENERATOR, NEURO NON-RECHARG: HCPCS | Mod: HCNC | Performed by: OTOLARYNGOLOGY

## 2024-10-22 PROCEDURE — 63600175 PHARM REV CODE 636 W HCPCS: Mod: HCNC | Performed by: STUDENT IN AN ORGANIZED HEALTH CARE EDUCATION/TRAINING PROGRAM

## 2024-10-22 PROCEDURE — 36000706: Mod: HCNC | Performed by: OTOLARYNGOLOGY

## 2024-10-22 PROCEDURE — 64582 OPN MPLTJ HPGLSL NSTM ARY PG: CPT | Mod: HCNC,RT,, | Performed by: OTOLARYNGOLOGY

## 2024-10-22 PROCEDURE — 71000033 HC RECOVERY, INTIAL HOUR: Mod: HCNC | Performed by: OTOLARYNGOLOGY

## 2024-10-22 PROCEDURE — 71000015 HC POSTOP RECOV 1ST HR: Mod: HCNC | Performed by: OTOLARYNGOLOGY

## 2024-10-22 PROCEDURE — 25000003 PHARM REV CODE 250: Mod: HCNC | Performed by: STUDENT IN AN ORGANIZED HEALTH CARE EDUCATION/TRAINING PROGRAM

## 2024-10-22 PROCEDURE — C1778 LEAD, NEUROSTIMULATOR: HCPCS | Mod: HCNC | Performed by: OTOLARYNGOLOGY

## 2024-10-22 PROCEDURE — C1787 PATIENT PROGR, NEUROSTIM: HCPCS | Mod: HCNC | Performed by: OTOLARYNGOLOGY

## 2024-10-22 PROCEDURE — 37000008 HC ANESTHESIA 1ST 15 MINUTES: Mod: HCNC | Performed by: OTOLARYNGOLOGY

## 2024-10-22 PROCEDURE — 63600175 PHARM REV CODE 636 W HCPCS: Mod: JZ,JG,HCNC | Performed by: OTOLARYNGOLOGY

## 2024-10-22 PROCEDURE — 27201423 OPTIME MED/SURG SUP & DEVICES STERILE SUPPLY: Mod: HCNC | Performed by: OTOLARYNGOLOGY

## 2024-10-22 PROCEDURE — 37000009 HC ANESTHESIA EA ADD 15 MINS: Mod: HCNC | Performed by: OTOLARYNGOLOGY

## 2024-10-22 PROCEDURE — 71000016 HC POSTOP RECOV ADDL HR: Mod: HCNC | Performed by: OTOLARYNGOLOGY

## 2024-10-22 DEVICE — GENERATOR PULSE IMPLANTABLE: Type: IMPLANTABLE DEVICE | Site: NECK | Status: FUNCTIONAL

## 2024-10-22 DEVICE — LEAD STIMULATION IMPLANTABLE: Type: IMPLANTABLE DEVICE | Site: CHEST | Status: FUNCTIONAL

## 2024-10-22 RX ORDER — DEXAMETHASONE SODIUM PHOSPHATE 4 MG/ML
INJECTION, SOLUTION INTRA-ARTICULAR; INTRALESIONAL; INTRAMUSCULAR; INTRAVENOUS; SOFT TISSUE
Status: DISCONTINUED | OUTPATIENT
Start: 2024-10-22 | End: 2024-10-22

## 2024-10-22 RX ORDER — OXYMETAZOLINE HCL 0.05 %
SPRAY, NON-AEROSOL (ML) NASAL
Status: DISCONTINUED | OUTPATIENT
Start: 2024-10-22 | End: 2024-10-22 | Stop reason: HOSPADM

## 2024-10-22 RX ORDER — PHENYLEPHRINE HYDROCHLORIDE 10 MG/ML
INJECTION INTRAVENOUS CONTINUOUS PRN
Status: DISCONTINUED | OUTPATIENT
Start: 2024-10-22 | End: 2024-10-22

## 2024-10-22 RX ORDER — EPHEDRINE SULFATE 50 MG/ML
INJECTION, SOLUTION INTRAVENOUS
Status: DISCONTINUED | OUTPATIENT
Start: 2024-10-22 | End: 2024-10-22

## 2024-10-22 RX ORDER — VASOPRESSIN 20 [USP'U]/ML
INJECTION, SOLUTION INTRAMUSCULAR; SUBCUTANEOUS
Status: DISCONTINUED | OUTPATIENT
Start: 2024-10-22 | End: 2024-10-22

## 2024-10-22 RX ORDER — ROCURONIUM BROMIDE 10 MG/ML
INJECTION, SOLUTION INTRAVENOUS
Status: DISCONTINUED | OUTPATIENT
Start: 2024-10-22 | End: 2024-10-22

## 2024-10-22 RX ORDER — SODIUM CHLORIDE 0.9 % (FLUSH) 0.9 %
10 SYRINGE (ML) INJECTION
Status: DISCONTINUED | OUTPATIENT
Start: 2024-10-22 | End: 2024-10-22 | Stop reason: HOSPADM

## 2024-10-22 RX ORDER — LIDOCAINE HYDROCHLORIDE AND EPINEPHRINE 10; 10 MG/ML; UG/ML
INJECTION, SOLUTION INFILTRATION; PERINEURAL
Status: DISCONTINUED | OUTPATIENT
Start: 2024-10-22 | End: 2024-10-22 | Stop reason: HOSPADM

## 2024-10-22 RX ORDER — LIDOCAINE HYDROCHLORIDE 10 MG/ML
1 INJECTION, SOLUTION EPIDURAL; INFILTRATION; INTRACAUDAL; PERINEURAL ONCE
Status: DISCONTINUED | OUTPATIENT
Start: 2024-10-22 | End: 2024-10-22 | Stop reason: HOSPADM

## 2024-10-22 RX ORDER — CLINDAMYCIN PHOSPHATE 900 MG/50ML
900 INJECTION, SOLUTION INTRAVENOUS
Status: COMPLETED | OUTPATIENT
Start: 2024-10-22 | End: 2024-10-22

## 2024-10-22 RX ORDER — HYDROCODONE BITARTRATE AND ACETAMINOPHEN 5; 325 MG/1; MG/1
1 TABLET ORAL EVERY 6 HOURS PRN
Qty: 20 TABLET | Refills: 0 | Status: SHIPPED | OUTPATIENT
Start: 2024-10-22

## 2024-10-22 RX ORDER — PROPOFOL 10 MG/ML
VIAL (ML) INTRAVENOUS
Status: DISCONTINUED | OUTPATIENT
Start: 2024-10-22 | End: 2024-10-22

## 2024-10-22 RX ORDER — ONDANSETRON HYDROCHLORIDE 2 MG/ML
INJECTION, SOLUTION INTRAVENOUS
Status: DISCONTINUED | OUTPATIENT
Start: 2024-10-22 | End: 2024-10-22

## 2024-10-22 RX ORDER — LIDOCAINE HYDROCHLORIDE 20 MG/ML
INJECTION INTRAVENOUS
Status: DISCONTINUED | OUTPATIENT
Start: 2024-10-22 | End: 2024-10-22

## 2024-10-22 RX ORDER — FENTANYL CITRATE 50 UG/ML
INJECTION, SOLUTION INTRAMUSCULAR; INTRAVENOUS
Status: DISCONTINUED | OUTPATIENT
Start: 2024-10-22 | End: 2024-10-22

## 2024-10-22 RX ORDER — HYDROCODONE BITARTRATE AND ACETAMINOPHEN 5; 325 MG/1; MG/1
1 TABLET ORAL EVERY 6 HOURS PRN
Status: DISCONTINUED | OUTPATIENT
Start: 2024-10-22 | End: 2024-10-22 | Stop reason: HOSPADM

## 2024-10-22 RX ORDER — SUCCINYLCHOLINE CHLORIDE 20 MG/ML
INJECTION INTRAMUSCULAR; INTRAVENOUS
Status: DISCONTINUED | OUTPATIENT
Start: 2024-10-22 | End: 2024-10-22

## 2024-10-22 RX ORDER — GLUCAGON 1 MG
1 KIT INJECTION
Status: DISCONTINUED | OUTPATIENT
Start: 2024-10-22 | End: 2024-10-22 | Stop reason: HOSPADM

## 2024-10-22 RX ORDER — SODIUM CHLORIDE 0.9 % (FLUSH) 0.9 %
10 SYRINGE (ML) INJECTION ONCE
Status: DISCONTINUED | OUTPATIENT
Start: 2024-10-22 | End: 2024-10-22 | Stop reason: HOSPADM

## 2024-10-22 RX ORDER — HYDROMORPHONE HYDROCHLORIDE 2 MG/ML
0.2 INJECTION, SOLUTION INTRAMUSCULAR; INTRAVENOUS; SUBCUTANEOUS EVERY 5 MIN PRN
Status: DISCONTINUED | OUTPATIENT
Start: 2024-10-22 | End: 2024-10-22 | Stop reason: HOSPADM

## 2024-10-22 RX ADMIN — PHENYLEPHRINE HYDROCHLORIDE 0.2 MCG/KG/MIN: 10 INJECTION INTRAVENOUS at 08:10

## 2024-10-22 RX ADMIN — ROCURONIUM BROMIDE 50 MG: 10 INJECTION INTRAVENOUS at 08:10

## 2024-10-22 RX ADMIN — PROPOFOL 200 MG: 10 INJECTION, EMULSION INTRAVENOUS at 08:10

## 2024-10-22 RX ADMIN — SUCCINYLCHOLINE CHLORIDE 100 MG: 20 INJECTION, SOLUTION INTRAMUSCULAR; INTRAVENOUS at 08:10

## 2024-10-22 RX ADMIN — GLYCOPYRROLATE 0.2 MG: 0.2 INJECTION, SOLUTION INTRAMUSCULAR; INTRAVITREAL at 08:10

## 2024-10-22 RX ADMIN — CLINDAMYCIN PHOSPHATE 900 MG: 18 INJECTION, SOLUTION INTRAVENOUS at 08:10

## 2024-10-22 RX ADMIN — REMIFENTANIL HYDROCHLORIDE 0.1 MCG/KG/MIN: 1 INJECTION, POWDER, LYOPHILIZED, FOR SOLUTION INTRAVENOUS at 08:10

## 2024-10-22 RX ADMIN — ONDANSETRON 4 MG: 2 INJECTION, SOLUTION INTRAMUSCULAR; INTRAVENOUS at 08:10

## 2024-10-22 RX ADMIN — LIDOCAINE HYDROCHLORIDE 100 MG: 20 INJECTION, SOLUTION INTRAVENOUS at 08:10

## 2024-10-22 RX ADMIN — LIDOCAINE HYDROCHLORIDE 60 MG: 20 INJECTION, SOLUTION INTRAVENOUS at 10:10

## 2024-10-22 RX ADMIN — SUGAMMADEX 200 MG: 100 INJECTION, SOLUTION INTRAVENOUS at 09:10

## 2024-10-22 RX ADMIN — FENTANYL CITRATE 50 MCG: 50 INJECTION, SOLUTION INTRAMUSCULAR; INTRAVENOUS at 08:10

## 2024-10-22 RX ADMIN — EPHEDRINE SULFATE 10 MG: 50 INJECTION INTRAVENOUS at 08:10

## 2024-10-22 RX ADMIN — EPHEDRINE SULFATE 5 MG: 50 INJECTION INTRAVENOUS at 09:10

## 2024-10-22 RX ADMIN — SODIUM CHLORIDE, SODIUM LACTATE, POTASSIUM CHLORIDE, AND CALCIUM CHLORIDE: .6; .31; .03; .02 INJECTION, SOLUTION INTRAVENOUS at 08:10

## 2024-10-22 RX ADMIN — DEXAMETHASONE SODIUM PHOSPHATE 12 MG: 4 INJECTION, SOLUTION INTRAMUSCULAR; INTRAVENOUS at 08:10

## 2024-10-22 RX ADMIN — VASOPRESSIN 1 UNITS: 20 INJECTION, SOLUTION INTRAMUSCULAR; SUBCUTANEOUS at 08:10

## 2024-10-22 NOTE — PROGRESS NOTES
Mr. Neves was seen in the pre-op holding area ([address]) to discuss and instructions given for his incoming appointment with me on 11/20/24 for the Inspire activation. He was instructed to download the Inspire sleep sync nikki. I had explained to him and his spouse (seating at bedside) the purpose of monitoring the progress of his therapy once activated. We also discussed the importance of self titration for 3 months, and the fine tune sleep study.     Sleep history reported by the patient:  He struggled with different mask, he felt that the PAP kept him awake. He stopped using it after few moths struggling with the mask.  He endorse snoring.  He rotates sleeping position.  Sleep latency: 10 to 15 minutes  AHI: 44.5 (Hypop rule 1B). 28 CA were reported mostly on supine sleep.    The patient have an appointment to see ENT for follow up.

## 2024-10-22 NOTE — ANESTHESIA POSTPROCEDURE EVALUATION
Anesthesia Post Evaluation    Patient: Juan Neves    Procedure(s) Performed: Procedure(s) (LRB):  INSERTION,NEUROSTIMULATOR,HYPOGLOSSAL (Right)    Final Anesthesia Type: general      Patient location during evaluation: PACU  Patient participation: Yes- Able to Participate  Level of consciousness: awake and alert and oriented  Post-procedure vital signs: reviewed and stable  Pain management: adequate  Airway patency: patent    PONV status at discharge: No PONV  Anesthetic complications: no      Cardiovascular status: blood pressure returned to baseline, hemodynamically stable and stable  Respiratory status: unassisted, spontaneous ventilation and room air  Hydration status: euvolemic  Follow-up not needed.              Vitals Value Taken Time   /71 10/22/24 1234   Temp 36.2 °C (97.2 °F) 10/22/24 1234   Pulse 66 10/22/24 1234   Resp 18 10/22/24 1234   SpO2 97 % 10/22/24 1234         Event Time   Out of Recovery 11:32:00         Pain/Sg Score: Sg Score: 10 (10/22/2024 11:36 AM)  Modified Sg Score: 20 (10/22/2024 12:34 PM)

## 2024-10-22 NOTE — H&P
"[facility]  Otorhinolaryngology-Head & Neck Surgery  History & Physical    Patient Name: Juan Neves  MRN: [id number]  Admission Date: 10/22/2024  Attending Physician: Aditi Levine MD  Primary Care Provider: Dillan Welsh III, MD      Subjective:     Chief Complaint/Reason for Admission:  here for surgery     History of Present Illness:  79 y.o. male presents with suresh, intolerant to cpap  Have discussed with him about inspire in detail, has undergone dise. Is aware of potential issues related to central/mixed apnea and inspire    No current facility-administered medications on file prior to encounter.     Current Outpatient Medications on File Prior to Encounter   Medication Sig    fluticasone (FLONASE) 50 mcg/actuation nasal spray 1 spray by Each Nare route once daily.       Review of patient's allergies indicates:   Allergen Reactions    Pcn [penicillins] Other (See Comments)     pt. reports having a "blackout"       Past Medical History:   Diagnosis Date    Allergy     Anxiety     Cataract     Hyperlipidemia     Insomnia 2012    Sleep apnea      Past Surgical History:   Procedure Laterality Date    COLONOSCOPY      COLONOSCOPY N/A 01/23/2017    Procedure: COLONOSCOPY;  Surgeon: NASEEM Iqbal MD;  Location: [facility];  Service: Endoscopy;  Laterality: N/A;    EXCISION TURBINATE, SUBMUCOUS      NASAL RECONSTRUCTION      with bilateral  grafts    NASAL SEPTUM SURGERY      SLEEP ENDOSCOPY, DRUG-INDUCED N/A 7/3/2024    Procedure: SLEEP ENDOSCOPY,DRUG-INDUCED;  Surgeon: Aditi Levine MD;  Location: [facility] OR;  Service: ENT;  Laterality: N/A;  RN PREOP 6/24/2024     Family History       Problem Relation (Age of Onset)    Acne Daughter    Cancer Father, Sister    Diabetes Mother, Sister    No Known Problems Daughter          Tobacco Use    Smoking status: Former     Types: Cigars     Passive exposure: Never    Smokeless tobacco: Never    Tobacco comments:     Pt smokes cigars " periodically (6x's a year)   Substance and Sexual Activity    Alcohol use: Yes     Alcohol/week: 8.0 standard drinks of alcohol     Types: 3 Glasses of wine, 1 Cans of beer, 4 Shots of liquor per week     Comment:  ( 1 glass of wine and 1 scotch)    Drug use: No    Sexual activity: Not Currently     Partners: Female     Birth control/protection: None     Review of Systems   Constitutional:  Negative for fever.   HENT:  Negative for sore throat.    Respiratory:  Negative for cough.    Musculoskeletal:  Negative for neck pain.     Objective:     Vital Signs (Most Recent):  Temp: 98.2 °F (36.8 °C) (10/22/24 0643)  Pulse: 61 (10/22/24 0643)  Resp: 16 (10/22/24 0643)  BP: (!) 166/83 (10/22/24 0643)  SpO2: 97 % (10/22/24 0643) Vital Signs (24h Range):  Temp:  [98.2 °F (36.8 °C)] 98.2 °F (36.8 °C)  Pulse:  [61] 61  Resp:  [16] 16  SpO2:  [97 %] 97 %  BP: (166)/(83) 166/83     Weight: 102.1 kg (225 lb 1.9 oz)  Body mass index is 28.9 kg/m².    Physical Exam  HENT:      Head: Normocephalic.      Mouth/Throat:      Mouth: Mucous membranes are moist.   Eyes:      General: No scleral icterus.  Neck:      Trachea: Trachea and phonation normal.   Pulmonary:      Effort: Pulmonary effort is normal.      Breath sounds: No stridor.   Neurological:      Mental Status: He is alert.         Significant Labs:  Cmp and cbc reviewed    Significant Diagnostics:  none    Assessment/Plan:   Claudio  Difficulty tolerating cpap  VTE Risk Mitigation (From admission, onward)           Ordered     IP VTE LOW RISK PATIENT  Once         10/22/24 0643     Place sequential compression device  Until discontinued         10/22/24 0643                  To OR for dev Levine MD  Otorhinolaryngology-Head & Neck Surgery  [facility]

## 2024-10-22 NOTE — DISCHARGE INSTRUCTIONS
Leave dressing on for 48 hours. Can remove dressing after 48 hours and shower at that time.place ice pack over dressing on area of incision for 15-20 minutes 3 times per day. After dressing removed, continue to ice incision 15-20 minutes for 3 times a day. Can wrap ice in cloth and place over incision line. Can also ice incision as needed ( in addition to the 3 times per day) for comfort or swelling. When able to shower ( after remove dressing in 48 hours) Do not stand with incision directly under shower head but allow soapy water to run over incision line to keep wound clean      After surgery you may have some blood tinge in your saliva. This is normal and should improve over the next few days.    If you have any neck discomfort or stiffness can do neck rolls by gently rolling your head from side to side     If you have any problems or questions please call the following numbers :  Office hours:  Weekdays 8:00 am to 5:00 pm.  Please call [phone] and ask to speak with  my medical assistant, Monica or my nurse nik at the [facility].    After-hours & weekends:  Please call [phone] and ask to speak with the ENT resident doctor.    Please call IMMMEDIATELY if you have:  Temperature of 101° F or greater  Swelling of neck or foul smelling drainage  Chest swelling  Difficulty breathing  Pain not relieved by your prescribed pain medication      MEDICATIONS:  Most people need pain medication for after surgery. The best pain control comes from a combination of ACETAMINOPHEN (Tylenol) and IBUPROFEN (Motrin).  You can use any over-the-counter versions of acetaminophen and ibuprofen.  You should take 400 milligrams of ibuprofen every 6 hours and 650 milligrams of tylenol every 6 hours. These can be alternated so that you are taking something about every 3 hours while awake. If you need the narcotic pain medication, substitute this out for the acetaminophen. You should try to only use the narcotic  medication if you are having trouble sleeping. This medication has a high addiction potential and we do not have a way to know who is at risk for getting addicted to narcotic pain medication. If you have been advised by another physician to not take either of these medications , do not take.    Some people have problems with bowel movements after surgery. If you have NOT had a bowel movement 3 days after surgery, go to your local pharmacy and purchase an over the counter stool softener such as COLACE. You can also ask the pharmacist for his or her recommendation. If you still do not have a bowel movement after starting the softener, please call the office.     ACTIVITY:  It is important to walk around often while at home to keep your blood circulating and prevent blood clots.      RESTRICTED ACTIVITIES AFTER SURGERY:  AVOID all heavy lifting, straining or bending for 2 weeks .   AVOID semi-contact sports or vigorous exercising for 3-4 weeks.   Do NOT operate a motor vehicle or any type of heavy machinery within 24 hours of taking pain medication.  DO NOT smoke or be around smokers.            Fall Prevention  Millions of people fall every year and injure themselves. You may have had anesthesia or sedation which may increase your risk of falling. You may have health issues that put you at an increased risk of falling.     Here are ways to reduce your risk of falling.    Make your home safe by keeping walkways clear of objects you may trip over.  Use non-slip pads under rugs. Do not use area rugs or small throw rugs.  Use non-slip mats in bathtubs and showers.  Install handrails and lights on staircases.  Do not walk in poorly lit areas.  Do not stand on chairs or wobbly ladders.  Use caution when reaching overhead or looking upward. This position can cause a loss of balance.  Be sure your shoes fit properly, have non-slip bottoms and are in good condition.   Wear shoes both inside and out. Avoid going barefoot or  wearing slippers.  Be cautious when going up and down stairs, curbs, and when walking on uneven sidewalks.  If your balance is poor, consider using a cane or walker.  If your fall was related to alcohol use, stop or limit alcohol intake.   If your fall was related to use of sleeping medicines, talk to your doctor about this. You may need to reduce your dosage at bedtime if you awaken during the night to go to the bathroom.    To reduce the need for nighttime bathroom trips:  Avoid drinking fluids for several hours before going to bed  Empty your bladder before going to bed  Men can keep a urinal at the bedside  Stay as active as you can. Balance, flexibility, strength, and endurance all come from exercise. They all play a role in preventing falls. Ask your healthcare provider which types of activity are right for you.  Get your vision checked on a regular basis.  If you have pets, know where they are before you stand up or walk so you don't trip over them.  Use night lights.

## 2024-10-22 NOTE — OP NOTE
[facility]  Surgery Department  Operative Note    SUMMARY     Date of Procedure: 10/22/2024     Procedure: Procedure(s) (LRB):  INSERTION,NEUROSTIMULATOR,HYPOGLOSSAL (Right)  (CPT 43132), aka BRENNEN    Surgeons and Role:     * Aditi Levine MD - Primary    Assisting Surgeon: None    Pre-Operative Diagnosis: JOE (obstructive sleep apnea) [G47.33]  Difficulty with CPAP use [Z78.9]    Post-Operative Diagnosis: Post-Op Diagnosis Codes:     * JOE (obstructive sleep apnea) [G47.33]     * Difficulty with CPAP use [Z78.9]    Anesthesia: General    Operative Findings (including complications, if any): stimulation forward down to 0.8 volts. No retraction ; alannah vein not ligated. Excellent sense lead wave Normal anatomy otherwise      Brief Clinical History:  This is a 79-year-old patient with a history of moderate to severeobstructive sleep apnea with an associated BMI of 28.90.  This patient is intolerant and unable to achieve benefit from positive pressure therapy. Patient has passed the clinical, polysomnographic, and endoscopic screening criteria and presents today for the implant.    Operative Report  in Detail:   The patient was brought to the Operating Room and was anesthetized via general endotracheal anesthesia without complication.  A shoulder roll was placed and the patient was prepped and draped in usual sterile fashion with the head turned to the left. Prior to prepping and draping, electrodes were placed in the genioglossus and hyoglossus muscle and connected to the NIM box for intraoperative nerve monitoring. A time out was performed and the correct patient and procedure were identified.     A modified sub-mandibular incision was made in the right upper neck approximately 2 cm below the mandible. Dissection was carried down through the subcutaneous tissue and platysma. The anterior/inferior border of the submandibular gland was identified as well as the digastric tendon. The submandibular gland  and the overlying fascia with the marginal mandibular nerve were retracted posteriorly.  The digastric tendon was retracted inferiorly using vessel loops.  Dissection continued down into the digastric triangle and the posterior border of the mylohyoid muscle was freed up and retracted anteriorly. With balanced retraction, the hypoglossal nerve was identified in its usual fashion and was   dissected up towards the floor of the mouth.      The superior/posterior branches innervating the hyoglossus muscle were identified using the NIM stimulator and anatomical cues.  The cuff electrode for the hypoglossal nerve stimulator (; ) was placed distally to these branches innervating genioglossus, transverse, and vertical muscles. The cuff was then flushed with normal saline using an angiocatheter. The stimulation lead was anchored to the digastric tendon using two 3.0 silk sutures and lead body slack between the cuff and the anchor gently tucked deep to the submandibular gland.    A second 5 cm incision was made in the right upper chest over the second intercostal space, approximately 3 cm lateral to the sternal margin and 7 cm inferior to the sternal notch. Dissection was carried down through the skin and subcutaneous tissue to the fascia of the pectoralis major muscle. An inferior pocket for the generator was created deep to the subcutaneous layer and superficial to the fascia of the pectoralis muscle.  The pectoralis major fascia was dissected directly over the second intercostal space with subsequent blunt dissection through the muscle.The pectoralis major/minor was then retracted to expose the fatty layer just superficial to the external intercostals. The fatty layer was carefully swept away to expose the external intercostal muscles.     A throw-down base knot was placed to the fascia of the external intercostals just lateral to the anterior external membrane using 3.0 silk suture. A fasciotomy through the  external intercostals was performed approximately 5 mm lateral to the suture knot and the respiratory sense lead (; ) was advanced with the sensor facing the pleura into the interfascial plane between the external and internal intercostals. The primary anchor was sutured into place with 3.0 silk on the external intercostals. The secondary anchor was sutured with 3.0 silk to the pectoralis major allowing adequate slack between the anchors.The stimulation lead was then tunneled in a subplatysmal plane with blunt dissection under direct visualization and brought out into the sub-clavicular pocket where both the stimulation lead and the respiratory sensing lead were connected to the implantable pulse generator, using the two person, three-handed approach.    The implantable pulse generator (; ) was placed in the subclavicular pocket ensuring lead body was deep to the generator and secured with use of air knots to the pectoralis fascia using 2.0 silk sutures.  Diagnostic evaluation confirmed good placement of the stimulation cuff as demonstrated by activation of the genioglossus and transverse and vertical muscles, resulting in unhindered, stiffened tongue protrusion, confirmed visually. Diagnostic evaluation also confirmed good respiratory sensor placement as demonstrated by a sensing waveform with good rise and fall associated with patient respirations.     All the wounds were thoroughly irrigated and closed in  layers with deep 3-0 and 4-0 vicryl sutures and a 5-0 monocryl subcuticular closure.  Dermabond was applied on the skin and  pressure dressings using fluffed 4x4 and medpore tape were placed over the incision.The patient was then awakened, extubated, and transferred to Recovery Room in stable condition.      I was present for and performed the entire procedure.    Complications: None      Significant Surgical Tasks Conducted by the Assistant(s), if Applicable: none    Estimated Blood Loss:  20 cc  Implants:   Implant Name Type Inv. Item Serial No.  Lot No. LRB No. Used Action   STIMULATION LEAD FOR OBSTUCTIVE SLEEP APNEA   [id number]   Right 1 Implanted   respiratory sensing lead for obstructive sleep apnea   [id number]   Right 1 Implanted   implantable pulse generator for obstructive sleep apena   [id number]   Right 1 Implanted       Specimens:   Specimen (24h ago, onward)      None                    Condition: Good    Disposition: PACU - hemodynamically stable.    Attestation: Op Note Attestation: I was physically present and scrubbed for the entire procedure.

## 2024-10-22 NOTE — TRANSFER OF CARE
Anesthesia Transfer of Care Note    Patient: Juan Neves    Procedure(s) Performed: Procedure(s) (LRB):  INSERTION,NEUROSTIMULATOR,HYPOGLOSSAL (Right)    Patient location: PACU    Anesthesia Type: general    Transport from OR: Transported from OR on 6-10 L/min O2 by face mask with adequate spontaneous ventilation    Post pain: adequate analgesia    Post assessment: no apparent anesthetic complications    Post vital signs: stable    Level of consciousness: lethargic    Nausea/Vomiting: no nausea/vomiting    Complications: none    Transfer of care protocol was followed      Last vitals: Visit Vitals  BP (!) 125/58 (Patient Position: Lying)   Pulse 66   Temp 36.2 °C (97.2 °F) (Temporal)   Resp 18   Wt 102.1 kg (225 lb 1.9 oz)   SpO2 97%   BMI 28.90 kg/m²

## 2024-10-22 NOTE — ANESTHESIA PROCEDURE NOTES
Intubation    Date/Time: 10/22/2024 8:19 AM    Performed by: Heri Welch CRNA  Authorized by: Tracy Cosme MD    Intubation:     Induction:  Intravenous    Intubated:  Postinduction    Mask Ventilation:  Easy with oral airway    Attempts:  1    Attempted By:  CRNA    Method of Intubation:  Video laryngoscopy    Blade:  Mark 3    Laryngeal View Grade: Grade I - full view of cords      Difficult Airway Encountered?: No      Complications:  None    Airway Device:  Oral endotracheal tube    Airway Device Size:  7.5    Style/Cuff Inflation:  Cuffed (inflated to minimal occlusive pressure)    Tube secured:  23    Secured at:  The lips    Placement Verified By:  Capnometry    Complicating Factors:  None    Findings Post-Intubation:  BS equal bilateral

## 2024-10-22 NOTE — BRIEF OP NOTE
[facility] - [place] - Surgery  Brief Operative Note    Surgery Date: 10/22/2024     Surgeons and Role:     * Aditi Levine MD - Primary    Assisting Surgeon: None    Pre-op Diagnosis:  JOE (obstructive sleep apnea) [G47.33]  Difficulty with CPAP use [Z78.9]    Post-op Diagnosis:  Post-Op Diagnosis Codes:     * JOE (obstructive sleep apnea) [G47.33]     * Difficulty with CPAP use [Z78.9]    Procedure(s) (LRB):  INSERTION,NEUROSTIMULATOR,HYPOGLOSSAL (Right)    Anesthesia: General    Operative Findings: stimulation forward down to 0.8 volts. No retraction ; alannah vein not ligated. Excellent sense lead wave Normal anatomy otherwise    Estimated Blood Loss: 20 cc         Specimens:   Specimen (24h ago, onward)      None              Discharge Note    OUTCOME: Patient tolerated treatment/procedure well without complication and will be discharged when meets pacu criteria.    DISPOSITION: Home or Self Care    FINAL DIAGNOSIS:  <principal problem not specified>    FOLLOWUP: In clinic    DISCHARGE INSTRUCTIONS:  No discharge procedures on file.

## 2024-10-31 ENCOUNTER — OFFICE VISIT (OUTPATIENT)
Dept: OTOLARYNGOLOGY | Facility: CLINIC | Age: 79
End: 2024-10-31
Payer: MEDICARE

## 2024-10-31 DIAGNOSIS — G47.33 OSA (OBSTRUCTIVE SLEEP APNEA): ICD-10-CM

## 2024-10-31 DIAGNOSIS — Z96.82 S/P INSERTION OF HYPOGLOSSAL NERVE STIMULATOR: Primary | ICD-10-CM

## 2024-10-31 PROCEDURE — 99024 POSTOP FOLLOW-UP VISIT: CPT | Mod: S$GLB,,, | Performed by: OTOLARYNGOLOGY

## 2024-10-31 PROCEDURE — 1159F MED LIST DOCD IN RCRD: CPT | Mod: CPTII,S$GLB,, | Performed by: OTOLARYNGOLOGY

## 2024-11-15 ENCOUNTER — TELEPHONE (OUTPATIENT)
Dept: SLEEP MEDICINE | Facility: HOSPITAL | Age: 79
End: 2024-11-15
Payer: MEDICARE

## 2024-11-20 ENCOUNTER — HOSPITAL ENCOUNTER (OUTPATIENT)
Dept: SLEEP MEDICINE | Facility: HOSPITAL | Age: 79
Discharge: HOME | End: 2024-11-20
Attending: OTOLARYNGOLOGY
Payer: MEDICARE

## 2024-11-20 DIAGNOSIS — Z78.9 DIFFICULTY WITH CPAP USE: ICD-10-CM

## 2024-11-20 DIAGNOSIS — G47.33 OSA (OBSTRUCTIVE SLEEP APNEA): ICD-10-CM

## 2024-11-20 PROCEDURE — 95977 ALYS CPLX CN NPGT PRGRMG: CPT | Mod: HCNC

## 2024-11-20 NOTE — PATIENT INSTRUCTIONS
Your Inspire stimulator was activated today. You can start using it starting tonight, and every night. Please go up on your remote one level a week (Wednesdays). I will call and follow up with you on 12/2/24 at 2:00 PM and check how you are progressing. You have an appointment to see  on 1/23/25 for follow up visit. At any time if you a have any questions or concern, please call us at [phone].

## 2024-11-20 NOTE — PROGRESS NOTES
Your Inspire stimulator was activated today. The implant date was 10/22/24. The surgical sites were inspected , and healed well. No signs of infection noted. The tongue motion observed with good movements. There are no report of any discomfort,swallowing, and speech issue. He was instructed and educated on how to use her Inspire remote. Return demonstration observed. We discussed the importance of self titration and fine tune sleep study    Inspire settings:  Sensation level: 0.6  First reported at level 0.8, lowered it down to 0.6  Functional level: 0.7  The initial tongue forward motion observed at 0.6    Upper limit: 1.7  Start delay: 30 minutes  Pause time: 15 minutes  Duration: 8 hours    Follow up call: 12/2/24  RTC: 1/23/25    His Inspire card was scanned in EPIC

## 2024-11-27 ENCOUNTER — CLINICAL SUPPORT (OUTPATIENT)
Dept: FAMILY MEDICINE | Facility: CLINIC | Age: 79
End: 2024-11-27
Payer: MEDICARE

## 2024-11-27 DIAGNOSIS — Z23 NEED FOR VACCINATION: Primary | ICD-10-CM

## 2024-12-02 ENCOUNTER — TELEPHONE (OUTPATIENT)
Dept: SLEEP MEDICINE | Facility: HOSPITAL | Age: 79
End: 2024-12-02
Payer: MEDICARE

## 2025-01-06 ENCOUNTER — TELEPHONE (OUTPATIENT)
Dept: SLEEP MEDICINE | Facility: HOSPITAL | Age: 80
End: 2025-01-06
Payer: MEDICARE

## 2025-01-06 NOTE — TELEPHONE ENCOUNTER
Inspire usage: Every night  Subjective benefits: sleeping better, wakes up towards the end of the night. Able to fall back to sleep.  Current Inspire remote settin ( he was activated in November, he should be at least 6 now).   Any snoring at this level?  Any discomfort at this level? No  Any problem with swallowing?  Any problem with speech?  Please bring your Inspire remote in during appointment:     Encouraged to step up this Thursday night one level, and monitor if it will do the same. He may have to go up couple of more times. Setting may be too low now,and when he reached deep sleep, it ambrosio not be enough?     He have an appointment with Dr. Levine in two weeks.

## 2025-01-22 ENCOUNTER — TELEPHONE (OUTPATIENT)
Dept: OTOLARYNGOLOGY | Facility: CLINIC | Age: 80
End: 2025-01-22
Payer: MEDICARE

## 2025-01-28 ENCOUNTER — OFFICE VISIT (OUTPATIENT)
Dept: OTOLARYNGOLOGY | Facility: CLINIC | Age: 80
End: 2025-01-28
Payer: MEDICARE

## 2025-01-28 VITALS
HEIGHT: 74 IN | SYSTOLIC BLOOD PRESSURE: 147 MMHG | WEIGHT: 225.06 LBS | DIASTOLIC BLOOD PRESSURE: 76 MMHG | BODY MASS INDEX: 28.88 KG/M2

## 2025-01-28 DIAGNOSIS — Z96.82 S/P INSERTION OF HYPOGLOSSAL NERVE STIMULATOR: Primary | ICD-10-CM

## 2025-01-28 DIAGNOSIS — G47.33 OSA (OBSTRUCTIVE SLEEP APNEA): ICD-10-CM

## 2025-01-28 PROCEDURE — 1101F PT FALLS ASSESS-DOCD LE1/YR: CPT | Mod: CPTII,S$GLB,, | Performed by: OTOLARYNGOLOGY

## 2025-01-28 PROCEDURE — 99213 OFFICE O/P EST LOW 20 MIN: CPT | Mod: S$GLB,,, | Performed by: OTOLARYNGOLOGY

## 2025-01-28 PROCEDURE — 1126F AMNT PAIN NOTED NONE PRSNT: CPT | Mod: CPTII,S$GLB,, | Performed by: OTOLARYNGOLOGY

## 2025-01-28 PROCEDURE — 3288F FALL RISK ASSESSMENT DOCD: CPT | Mod: CPTII,S$GLB,, | Performed by: OTOLARYNGOLOGY

## 2025-01-28 PROCEDURE — 3078F DIAST BP <80 MM HG: CPT | Mod: CPTII,S$GLB,, | Performed by: OTOLARYNGOLOGY

## 2025-01-28 PROCEDURE — 1159F MED LIST DOCD IN RCRD: CPT | Mod: CPTII,S$GLB,, | Performed by: OTOLARYNGOLOGY

## 2025-01-28 PROCEDURE — 3077F SYST BP >= 140 MM HG: CPT | Mod: CPTII,S$GLB,, | Performed by: OTOLARYNGOLOGY

## 2025-01-28 NOTE — PROGRESS NOTES
OTOLARYNGOLOGY CLINIC NOTE  Date:  01/28/2025     Chief complaint:  Chief Complaint   Patient presents with    Sleep Apnea     9-10 week f/u- after activation- implant-10/22/2024. Having issue around 4am with feeling the movement of the tongue            History of Present Illness  Juan Neves is a 79 y.o. male  presenting today for a followup.  S/p inspire 10-22-24     Sometimes at 4 am wakes him up because strong but since being on the level is not as bothersome anymore   He is a light sleeper  Range is 0.7 to 1.7 ; today he is at 1.2 volts.   He has had to get a mouthguard because his teeth are sharp and it hurts his tongue with the teeth     Would like longer pause time     Has been using mirtazipine which he was taking before hand    Feels like needs more sleep but has been trying to get 6 hours  ; feels better sleep but still tired     Once had tongue pain and then chest hurt for like 15 minutes but only happened once       Past Medical History  Past Medical History:   Diagnosis Date    Allergy     Anxiety     Cataract     Hyperlipidemia     Insomnia 2012    Sleep apnea         Past Surgical History  Past Surgical History:   Procedure Laterality Date    COLONOSCOPY      COLONOSCOPY N/A 01/23/2017    Procedure: COLONOSCOPY;  Surgeon: NASEEM Iqbal MD;  Location: [facility];  Service: Endoscopy;  Laterality: N/A;    EXCISION TURBINATE, SUBMUCOUS      INSERTION, NEUROSTIMULATOR, HYPOGLOSSAL Right 10/22/2024    Procedure: INSERTION,NEUROSTIMULATOR,HYPOGLOSSAL;  Surgeon: Aditi Levine MD;  Location: [facility] OR;  Service: ENT;  Laterality: Right;  MARIKA ALBERT [phone]; NEUROMONITORING RANDI WOOD [phone]  RN PREOP 10/07/2024---    NASAL RECONSTRUCTION      with bilateral  grafts    NASAL SEPTUM SURGERY      SLEEP ENDOSCOPY, DRUG-INDUCED N/A 7/3/2024    Procedure: SLEEP ENDOSCOPY,DRUG-INDUCED;  Surgeon: Aditi Levine MD;  Location: [facility] OR;  Service: ENT;  Laterality:  N/A;  RN PREOP 6/24/2024        Medications  Current Outpatient Medications on File Prior to Visit   Medication Sig Dispense Refill    atorvastatin (LIPITOR) 20 MG tablet Take 1 tablet (20 mg total) by mouth once daily.      fluticasone (FLONASE) 50 mcg/actuation nasal spray 1 spray by Each Nare route once daily. 3 Bottle 3    mirtazapine (REMERON) 15 MG tablet Take 1 tablet (15 mg total) by mouth every evening. 90 tablet 1    DIPRIVAN 10 mg/mL infusion       HYDROcodone-acetaminophen (NORCO) 5-325 mg per tablet Take 1 tablet by mouth every 6 (six) hours as needed for Pain. 20 tablet 0    Ringer's solution,lactated (LACTATED RINGERS) infusion        No current facility-administered medications on file prior to visit.       Review of Systems  Review of Systems   Constitutional: Negative.    HENT: Negative.     Eyes: Negative.    Cardiovascular: Negative.    Gastrointestinal:  Positive for diarrhea and heartburn.   Skin: Negative.    Neurological: Negative.           Answers submitted by the patient for this visit:  Review of Symptoms Questionnaire  (Submitted on 1/23/2025)  Sleep Apnea?: Yes  Urinating too frequently?: Yes  sleep disturbance: Yes  Social History   reports that he has quit smoking. His smoking use included cigars. He has never been exposed to tobacco smoke. He has never used smokeless tobacco. He reports current alcohol use of about 8.0 standard drinks of alcohol per week. He reports that he does not use drugs.     Family History  Family History   Problem Relation Name Age of Onset    Diabetes Mother Mom     Cancer Father Dad     Diabetes Sister Sis     Cancer Sister Sis     Acne Daughter Nisha     No Known Problems Daughter      Coronary artery disease Neg Hx      Heart disease Neg Hx      Melanoma Neg Hx      Psoriasis Neg Hx      Eczema Neg Hx      Lupus Neg Hx          Physical Exam   Vitals:    01/28/25 1519   BP: (!) 147/76    Body mass index is 28.9 kg/m².  Weight: 102.1 kg (225 lb 1.4 oz)  "  Height: 6' 2" (188 cm)     GENERAL: no acute distress.  HEAD: normocephalic.   EYES: No scleral icterus  EARS: external ear without lesion, normal pinna shape and position.  External auditory canal with normal cerumen, tympanic membrane fully visible, no perforation , no retraction. No middle ear effusion. Ossicles intact.   NOSE: external nose without significant bony abnormality  ORAL CAVITY/OROPHARYNX: tongue mobile.   NECK: trachea midline.   LYMPH NODES:No cervical lymphadenopathy.  RESPIRATORY: no stridor, no stertor. Voice normal. Respirations nonlabored.  NEURO: alert, responds to questions appropriately.    PSYCH:mood appropriate      Imaging:  The patient does not have any new imaging of the head and neck since last visit.     Labs:  CBC  Recent Labs   Lab 06/24/24  1410 10/07/24  0930   WBC 6.08 5.77   Hemoglobin 15.1 15.8   Hematocrit 45.1 47.0   MCV 84 86   Platelets 215 226     BMP  Recent Labs   Lab 10/12/23  0930 06/24/24  1410 10/07/24  0930   Glucose 90 78 83   Sodium 139 142 139   Potassium 4.1 4.8 4.7   Chloride 105 108 107   CO2 23 26 24   BUN 15 22 18   Creatinine 1.0 0.9 1.0   Calcium 9.5 9.7 9.5     COAGS        Assessment  1. S/P insertion of hypoglossal nerve stimulator    2. JOE (obstructive sleep apnea)       Plan:  Discussed plan of care with patient in detail and all questions answered. Patient reported understanding of plan of care. I gave the patient the opportunity to ask questions and patient confirmed all questions answered to satisfaction.     Counseled on reasons for decreasing level when too strong and building up muscle; discussed signs of this   Does not get chest pain with exercise and has not had issue with tongue pain and subsequent chest pain again - notify if recurs, no cardiac history but discussed if chest pain ddx heart attack vs angina etc so low threshold to go to er , preop EKG sinus mindy, no other anomolies and no cardiac symptoms otherwise but will have cards " eval if happens again  Increased pause time to 30 minutes today  Start delay is 30 minutes which he is ok with   Getting close to where he needs to be for fine tune psg but I think he needs to go up a couple of levels. No pain last night but did have pain night before. If pain again tonight in tongue then go down a level for 2 weeks then return to this level. If no pain tonight, stay on the current level for another week or two and then go up again.     F/u 6 weeks to check in re tongue motion and symptoms and see if ready for scheduling of sleep study     I spent a total of 22 minutes on the day of the visit.  This includes face to face time and non-face to face time preparing to see the patient (eg, review of tests), obtaining and/or reviewing separately obtained history, documenting clinical information in the electronic or other health record, independently interpreting results and communicating results to the patient/family/caregiver, or care coordinator.   Please be aware that this note has been generated with the assistance of MMkev voice-to-text.  Please excuse any spelling or grammatical errors.

## 2025-01-29 NOTE — PROGRESS NOTES
Mr. Martinez was seen in the ENT clinic with Dr. Levine. His incoming amplitude was at 1.2 (range 0.7 to 1.7). His tongue motion forward was good. The surgical sites are well healed. Reporting feeling tired (ESS today 6).     Inspire settings:  See above  Start delay :30 minutes  Pause time: 15 minutes  Duration: 8 hours    Changes made per patient request:  Increase pause time up to 30 minutes  The  was configured    ESS today was 6, down from 12 pre-implant    Sleep sync:  Usage: 70/71 (99%)  Night used => 4 hours 66/71 (93%)    Plan: Increase level. Need fine tune sleep study  RTC: 3/14/25

## 2025-03-14 ENCOUNTER — PATIENT MESSAGE (OUTPATIENT)
Dept: OTOLARYNGOLOGY | Facility: CLINIC | Age: 80
End: 2025-03-14

## 2025-03-14 ENCOUNTER — OFFICE VISIT (OUTPATIENT)
Dept: OTOLARYNGOLOGY | Facility: CLINIC | Age: 80
End: 2025-03-14
Payer: MEDICARE

## 2025-03-14 VITALS
WEIGHT: 225.06 LBS | DIASTOLIC BLOOD PRESSURE: 81 MMHG | HEIGHT: 74 IN | SYSTOLIC BLOOD PRESSURE: 137 MMHG | BODY MASS INDEX: 28.88 KG/M2

## 2025-03-14 DIAGNOSIS — G47.33 OSA (OBSTRUCTIVE SLEEP APNEA): Primary | ICD-10-CM

## 2025-03-14 DIAGNOSIS — Z96.82 S/P INSERTION OF HYPOGLOSSAL NERVE STIMULATOR: ICD-10-CM

## 2025-03-14 NOTE — PROGRESS NOTES
OTOLARYNGOLOGY CLINIC NOTE  Date:  03/14/2025     Chief complaint:  Chief Complaint   Patient presents with    Sleep Apnea     S/P insertion of hypoglossal nerve stimulator        History of Present Illness  Juan Neves is a 80 y.o. male  presenting today for a followup.    Not feeling tired  At times wakes him up but goes back to sleep  Usually can go back to sleep easily sometimes beck turn off if can't get back to sleep   He did not realize it was at level 9.       Past Medical History  Past Medical History:   Diagnosis Date    Allergy     Anxiety     Cataract     Hyperlipidemia     Insomnia 2012    Sleep apnea         Past Surgical History  Past Surgical History:   Procedure Laterality Date    COLONOSCOPY      COLONOSCOPY N/A 01/23/2017    Procedure: COLONOSCOPY;  Surgeon: NASEEM Iqbal MD;  Location: [facility];  Service: Endoscopy;  Laterality: N/A;    EXCISION TURBINATE, SUBMUCOUS      INSERTION, NEUROSTIMULATOR, HYPOGLOSSAL Right 10/22/2024    Procedure: INSERTION,NEUROSTIMULATOR,HYPOGLOSSAL;  Surgeon: Aditi Levine MD;  Location: [facility];  Service: ENT;  Laterality: Right;  NSPIRE KEVIN ALBERT [phone]; NEUROMONITORING RANDI WOOD [phone]  RN PREOP 10/07/2024---    NASAL RECONSTRUCTION      with bilateral  grafts    NASAL SEPTUM SURGERY      SLEEP ENDOSCOPY, DRUG-INDUCED N/A 7/3/2024    Procedure: SLEEP ENDOSCOPY,DRUG-INDUCED;  Surgeon: Aditi Levine MD;  Location: [facility] OR;  Service: ENT;  Laterality: N/A;  RN PREOP 6/24/2024        Medications  Medications Ordered Prior to Encounter[1]    Review of Systems  Review of Systems   Constitutional:  Positive for malaise/fatigue.   HENT: Negative.     Cardiovascular: Negative.    Gastrointestinal:  Positive for diarrhea.   Skin: Negative.    Neurological: Negative.    Endo/Heme/Allergies:  Bruises/bleeds easily.   Psychiatric/Behavioral: Negative.        Answers submitted by the patient for this visit:  Review of  Symptoms Questionnaire  (Submitted on 3/11/2025)  eye itching: Yes  Sleep Apnea?: Yes  Difficulty urinating?: Yes    Social History   reports that he has quit smoking. His smoking use included cigars. He has never been exposed to tobacco smoke. He has never used smokeless tobacco. He reports current alcohol use of about 8.0 standard drinks of alcohol per week. He reports that he does not use drugs.     Family History  Family History   Problem Relation Name Age of Onset    Diabetes Mother Mom     Cancer Father Dad     Diabetes Sister Sis     Cancer Sister Sis     Acne Daughter Nisha     No Known Problems Daughter      Coronary artery disease Neg Hx      Heart disease Neg Hx      Melanoma Neg Hx      Psoriasis Neg Hx      Eczema Neg Hx      Lupus Neg Hx          Physical Exam   Vitals:    03/14/25 1433   BP: 137/81    Body mass index is 28.9 kg/m².            GENERAL: no acute distress.  HEAD: normocephalic.   EYES: No scleral icterus  EARS: external ear without lesion, normal pinna shape and position.    NOSE: external nose without significant bony abnormality  ORAL CAVITY/OROPHARYNX: tongue mobile. Level 9-tongue extends about 0.25-.5 cm beyond teeth   NECK: trachea midline.   RESPIRATORY: no stridor, no stertor. Voice normal. Respirations nonlabored.  NEURO: alert, responds to questions appropriately.    PSYCH:mood appropriate      Imaging:  The patient does not have any new imaging of the head and neck since last visit.     Labs:  CBC  Recent Labs   Lab 06/24/24  1410 10/07/24  0930   WBC 6.08 5.77   Hemoglobin 15.1 15.8   Hematocrit 45.1 47.0   MCV 84 86   Platelets 215 226     BMP  Recent Labs   Lab 10/12/23  0930 06/24/24  1410 10/07/24  0930   Glucose 90 78 83   Sodium 139 142 139   Potassium 4.1 4.8 4.7   Chloride 105 108 107   CO2 23 26 24   BUN 15 22 18   Creatinine 1.0 0.9 1.0   Calcium 9.5 9.7 9.5     COAGS        Assessment  1. JOE (obstructive sleep apnea)  - Polysomnogram (CPAP will be added if patient  meets diagnostic criteria.); Future    2. S/P insertion of hypoglossal nerve stimulator  - Polysomnogram (CPAP will be added if patient meets diagnostic criteria.); Future       Plan:  Discussed plan of care with patient in detail and all questions answered. Patient reported understanding of plan of care. I gave the patient the opportunity to ask questions and patient confirmed all questions answered to satisfaction.     advised to go down instead of turning it off if feels discomfort  Doing better with symptoms , overall tolerating higher level pretty well. Discussed may need to go down to level 8 - better to not stay at level if not comfortable.   Sleeping 7 hours and feels much better rested , not tired   Ready for fine tune psg- ordered; discussed purpose of fine tune psg and potential results     F/u after fine tune psg     I spent a total of 20 minutes on the day of the visit.  This includes face to face time and non-face to face time preparing to see the patient (eg, review of tests), obtaining and/or reviewing separately obtained history, documenting clinical information in the electronic or other health record, independently interpreting results and communicating results to the patient/family/caregiver, or care coordinator.   Please be aware that this note has been generated with the assistance of MModal voice-to-text.  Please excuse any spelling or grammatical errors.             [1]   Current Outpatient Medications on File Prior to Visit   Medication Sig Dispense Refill    atorvastatin (LIPITOR) 20 MG tablet Take 1 tablet (20 mg total) by mouth once daily.      DIPRIVAN 10 mg/mL infusion       fluticasone (FLONASE) 50 mcg/actuation nasal spray 1 spray by Each Nare route once daily. 3 Bottle 3    HYDROcodone-acetaminophen (NORCO) 5-325 mg per tablet Take 1 tablet by mouth every 6 (six) hours as needed for Pain. 20 tablet 0    mirtazapine (REMERON) 15 MG tablet Take 1 tablet (15 mg total) by mouth every  evening. 90 tablet 1    Ringer's solution,lactated (LACTATED RINGERS) infusion        No current facility-administered medications on file prior to visit.

## 2025-03-21 ENCOUNTER — TELEPHONE (OUTPATIENT)
Dept: PULMONOLOGY | Facility: CLINIC | Age: 80
End: 2025-03-21
Payer: MEDICARE

## 2025-03-24 ENCOUNTER — TELEPHONE (OUTPATIENT)
Dept: SLEEP MEDICINE | Facility: HOSPITAL | Age: 80
End: 2025-03-24
Payer: MEDICARE

## 2025-03-25 ENCOUNTER — HOSPITAL ENCOUNTER (OUTPATIENT)
Dept: SLEEP MEDICINE | Facility: HOSPITAL | Age: 80
Discharge: HOME | End: 2025-03-25
Attending: OTOLARYNGOLOGY
Payer: MEDICARE

## 2025-03-25 ENCOUNTER — PATIENT MESSAGE (OUTPATIENT)
Dept: SLEEP MEDICINE | Facility: HOSPITAL | Age: 80
End: 2025-03-25
Payer: MEDICARE

## 2025-03-25 DIAGNOSIS — Z96.82 S/P INSERTION OF HYPOGLOSSAL NERVE STIMULATOR: ICD-10-CM

## 2025-03-25 DIAGNOSIS — G47.33 OSA (OBSTRUCTIVE SLEEP APNEA): ICD-10-CM

## 2025-03-25 PROCEDURE — 95811 POLYSOM 6/>YRS CPAP 4/> PARM: CPT | Mod: HCNC

## 2025-03-26 NOTE — PROGRESS NOTES
IPG Serial Number  Date of Study Pre-Implant AHI BMI   [id number] [birth year]/3/46 3/25/25 44.5 28.8     Date of Activation Usage-Hrs./Wk. Subjective Benefit Sleep Tech Sleep Lab   24 88/90 (98%) Yes R. Jace WB         Settings Amplitude (V) Range (V) Electrode  PW  (µs) Rate (Hz)   Incoming  1.4 0.7 1.7 A 90 33   Outgoing  1.4   A 90 33     Starting Amplitude Minimum Amplitude Tested Maximum Amplitude Tested   1.2V 1.2V 1.5V                                      [phone], Rev C    Notes: _The patient requested to wear his mouth guard during the study that he wears nightly at home.    An Inspire Titration was performed on [email or web address]. The entire procedure was explained, including Bio calibration procedure, patient was informed that there may be a need to enter the room during the night to fix lead or make adjustments to the equipment. Questions were answered prior to start of study. He  was given instructions on how to call out for help including how to use the nurse call unit in the bathroom. Patient was given [employer] phone number to call if patient needed tech for anything, in case tech was out of tech room.  Patient education was performed. This includes the possible use of CPAP machine and different CPAP masks. He was given the after visit summary.   The lowest oxygen saturation observed during sleep was 90%   EKG: The EKG appeared to be NSR  Pt requested to sleep with a mouth guard which he does nightly at home.  Supine REM sleep was obtained during the study.

## 2025-05-06 ENCOUNTER — PATIENT MESSAGE (OUTPATIENT)
Dept: OPTOMETRY | Facility: CLINIC | Age: 80
End: 2025-05-06
Payer: MEDICARE

## 2025-05-14 ENCOUNTER — OFFICE VISIT (OUTPATIENT)
Dept: OTOLARYNGOLOGY | Facility: CLINIC | Age: 80
End: 2025-05-14
Payer: MEDICARE

## 2025-05-14 VITALS
SYSTOLIC BLOOD PRESSURE: 118 MMHG | BODY MASS INDEX: 28.88 KG/M2 | HEIGHT: 74 IN | DIASTOLIC BLOOD PRESSURE: 78 MMHG | WEIGHT: 225.06 LBS

## 2025-05-14 DIAGNOSIS — Z96.82 S/P INSERTION OF HYPOGLOSSAL NERVE STIMULATOR: ICD-10-CM

## 2025-05-14 DIAGNOSIS — M54.2 NECK PAIN: ICD-10-CM

## 2025-05-14 DIAGNOSIS — G47.33 OSA (OBSTRUCTIVE SLEEP APNEA): Primary | ICD-10-CM

## 2025-05-14 PROCEDURE — 1159F MED LIST DOCD IN RCRD: CPT | Mod: CPTII,S$GLB,, | Performed by: OTOLARYNGOLOGY

## 2025-05-14 PROCEDURE — 3288F FALL RISK ASSESSMENT DOCD: CPT | Mod: CPTII,S$GLB,, | Performed by: OTOLARYNGOLOGY

## 2025-05-14 PROCEDURE — 99213 OFFICE O/P EST LOW 20 MIN: CPT | Mod: S$GLB,,, | Performed by: OTOLARYNGOLOGY

## 2025-05-14 PROCEDURE — 1126F AMNT PAIN NOTED NONE PRSNT: CPT | Mod: CPTII,S$GLB,, | Performed by: OTOLARYNGOLOGY

## 2025-05-14 PROCEDURE — 3078F DIAST BP <80 MM HG: CPT | Mod: CPTII,S$GLB,, | Performed by: OTOLARYNGOLOGY

## 2025-05-14 PROCEDURE — 3074F SYST BP LT 130 MM HG: CPT | Mod: CPTII,S$GLB,, | Performed by: OTOLARYNGOLOGY

## 2025-05-14 PROCEDURE — 1101F PT FALLS ASSESS-DOCD LE1/YR: CPT | Mod: CPTII,S$GLB,, | Performed by: OTOLARYNGOLOGY

## 2025-05-14 NOTE — PROGRESS NOTES
Mr. Neves was seen in the ENT clinic with Dr. Levine. This is his post FTSS visits. The incoming amplitude was 1.4 (this is also his therapeutic amplitude). His tongue protrusion was good. Reporting good subjective benefits.    Incoming Inspire settings  Lower limit: 1.2  Upper limit: 2.2  Start delay: 40  Pause time: 30  Duration: 8  The battery was good    Changes made during this visit:  Lower limit: 1.2  Upper limit: 1.6    ESS today: 5  Pre implant ESS: 12    Today we discussed his setting on his remote. If snoring or frequent trip to the bathroom returns, he can step up and let us know.

## 2025-05-14 NOTE — PROGRESS NOTES
OTOLARYNGOLOGY CLINIC NOTE  Date:  05/14/2025     Chief complaint:  Chief Complaint   Patient presents with    Sleep Apnea     inspire pt, FTSS ordered. implanted on-10/22/2024       History of Present Illness  Juan Neves is a 80 y.o. male  presenting today for a followup.beginning to sleep all the way through the night. No discomfort with epworth sleep scale 5   Has been having posterior neck pain 3 days, tried ibuprofen. No numbness in hands    S/p inspire 10-22-24  Dise 7-3-24: mild septal deviation, turbinate hypertrophy, redundant uvular tissue, no concentric collapse    Underwent fine tune sleep study 3-25-25  Ta 1.4    I last saw the patient on 3-14-25. Below text is copied from  note on that date describing history of present illness at that time :  Not feeling tired  At times wakes him up but goes back to sleep  Usually can go back to sleep easily sometimes beck turn off if can't get back to sleep   He did not realize it was at level 9.        Past Medical History  Past Medical History:   Diagnosis Date    Allergy     Anxiety     Cataract     Hyperlipidemia     Insomnia 2012    Sleep apnea         Past Surgical History  Past Surgical History:   Procedure Laterality Date    COLONOSCOPY      COLONOSCOPY N/A 01/23/2017    Procedure: COLONOSCOPY;  Surgeon: NASEEM Iqbal MD;  Location: [facility];  Service: Endoscopy;  Laterality: N/A;    EXCISION TURBINATE, SUBMUCOUS      INSERTION, NEUROSTIMULATOR, HYPOGLOSSAL Right 10/22/2024    Procedure: INSERTION,NEUROSTIMULATOR,HYPOGLOSSAL;  Surgeon: Aditi Levine MD;  Location: [facility] OR;  Service: ENT;  Laterality: Right;  MARIKA ALBERT [phone]; NEUROMONITORING RANDI WOOD [phone]  RN PREOP 10/07/2024---    NASAL RECONSTRUCTION      with bilateral  grafts    NASAL SEPTUM SURGERY      SLEEP ENDOSCOPY, DRUG-INDUCED N/A 7/3/2024    Procedure: SLEEP ENDOSCOPY,DRUG-INDUCED;  Surgeon: Aditi Levine MD;  Location: [facility] OR;   Service: ENT;  Laterality: N/A;  RN PREOP 6/24/2024        Medications  Medications Ordered Prior to Encounter[1]    Review of Systems  Review of Systems   Constitutional:  Positive for malaise/fatigue.   HENT: Negative.     Eyes: Negative.    Cardiovascular:  Positive for chest pain.   Gastrointestinal:  Positive for diarrhea.   Genitourinary: Negative.    Skin: Negative.    Neurological: Negative.    Psychiatric/Behavioral: Negative.      Answers submitted by the patient for this visit:  Review of Symptoms Questionnaire  (Submitted on 5/8/2025)  Sleep Apnea?: Yes    Social History   reports that he has quit smoking. His smoking use included cigars. He has never been exposed to tobacco smoke. He has never used smokeless tobacco. He reports current alcohol use of about 8.0 standard drinks of alcohol per week. He reports that he does not use drugs.     Family History  Family History   Problem Relation Name Age of Onset    Diabetes Mother Mom     Cancer Father Dad     Diabetes Sister Sis     Cancer Sister Sis     Acne Daughter Nisha     No Known Problems Daughter      Coronary artery disease Neg Hx      Heart disease Neg Hx      Melanoma Neg Hx      Psoriasis Neg Hx      Eczema Neg Hx      Lupus Neg Hx          Physical Exam   Vitals:    05/14/25 0956   BP: 118/78    Body mass index is 28.9 kg/m².            GENERAL: no acute distress.  HEAD: normocephalic.   EYES: No scleral icterus  EARS: external ear without lesion, normal pinna shape and position.    NOSE: external nose without significant bony abnormality  ORAL CAVITY/OROPHARYNX: tongue mobile. 1.4 volts about .3 to .5 cm beyond teeth- initially behind teeth but then protruded I think was positioning   NECK: trachea midline.   RESPIRATORY: no stridor, no stertor. Voice normal. Respirations nonlabored.  NEURO: alert, responds to questions appropriately.    PSYCH:mood appropriate      Imaging:  The patient does not have any new imaging of the head and neck since  last visit.     Labs:  CBC  Recent Labs   Lab 06/24/24  1410 10/07/24  0930   WBC 6.08 5.77   Hemoglobin 15.1 15.8   Hematocrit 45.1 47.0   MCV 84 86   Platelets 215 226     BMP  Recent Labs   Lab 10/12/23  0930 06/24/24  1410 10/07/24  0930   Glucose 90 78 83   Sodium 139 142 139   Potassium 4.1 4.8 4.7   Chloride 105 108 107   CO2 23 26 24   BUN 15 22 18   Creatinine 1.0 0.9 1.0   Calcium 9.5 9.7 9.5     COAGS        Assessment  1. JOE (obstructive sleep apnea)    2. S/P insertion of hypoglossal nerve stimulator    3. Neck pain       Plan:  Discussed plan of care with patient in detail and all questions answered. Patient reported understanding of plan of care. I gave the patient the opportunity to ask questions and patient confirmed all questions answered to satisfaction.     Rec ibuprofen prn for neck pain. Do not think related to inspire. Advised him if not improving to contact his pcp  Reviewed results of fine tune sleep study     F/u 1 year for battery check sooner if issue, counseled on things to look out for to notify me about ( for example pain or if gains/loses weight may need to adjust level)    I spent a total of 20 minutes on the day of the visit   This includes face to face time and non-face to face time preparing to see the patient (eg, review of tests), obtaining and/or reviewing separately obtained history, documenting clinical information in the electronic or other health record, independently interpreting results and communicating results to the patient/family/caregiver, or care coordinator.   Please be aware that this note has been generated with the assistance of MModal voice-to-text.  Please excuse any spelling or grammatical errors.                 [1]   Current Outpatient Medications on File Prior to Visit   Medication Sig Dispense Refill    atorvastatin (LIPITOR) 20 MG tablet Take 1 tablet (20 mg total) by mouth once daily.      DIPRIVAN 10 mg/mL infusion       fluticasone (FLONASE) 50  mcg/actuation nasal spray 1 spray by Each Nare route once daily. 3 Bottle 3    HYDROcodone-acetaminophen (NORCO) 5-325 mg per tablet Take 1 tablet by mouth every 6 (six) hours as needed for Pain. 20 tablet 0    mirtazapine (REMERON) 15 MG tablet Take 1 tablet (15 mg total) by mouth every evening. 90 tablet 1    Ringer's solution,lactated (LACTATED RINGERS) infusion        No current facility-administered medications on file prior to visit.

## 2025-07-22 ENCOUNTER — TELEPHONE (OUTPATIENT)
Dept: SLEEP MEDICINE | Facility: HOSPITAL | Age: 80
End: 2025-07-22
Payer: MEDICARE

## 2025-08-14 ENCOUNTER — TELEPHONE (OUTPATIENT)
Dept: SLEEP MEDICINE | Facility: HOSPITAL | Age: 80
End: 2025-08-14
Payer: MEDICARE

## (undated) DEVICE — SUT 4-0 CV RB-1 UND CR

## (undated) DEVICE — Device

## (undated) DEVICE — BLANKET LOWER BODY 55.9X40.2IN

## (undated) DEVICE — CANISTER SUCTION RIGID 2000CC

## (undated) DEVICE — ELECTRODE EMG NEEDLE

## (undated) DEVICE — PASSER

## (undated) DEVICE — TOWEL OR XRAY BLUE 17X26IN

## (undated) DEVICE — SPONGE KITTNER 1/4X 5/8 L STRL

## (undated) DEVICE — SPONGE COTTON TRAY 4X4IN

## (undated) DEVICE — SYR 10CC LUER LOCK

## (undated) DEVICE — KIT ANTIFOG

## (undated) DEVICE — SUT MCRYL PLUS 4-0 PS2 27IN

## (undated) DEVICE — DEV-O-LOOPS MAXI RED

## (undated) DEVICE — CATH IV INTROCAN 20G X 1.1

## (undated) DEVICE — PACK EENT SURG II ECLIPSE

## (undated) DEVICE — TAPE ADH MEDIPORE 4 X 10YDS

## (undated) DEVICE — GLOVE SIGNATURE ESSNTL LTX 6.5

## (undated) DEVICE — SHEARS HARMONIC CRVD 9 CM

## (undated) DEVICE — SUT MONOCRYL 5-0 P-3 UND 18

## (undated) DEVICE — SYS LABLNG CORECT MED 4 FLG

## (undated) DEVICE — DRAPE STERI INSTRUMENT 1018

## (undated) DEVICE — GOWN NONREINF SET-IN SLV XL

## (undated) DEVICE — SEE MEDLINE ITEM 146373

## (undated) DEVICE — SUT SILK 2-0 SH 18IN BLACK

## (undated) DEVICE — SUT VICRYL PLUS 3-0 SH 18IN

## (undated) DEVICE — DRAPE INCISE IOBAN 2 23X17IN

## (undated) DEVICE — COVER OVERHEAD SURG LT BLUE

## (undated) DEVICE — SUT MONOCRYL 4.0 PS2 CP496G

## (undated) DEVICE — GLOVE BIOGEL ECLIPSE SZ 6

## (undated) DEVICE — LOOP VESSEL BLUE MAXI

## (undated) DEVICE — STAPLER SKIN PROXIMATE WIDE

## (undated) DEVICE — SYR 12CC CNTRL L-L NO NDL

## (undated) DEVICE — DRAPE SURG W/TWL 17 5/8X23

## (undated) DEVICE — DRAPE SMARTDRAPE MICROSCOPE

## (undated) DEVICE — ADHESIVE DERMABOND MINI HV

## (undated) DEVICE — SUPPORT ULNA NERVE PROTECTOR

## (undated) DEVICE — SUT SILK 3-0 CR/RB-1 8-18

## (undated) DEVICE — DRESSING TRANS 2X2 TEGADERM

## (undated) DEVICE — POSITIONER HEAD DONUT 9IN FOAM

## (undated) DEVICE — NDL 27G X 1 1/4

## (undated) DEVICE — SOL NACL IRR 1000ML BTL

## (undated) DEVICE — GLOVE SURGICAL LATEX SZ 6.5

## (undated) DEVICE — DRAPE THREE-QUARTER 53X77IN

## (undated) DEVICE — ELECTRODE REM PLYHSV RETURN 9

## (undated) DEVICE — DRAPE INSTR MAGNETIC 10X16IN

## (undated) DEVICE — ELECTRODE BLADE INSULATED 1 IN

## (undated) DEVICE — SPONGE GAUZE 16PLY 4X4

## (undated) DEVICE — KIT PROBE COVER WITH GEL